# Patient Record
Sex: MALE | Race: WHITE | NOT HISPANIC OR LATINO | Employment: OTHER
[De-identification: names, ages, dates, MRNs, and addresses within clinical notes are randomized per-mention and may not be internally consistent; named-entity substitution may affect disease eponyms.]

---

## 2017-12-02 ENCOUNTER — HEALTH MAINTENANCE LETTER (OUTPATIENT)
Age: 62
End: 2017-12-02

## 2018-03-14 ENCOUNTER — HOSPITAL ENCOUNTER (EMERGENCY)
Facility: CLINIC | Age: 63
Discharge: HOME | End: 2018-03-14
Attending: EMERGENCY MEDICINE | Admitting: EMERGENCY MEDICINE
Payer: COMMERCIAL

## 2018-03-14 ENCOUNTER — APPOINTMENT (OUTPATIENT)
Dept: MRI IMAGING | Facility: CLINIC | Age: 63
End: 2018-03-14
Attending: EMERGENCY MEDICINE
Payer: COMMERCIAL

## 2018-03-14 VITALS
RESPIRATION RATE: 16 BRPM | OXYGEN SATURATION: 94 % | SYSTOLIC BLOOD PRESSURE: 175 MMHG | HEIGHT: 61 IN | WEIGHT: 260 LBS | TEMPERATURE: 98 F | HEART RATE: 89 BPM | BODY MASS INDEX: 49.09 KG/M2 | DIASTOLIC BLOOD PRESSURE: 88 MMHG

## 2018-03-14 DIAGNOSIS — I10 ESSENTIAL HYPERTENSION: Primary | ICD-10-CM

## 2018-03-14 DIAGNOSIS — R51.9 CHRONIC NONINTRACTABLE HEADACHE, UNSPECIFIED HEADACHE TYPE: ICD-10-CM

## 2018-03-14 DIAGNOSIS — G89.29 CHRONIC NONINTRACTABLE HEADACHE, UNSPECIFIED HEADACHE TYPE: ICD-10-CM

## 2018-03-14 LAB
ANION GAP SERPL CALCULATED.3IONS-SCNC: 5 MMOL/L (ref 3–14)
BASOPHILS # BLD AUTO: 0 10E9/L (ref 0–0.2)
BASOPHILS NFR BLD AUTO: 0.6 %
BUN SERPL-MCNC: 20 MG/DL (ref 7–30)
CALCIUM SERPL-MCNC: 8.5 MG/DL (ref 8.5–10.1)
CHLORIDE SERPL-SCNC: 110 MMOL/L (ref 94–109)
CO2 SERPL-SCNC: 28 MMOL/L (ref 20–32)
CREAT SERPL-MCNC: 1.03 MG/DL (ref 0.66–1.25)
DIFFERENTIAL METHOD BLD: NORMAL
EOSINOPHIL # BLD AUTO: 0.1 10E9/L (ref 0–0.7)
EOSINOPHIL NFR BLD AUTO: 1.5 %
ERYTHROCYTE [DISTWIDTH] IN BLOOD BY AUTOMATED COUNT: 13.5 % (ref 10–15)
GFR SERPL CREATININE-BSD FRML MDRD: 73 ML/MIN/1.7M2
GLUCOSE SERPL-MCNC: 146 MG/DL (ref 70–99)
HCT VFR BLD AUTO: 45.4 % (ref 40–53)
HGB BLD-MCNC: 15.7 G/DL (ref 13.3–17.7)
IMM GRANULOCYTES # BLD: 0 10E9/L (ref 0–0.4)
IMM GRANULOCYTES NFR BLD: 0.2 %
INTERPRETATION ECG - MUSE: NORMAL
LYMPHOCYTES # BLD AUTO: 1.8 10E9/L (ref 0.8–5.3)
LYMPHOCYTES NFR BLD AUTO: 28.9 %
MCH RBC QN AUTO: 29.9 PG (ref 26.5–33)
MCHC RBC AUTO-ENTMCNC: 34.6 G/DL (ref 31.5–36.5)
MCV RBC AUTO: 87 FL (ref 78–100)
MONOCYTES # BLD AUTO: 0.5 10E9/L (ref 0–1.3)
MONOCYTES NFR BLD AUTO: 7.3 %
NEUTROPHILS # BLD AUTO: 3.8 10E9/L (ref 1.6–8.3)
NEUTROPHILS NFR BLD AUTO: 61.5 %
NRBC # BLD AUTO: 0 10*3/UL
NRBC BLD AUTO-RTO: 0 /100
PLATELET # BLD AUTO: 172 10E9/L (ref 150–450)
POTASSIUM SERPL-SCNC: 4.2 MMOL/L (ref 3.4–5.3)
RBC # BLD AUTO: 5.25 10E12/L (ref 4.4–5.9)
SODIUM SERPL-SCNC: 143 MMOL/L (ref 133–144)
TROPONIN I SERPL-MCNC: <0.015 UG/L (ref 0–0.04)
TSH SERPL DL<=0.005 MIU/L-ACNC: 2.34 MU/L (ref 0.4–4)
WBC # BLD AUTO: 6.2 10E9/L (ref 4–11)

## 2018-03-14 PROCEDURE — 27210995 ZZH RX 272: Performed by: EMERGENCY MEDICINE

## 2018-03-14 PROCEDURE — 25000128 H RX IP 250 OP 636: Performed by: EMERGENCY MEDICINE

## 2018-03-14 PROCEDURE — A9585 GADOBUTROL INJECTION: HCPCS | Performed by: EMERGENCY MEDICINE

## 2018-03-14 PROCEDURE — 70553 MRI BRAIN STEM W/O & W/DYE: CPT

## 2018-03-14 PROCEDURE — 25000132 ZZH RX MED GY IP 250 OP 250 PS 637: Performed by: EMERGENCY MEDICINE

## 2018-03-14 PROCEDURE — 80048 BASIC METABOLIC PNL TOTAL CA: CPT | Performed by: EMERGENCY MEDICINE

## 2018-03-14 PROCEDURE — 93005 ELECTROCARDIOGRAM TRACING: CPT

## 2018-03-14 PROCEDURE — 84443 ASSAY THYROID STIM HORMONE: CPT | Performed by: EMERGENCY MEDICINE

## 2018-03-14 PROCEDURE — 99285 EMERGENCY DEPT VISIT HI MDM: CPT | Mod: 25

## 2018-03-14 PROCEDURE — 96361 HYDRATE IV INFUSION ADD-ON: CPT

## 2018-03-14 PROCEDURE — 96360 HYDRATION IV INFUSION INIT: CPT | Mod: 59

## 2018-03-14 PROCEDURE — 85025 COMPLETE CBC W/AUTO DIFF WBC: CPT | Performed by: EMERGENCY MEDICINE

## 2018-03-14 PROCEDURE — 84484 ASSAY OF TROPONIN QUANT: CPT | Performed by: EMERGENCY MEDICINE

## 2018-03-14 RX ORDER — PROCHLORPERAZINE MALEATE 10 MG
10 TABLET ORAL EVERY 8 HOURS PRN
Qty: 5 TABLET | Refills: 0 | Status: SHIPPED | OUTPATIENT
Start: 2018-03-14 | End: 2018-07-12

## 2018-03-14 RX ORDER — ACYCLOVIR 200 MG/1
60 CAPSULE ORAL ONCE
Status: COMPLETED | OUTPATIENT
Start: 2018-03-14 | End: 2018-03-14

## 2018-03-14 RX ORDER — GADOBUTROL 604.72 MG/ML
11 INJECTION INTRAVENOUS ONCE
Status: COMPLETED | OUTPATIENT
Start: 2018-03-14 | End: 2018-03-14

## 2018-03-14 RX ORDER — AMLODIPINE BESYLATE 5 MG/1
5 TABLET ORAL DAILY
Qty: 30 TABLET | Refills: 0 | Status: SHIPPED | OUTPATIENT
Start: 2018-03-14 | End: 2018-03-29

## 2018-03-14 RX ORDER — CLONIDINE HYDROCHLORIDE 0.1 MG/1
0.2 TABLET ORAL ONCE
Status: COMPLETED | OUTPATIENT
Start: 2018-03-14 | End: 2018-03-14

## 2018-03-14 RX ADMIN — SODIUM CHLORIDE 60 ML: 9 INJECTION, SOLUTION INTRAMUSCULAR; INTRAVENOUS; SUBCUTANEOUS at 09:43

## 2018-03-14 RX ADMIN — CLONIDINE HYDROCHLORIDE 0.2 MG: 0.1 TABLET ORAL at 08:02

## 2018-03-14 RX ADMIN — GADOBUTROL 11 ML: 604.72 INJECTION INTRAVENOUS at 09:43

## 2018-03-14 RX ADMIN — SODIUM CHLORIDE 1000 ML: 9 INJECTION, SOLUTION INTRAVENOUS at 07:57

## 2018-03-14 ASSESSMENT — ENCOUNTER SYMPTOMS
FEVER: 0
SHORTNESS OF BREATH: 0
HEADACHES: 1
NUMBNESS: 1

## 2018-03-14 NOTE — ED PROVIDER NOTES
"  History     Chief Complaint: headache      HPI   Brian Flynn is a 62 year old male who presents with a dull, intermittent back-sided headache present for about a week and a half. This has been in the context of increasing blood pressure. He notes associated neck ache and intermittent issues with balance which are new for him and present more commonly in the morning. In addition, he notes numbness in bilateral toes. He is not diabetic. He notes he does have a history of diagnosed hypertension and he does not take medications for this, although his \"blood pressure has been high for awhile\". He reports he has not seen a doctor recently. He also notes he had the flu a few months ago. Patient denies vision changes, chest pain, shortness of breath and fever. He has not fallen recently.     Cardiac Risk Factors   Sex: Male   Tobacco: Negative   Hypertension: Positive  Diabetes: Negative  Hyperlipidemia: Negative  Family History: Negative    Allergies:  Fluticasone      Medications:    The patient is not currently taking any prescribed medications.     Past Medical History:    Benign HTN  Blind right eye   Obesity   Dermatitis legs    Past Surgical History:    Circumcision     Family History:    HTN  Cerebrovascular disease  Cancer  Asthma    Social History:  Marital Status:   Presents to the ED alone.   Tobacco Use: Never  Alcohol Use: Yes  PCP: Kev Bustillo     Review of Systems   Constitutional: Negative for fever.   Eyes: Negative for visual disturbance.   Respiratory: Negative for shortness of breath.    Cardiovascular: Negative for chest pain.   Neurological: Positive for numbness and headaches.        Positive for ataxia.    All other systems reviewed and are negative.    Physical Exam   First Vitals:  BP: (!) 229/127  Pulse: 89  Temp: 98  F (36.7  C)  Resp: 16  Height: 155.4 cm (5' 1.2\")  Weight: 117.9 kg (260 lb)  SpO2: 99 %    Physical Exam  General: Alert, appears well-developed and " well-nourished. Cooperative.     In mild distress, diaphoretic.   HEENT:  Head:  Atraumatic  Ears:  External ears are normal  Mouth/Throat:  Oropharynx is without erythema or exudate and mucous membranes are moist.    Eyes:   Conjunctivae normal and EOM are normal. No scleral icterus.    Pupils are equal, round, and reactive to light.   Neck:   Normal range of motion. Neck supple.  CV:  Normal rate, regular rhythm, normal heart sounds and radial pulses are 2+ and symmetric.  No murmur.  Resp:  Breath sounds are clear bilaterally    Non-labored, no retractions or accessory muscle use  GI:  Obese. Abdomen is soft, no distension, no tenderness. No rebound or guarding.  No CVA tenderness bilaterally  MS:  Normal range of motion. No edema.    Normal strength in all 4 extremities.     Back atraumatic.    No midline cervical, thoracic, or lumbar tenderness  Skin:  Warm and dry.  No rash or lesions noted.  Neuro:  Alert. Normal strength.  Sensation intact in all 4 extremities. GCS: 15    Cranial nerves 2-12 intact.    Rapid alternating movement intact bilaterally.     Finger to nose coordination intact bilaterally.     Gait normal.       Negative Romberg   Psych:  Normal mood and affect.    Emergency Department Course   ECG:  @ 0802  Indication: Ataxia  Vent. Rate 83 bpm. WI interval 122 ms. QRS duration 96 ms. QT/QTc 380/446 ms. P-R-T axis 51 17 137.   Normal sinus rhythm. T wave abnormality, consider lateral ischemia. Abnormal ECG.    Read @ 0810 by Dr. Busby.    Imaging:  MR brain, with and without contrast, per radiology:   IMPRESSION:      1. Nothing acute.  2. Mild nonspecific chronic white matter disease.     Radiographic findings were communicated with the patient who voiced understanding of the findings.    Laboratory:  CBC:  WBC 6.2, HGB 15.7, , otherwise WNL  BMP: Chloride 110 (H), glucose 146 (H), otherwise WNL (Creatinine 1.03)  Troponin: <0.015  TSH with free T4 reflex: 2.34    Interventions:  0757:  Normal Saline, 1 liter, IV bolus   0802: Catapres, 0.2 mg, oral  0943: Gadavist, 11 mL, IV injection    Emergency Department Course:  Nursing notes and vitals reviewed.  0749: I performed an exam of the patient as documented above.  The above workup was undertaken.  1010: I rechecked the patient and discussed results.  Findings and plan explained to the Patient. Patient discharged home, status improved, with instructions regarding supportive care, medications, and reasons to return as well as the importance of close follow-up was reviewed. Patient was prescribed Norvasc and Compazine.     Impression & Plan      Medical Decision Making:  Patient is a 62-year-old male who presents with one week of persistent headache as well as concern for ataxia. Patient has elevated blood pressure concerning with a systolic reading greater than 220 on initial arrival to the emergency department. He has no associated symptoms such as chest pain, shortness of breath or vision changes. 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 Patient presented with concern for hypertensive emergency with his history of gait instability and ataxia.  Patient's gait does appear normal here and his neuro exam seems grossly normal.  He does have a persistent headache that he describes started in the back of his head near the connection between the base of the occiput and his cervical spine. Patient did have a MRI which shows no evidence of intracranial hemorrhage or posterior circulation stroke. His EKG does have some nonspecific T-wave inversions in the lateral leads. It is unclear if these are old or new but patient has a negative troponin here and no active chest pain symptoms.  Negative troponin today, lower concern for ACS.  There is no indication for inpatient admission at this time. Patient was given clonidine here for his elevated blood pressure. This helped to resolve his blood pressure in the  emergent setting. His repeat blood pressure check prior to discharge was in the 170s systolic.     Patient did not have full resolution of his headache here. He had declined headache medications in the emergency department and preferred rather to go home with oral medications. We did give him a couple tabs of oral Compazine for use at home. We also will start him on a low-dose of amlodipine at 5 mg. Plan is for him to follow up with primary care in the next one week for recheck and the potential of increasing his amlodipine to 10 mg in one week if his primary care provider feels this is necessary.  Patient has not seen a physician in over eight years and should have a recheck of fasting cholesterol as well as fasting glucose and other annual checkup labs.  No indication for inpatient admission at this time.  Patient has no other concerning signs of stroke at this time.  Patient certainly should return and encouraged to return to emergency department if he develops worsening symptoms, new-onset slurred speech or vision changes or other concerning findings for stroke, develops chest pain or shortness of breath, or has additional questions regarding his elevated blood pressure.  Sent home with education regarding hypertension treatment and headache treatment.  Close return precautions and discharge instructions understood prior to discharge.  Discharged home.    Diagnosis:    ICD-10-CM    1. Essential hypertension I10    2. Chronic nonintractable headache, unspecified headache type R51        Disposition:  Discharged to home.     Discharge Medications:  Discharge Medication List as of 3/14/2018 10:17 AM      START taking these medications    Details   amLODIPine (NORVASC) 5 MG tablet Take 1 tablet (5 mg) by mouth daily, Disp-30 tablet, R-0, Local Print      prochlorperazine (COMPAZINE) 10 MG tablet Take 1 tablet (10 mg) by mouth every 8 hours as needed for nausea or other (headache not responsive to tylenol alone),  Disp-5 tablet, R-0, Local Print               I, Jennifer Walker, am serving as a scribe on 3/14/2018 at 7:49 AM to personally document services performed by Dr. Busby based on my observations and the provider's statements to me.    EMERGENCY DEPARTMENT       Gerald Busby MD  03/14/18 [id number]

## 2018-03-14 NOTE — ED AVS SNAPSHOT
Emergency Department    [address] [place] [id number]    Phone:  [phone]    Fax:  [phone]                                       Brian Flynn   MRN: [id number]    Department:   Emergency Department   Date of Visit:  3/14/2018           After Visit Summary Signature Page     I have received my discharge instructions, and my questions have been answered. I have discussed any challenges I see with this plan with the nurse or doctor.    ..........................................................................................................................................  Patient/Patient Representative Signature      ..........................................................................................................................................  Patient Representative Print Name and Relationship to Patient    ..................................................               ................................................  Date                                            Time    ..........................................................................................................................................  Reviewed by Signature/Title    ...................................................              ..............................................  Date                                                            Time

## 2018-03-14 NOTE — DISCHARGE INSTRUCTIONS
Discharge Instructions  Hypertension - High Blood Pressure    During you visit to the Emergency Department, your blood pressure was higher than the recommended blood pressure.  This may be related to stress, pain, medication or other temporary conditions. In these cases, your blood pressure may return to normal on its own. If you have a history of high blood pressure, you may need to have your provider adjust your medications. Sometimes, your high measurement here may indicate that you have developed high blood pressure that will stay high unless it is treated. As a general rule, high blood pressure causes problems over years rather than days, weeks, or months. So, while it is important to treat blood pressure, it is rarely important to treat blood pressure immediately. Occasionally we will begin a medication in the Emergency Department; more often we will recommend close follow-up for medications with a primary doctor/clinic.    Generally, every Emergency Department visit should have a follow-up clinic visit with either a primary or a specialty clinic/provider. Please follow-up as instructed by your emergency provider today.    Return to the Emergency Department if you start to have:    A severe headache.    Chest pain.    Shortness of breath.    Weakness or numbness that affects one part of the body.    Confusion.    Vision changes.    Significant swelling of legs and/or eyes.    A reaction to any medication started in the Emergency Department.    What can I do to help myself?    Avoid alcohol.    Take any blood pressure medicine that you are prescribed.    Get a good night s sleep.    Lower your salt intake.    Exercise.    Lose weight.    Manage stress.    See your doctor regularly    If blood pressure medication was started in the Emergency Department:    The medicine may not have an immediate effect. The body and brain determine what blood pressure you have. The medicine s job is to retrain the body s   thermostat  to a lower blood pressure.    You will need to follow up with your provider to see how this medicine is working for you.  If you were given a prescription for medicine here today, be sure to read all of the information (including the package insert) that comes with your prescription.  This will include important information about the medicine, its side effects, and any warnings that you need to know about.  The pharmacist who fills the prescription can provide more information and answer questions you may have about the medicine.  If you have questions or concerns that the pharmacist cannot address, please call or return to the Emergency Department.   Remember that you can always come back to the Emergency Department if you are not able to see your regular provider in the amount of time listed above, if you get any new symptoms, or if there is anything that worries you.    Discharge Instructions  Headache    You were seen today for a headache. Headaches may be caused by many different things such as muscle tension, sinus inflammation, anxiety and stress, having too little sleep, too much alcohol, some medical conditions or injury. You may have a migraine, which is caused by changes in the blood vessels in your head.  At this time your provider does not find that your headache is a sign of anything dangerous or life-threatening.  However, sometimes the signs of serious illness do not show up right away.      Generally, every Emergency Department visit should have a follow-up clinic visit with either a primary or a specialty clinic/provider. Please follow-up as instructed by your emergency provider today.    Return to the Emergency Department if:    You get a new fever of 100.4 F or higher.    Your headache gets much worse.    You get a stiff neck with your headache.    You get a new headache that is significantly different or worse than headaches you have had before.    You are vomiting (throwing up) and  cannot keep food or water down.    You have blurry or double vision or other problems with your eyes.    You have a new weakness on one side of your body.    You have difficulty with balance which is new.    You or your family thinks you are confused.    You have a seizure.    What can I do to help myself?    Pain medications - You may take a pain medication such as Tylenol  (acetaminophen), Advil , Motrin  (ibuprofen) or Aleve  (naproxen).    Take a pain reliever as soon as you notice symptoms.  Starting medications as soon as you start to have symptoms may lessen the amount of pain you have.    Relaxing in a quiet, dark room may help.    Get enough sleep and eat meals regularly.    You may need to watch for certain foods or other things which may trigger your headaches.  Keeping a journal of your headaches and possible triggers may help you and your primary provider to identify things which you should avoid which may be causing your headaches.  If you were given a prescription for medicine here today, be sure to read all of the information (including the package insert) that comes with your prescription.  This will include important information about the medicine, its side effects, and any warnings that you need to know about.  The pharmacist who fills the prescription can provide more information and answer questions you may have about the medicine.  If you have questions or concerns that the pharmacist cannot address, please call or return to the Emergency Department.   Remember that you can always come back to the Emergency Department if you are not able to see your regular provider in the amount of time listed above, if you get any new symptoms, or if there is anything that worries you.

## 2018-03-14 NOTE — ED AVS SNAPSHOT
Emergency Department    [address] [facility] [address]    Phone:  [phone]    Fax:  [phone]                                       Brian Flynn   MRN: [id number]    Department:   Emergency Department   Date of Visit:  3/14/2018           Patient Information     Date Of Birth          1955        Your diagnoses for this visit were:     Essential hypertension     Chronic nonintractable headache, unspecified headache type        You were seen by Gerald Busby MD.      Follow-up Information     Schedule an appointment as soon as possible for a visit with Kev Bustillo MD.    Specialty:  Family Practice    Contact information:    [address]  [facility] [address] [phone]          Follow up with  Emergency Department.    Specialty:  EMERGENCY MEDICINE    Why:  If symptoms worsen    Contact information:    [address] [facility] [id number] [phone]        Discharge Instructions       Discharge Instructions  Hypertension - High Blood Pressure    During you visit to the Emergency Department, your blood pressure was higher than the recommended blood pressure.  This may be related to stress, pain, medication or other temporary conditions. In these cases, your blood pressure may return to normal on its own. If you have a history of high blood pressure, you may need to have your provider adjust your medications. Sometimes, your high measurement here may indicate that you have developed high blood pressure that will stay high unless it is treated. As a general rule, high blood pressure causes problems over years rather than days, weeks, or months. So, while it is important to treat blood pressure, it is rarely important to treat blood pressure immediately. Occasionally we will begin a medication in the Emergency Department; more often we will recommend close follow-up for medications with a primary  doctor/clinic.    Generally, every Emergency Department visit should have a follow-up clinic visit with either a primary or a specialty clinic/provider. Please follow-up as instructed by your emergency provider today.    Return to the Emergency Department if you start to have:    A severe headache.    Chest pain.    Shortness of breath.    Weakness or numbness that affects one part of the body.    Confusion.    Vision changes.    Significant swelling of legs and/or eyes.    A reaction to any medication started in the Emergency Department.    What can I do to help myself?    Avoid alcohol.    Take any blood pressure medicine that you are prescribed.    Get a good night s sleep.    Lower your salt intake.    Exercise.    Lose weight.    Manage stress.    See your doctor regularly    If blood pressure medication was started in the Emergency Department:    The medicine may not have an immediate effect. The body and brain determine what blood pressure you have. The medicine s job is to retrain the body s  thermostat  to a lower blood pressure.    You will need to follow up with your provider to see how this medicine is working for you.  If you were given a prescription for medicine here today, be sure to read all of the information (including the package insert) that comes with your prescription.  This will include important information about the medicine, its side effects, and any warnings that you need to know about.  The pharmacist who fills the prescription can provide more information and answer questions you may have about the medicine.  If you have questions or concerns that the pharmacist cannot address, please call or return to the Emergency Department.   Remember that you can always come back to the Emergency Department if you are not able to see your regular provider in the amount of time listed above, if you get any new symptoms, or if there is anything that worries you.    Discharge  Instructions  Headache    You were seen today for a headache. Headaches may be caused by many different things such as muscle tension, sinus inflammation, anxiety and stress, having too little sleep, too much alcohol, some medical conditions or injury. You may have a migraine, which is caused by changes in the blood vessels in your head.  At this time your provider does not find that your headache is a sign of anything dangerous or life-threatening.  However, sometimes the signs of serious illness do not show up right away.      Generally, every Emergency Department visit should have a follow-up clinic visit with either a primary or a specialty clinic/provider. Please follow-up as instructed by your emergency provider today.    Return to the Emergency Department if:    You get a new fever of 100.4 F or higher.    Your headache gets much worse.    You get a stiff neck with your headache.    You get a new headache that is significantly different or worse than headaches you have had before.    You are vomiting (throwing up) and cannot keep food or water down.    You have blurry or double vision or other problems with your eyes.    You have a new weakness on one side of your body.    You have difficulty with balance which is new.    You or your family thinks you are confused.    You have a seizure.    What can I do to help myself?    Pain medications - You may take a pain medication such as Tylenol  (acetaminophen), Advil , Motrin  (ibuprofen) or Aleve  (naproxen).    Take a pain reliever as soon as you notice symptoms.  Starting medications as soon as you start to have symptoms may lessen the amount of pain you have.    Relaxing in a quiet, dark room may help.    Get enough sleep and eat meals regularly.    You may need to watch for certain foods or other things which may trigger your headaches.  Keeping a journal of your headaches and possible triggers may help you and your primary provider to identify things which  you should avoid which may be causing your headaches.  If you were given a prescription for medicine here today, be sure to read all of the information (including the package insert) that comes with your prescription.  This will include important information about the medicine, its side effects, and any warnings that you need to know about.  The pharmacist who fills the prescription can provide more information and answer questions you may have about the medicine.  If you have questions or concerns that the pharmacist cannot address, please call or return to the Emergency Department.   Remember that you can always come back to the Emergency Department if you are not able to see your regular provider in the amount of time listed above, if you get any new symptoms, or if there is anything that worries you.      24 Hour Appointment Hotline       To make an appointment at any [facility], call [phone] ([phone]). If you don't have a family doctor or clinic, we will help you find one. [facility] clinics are conveniently located to serve the needs of you and your family.             Review of your medicines      START taking        Dose / Directions Last dose taken    amLODIPine 5 MG tablet   Commonly known as:  NORVASC   Dose:  5 mg   Quantity:  30 tablet        Take 1 tablet (5 mg) by mouth daily   Refills:  0        prochlorperazine 10 MG tablet   Commonly known as:  COMPAZINE   Dose:  10 mg   Quantity:  5 tablet        Take 1 tablet (10 mg) by mouth every 8 hours as needed for nausea or other (headache not responsive to tylenol alone)   Refills:  0                Prescriptions were sent or printed at these locations (2 Prescriptions)                   Other Prescriptions                Printed at Department/Unit printer (2 of 2)         amLODIPine (NORVASC) 5 MG tablet               prochlorperazine (COMPAZINE) 10 MG tablet                Procedures and tests performed during your visit     Basic  metabolic panel    CBC with platelets differential    EKG 12-lead, tracing only    MR Brain w/o & w Contrast    TSH with free T4 reflex    Troponin I      Orders Needing Specimen Collection     None      Pending Results     Date and Time Order Name Status Description    3/14/2018 0754 EKG 12-lead, tracing only Preliminary             Pending Culture Results     No orders found from 3/12/2018 to 3/15/2018.            Pending Results Instructions     If you had any lab results that were not finalized at the time of your Discharge, you can call the ED Lab Result RN at [phone]. You will be contacted by this team for any positive Lab results or changes in treatment. The nurses are available 7 days a week from 10A to 6:30P.  You can leave a message 24 hours per day and they will return your call.        Test Results From Your Hospital Stay        3/14/2018  8:02 AM      Component Results     Component Value Ref Range & Units Status    WBC 6.2 4.0 - 11.0 10e9/L Final    RBC Count 5.25 4.4 - 5.9 10e12/L Final    Hemoglobin 15.7 13.3 - 17.7 g/dL Final    Hematocrit 45.4 40.0 - 53.0 % Final    MCV 87 78 - 100 fl Final    MCH 29.9 26.5 - 33.0 pg Final    MCHC 34.6 31.5 - 36.5 g/dL Final    RDW 13.5 10.0 - 15.0 % Final    Platelet Count 172 150 - 450 10e9/L Final    Diff Method Automated Method  Final    % Neutrophils 61.5 % Final    % Lymphocytes 28.9 % Final    % Monocytes 7.3 % Final    % Eosinophils 1.5 % Final    % Basophils 0.6 % Final    % Immature Granulocytes 0.2 % Final    Nucleated RBCs 0 0 /100 Final    Absolute Neutrophil 3.8 1.6 - 8.3 10e9/L Final    Absolute Lymphocytes 1.8 0.8 - 5.3 10e9/L Final    Absolute Monocytes 0.5 0.0 - 1.3 10e9/L Final    Absolute Eosinophils 0.1 0.0 - 0.7 10e9/L Final    Absolute Basophils 0.0 0.0 - 0.2 10e9/L Final    Abs Immature Granulocytes 0.0 0 - 0.4 10e9/L Final    Absolute Nucleated RBC 0.0  Final         3/14/2018  8:24 AM      Component Results     Component Value Ref  Range & Units Status    Troponin I ES <0.015 0.000 - 0.045 ug/L Final    The 99th percentile for upper reference range is 0.045 ug/L.  Troponin values   in the range of 0.045 - 0.120 ug/L may be associated with risks of adverse   clinical events.           3/14/2018  8:19 AM      Component Results     Component Value Ref Range & Units Status    Sodium 143 133 - 144 mmol/L Final    Potassium 4.2 3.4 - 5.3 mmol/L Final    Chloride 110 (H) 94 - 109 mmol/L Final    Carbon Dioxide 28 20 - 32 mmol/L Final    Anion Gap 5 3 - 14 mmol/L Final    Glucose 146 (H) 70 - 99 mg/dL Final    Urea Nitrogen 20 7 - 30 mg/dL Final    Creatinine 1.03 0.66 - 1.25 mg/dL Final    GFR Estimate 73 >60 mL/min/1.7m2 Final    Non  GFR Calc    GFR Estimate If Black 88 >60 mL/min/1.7m2 Final    African American GFR Calc    Calcium 8.5 8.5 - 10.1 mg/dL Final         3/14/2018  8:29 AM      Component Results     Component Value Ref Range & Units Status    TSH 2.34 0.40 - 4.00 mU/L Final         3/14/2018  9:51 AM      Narrative     MRI BRAIN WITHOUT AND WITH CONTRAST  3/14/2018 9:43 AM    HISTORY:  Headache, HTN urgency and ataxia.      TECHNIQUE:  Multiplanar, multisequence MRI of the brain without and  with 11 mL Gadavist.    COMPARISON: None.    FINDINGS:  There are a few scattered foci of T2 hyperintensity in the  white matter of each cerebral hemisphere consistent with chronic small  vessel ischemic disease.  There is no evidence of hemorrhage, mass,  acute infarct, or anomaly.  There are no gadolinium enhancing lesions.       The facial structures appear normal. The arteries at the base of the  brain and the dural venous sinuses appear patent.         Impression     IMPRESSION:      1. Nothing acute.  2. Mild nonspecific chronic white matter disease.     GREGOR HUTCHINS MD                Clinical Quality Measure: Blood Pressure Screening     Your blood pressure was checked while you were in the emergency department today. The  "last reading we obtained was  BP: 175/88 . Please read the guidelines below about what these numbers mean and what you should do about them.  If your systolic blood pressure (the top number) is less than 120 and your diastolic blood pressure (the bottom number) is less than 80, then your blood pressure is normal. There is nothing more that you need to do about it.  If your systolic blood pressure (the top number) is 120-139 or your diastolic blood pressure (the bottom number) is 80-89, your blood pressure may be higher than it should be. You should have your blood pressure rechecked within a year by a primary care provider.  If your systolic blood pressure (the top number) is 140 or greater or your diastolic blood pressure (the bottom number) is 90 or greater, you may have high blood pressure. High blood pressure is treatable, but if left untreated over time it can put you at risk for heart attack, stroke, or kidney failure. You should have your blood pressure rechecked by a primary care provider within the next 4 weeks.  If your provider in the emergency department today gave you specific instructions to follow-up with your doctor or provider even sooner than that, you should follow that instruction and not wait for up to 4 weeks for your follow-up visit.        Thank you for choosing [facility]       Thank you for choosing [facility] for your care. Our goal is always to provide you with excellent care. Hearing back from our patients is one way we can continue to improve our services. Please take a few minutes to complete the written survey that you may receive in the mail after you visit with us. Thank you!        [employer] Information     [employer] lets you send messages to your doctor, view your test results, renew your prescriptions, schedule appointments and more. To sign up, go to [email or web address] . Click on \"Log in\" on the left side of the screen, which will take you to the Welcome page. Then click on \"Sign " "up Now\" on the right side of the page.     You will be asked to enter the access code listed below, as well as some personal information. Please follow the directions to create your username and password.     Your access code is: [email or web address]  Expires: 2018 10:17 AM     Your access code will  in 90 days. If you need help or a new code, please call your [place] clinic or [phone].        Care EveryWhere ID     This is your Care EveryWhere ID. This could be used by other organizations to access your [place] medical records  [id number]        Equal Access to Services     [facility] : Hadpolly Garrett, chester monroe, david navas, amie stubbs . So [facility] [phone].    ATENCIÓN: Si habla español, tiene a jauregui disposición servicios gratuitos de asistencia lingüística. Llame al [phone].    We comply with applicable federal civil rights laws and [place] laws. We do not discriminate on the basis of race, color, national origin, age, disability, sex, sexual orientation, or gender identity.            After Visit Summary       This is your record. Keep this with you and show to your community pharmacist(s) and doctor(s) at your next visit.                  "

## 2018-03-21 ENCOUNTER — OFFICE VISIT (OUTPATIENT)
Dept: FAMILY MEDICINE | Facility: CLINIC | Age: 63
End: 2018-03-21
Payer: COMMERCIAL

## 2018-03-21 VITALS
DIASTOLIC BLOOD PRESSURE: 96 MMHG | SYSTOLIC BLOOD PRESSURE: 184 MMHG | WEIGHT: 273 LBS | HEART RATE: 86 BPM | TEMPERATURE: 98.2 F | OXYGEN SATURATION: 96 % | BODY MASS INDEX: 51.25 KG/M2

## 2018-03-21 DIAGNOSIS — I16.0 ASYMPTOMATIC HYPERTENSIVE URGENCY: ICD-10-CM

## 2018-03-21 DIAGNOSIS — I10 ESSENTIAL HYPERTENSION: Primary | ICD-10-CM

## 2018-03-21 DIAGNOSIS — Z13.6 CARDIOVASCULAR SCREENING; LDL GOAL LESS THAN 130: ICD-10-CM

## 2018-03-21 DIAGNOSIS — E66.01 MORBID OBESITY (H): ICD-10-CM

## 2018-03-21 LAB
CREAT UR-MCNC: 93 MG/DL
MICROALBUMIN UR-MCNC: 11 MG/L
MICROALBUMIN/CREAT UR: 11.79 MG/G CR (ref 0–17)

## 2018-03-21 PROCEDURE — 99214 OFFICE O/P EST MOD 30 MIN: CPT | Performed by: FAMILY MEDICINE

## 2018-03-21 PROCEDURE — 82043 UR ALBUMIN QUANTITATIVE: CPT | Performed by: FAMILY MEDICINE

## 2018-03-21 RX ORDER — HYDROCHLOROTHIAZIDE 25 MG/1
25 TABLET ORAL DAILY
Qty: 30 TABLET | Refills: 1 | Status: SHIPPED | OUTPATIENT
Start: 2018-03-21 | End: 2018-04-12

## 2018-03-21 NOTE — PROGRESS NOTES
SUBJECTIVE:   Brian Flynn is a 62 year old male who presents to clinic today for the following health issues:      ED/UC Followup:    Facility:  [facility]  Date of visit: 3/14/18  Reason for visit: headache - elevated BP   Current Status: bps still slightly elevated - still c/o headache and sore neck     Patient came today for follow-up on his blood pressure.  He was in the ER almost 1 week ago and his blood pressure was very elevated.  He was evaluated thoroughly including MRI of the neck EKG and blood tests which were essentially noncontributory.  Currently he denies any chest pain shortness of breath no weakness in arms or legs.  He does complain of some neck discomfort which is on and off.  He has checked his blood pressure at home on the wrist monitor which shows 150s-160s systolic over 90s diastolic readings.  Patient does have some degree of anxiety coming to a doctor and he is contributing that to his blood pressure.  He denies any other symptoms at this time no urinary symptoms, no back pain.  He was started on  Amlodipine 5 mg.  He denies any side effects of amlodipine.  Overall he is feeling improved other than his blood pressure is still elevated and complains of some neck pain on and off.        Problem list and histories reviewed & adjusted, as indicated.  Additional history: as documented    Patient Active Problem List   Diagnosis     Obesity     Essential hypertension, benign     Redundant prepuce and phimosis     DERMATITIS LEGS     Borderline high cholesterol     CARDIOVASCULAR SCREENING; LDL GOAL LESS THAN 130     Past Surgical History:   Procedure Laterality Date     CIRCUMCISION,CLAMP  11/07    for Severe phimosis with balanoposthitis       Social History   Substance Use Topics     Smoking status: Never Smoker     Smokeless tobacco: Never Used     Alcohol use No     Family History   Problem Relation Age of Onset     DIABETES No family hx of      Hypertension Father      CEREBROVASCULAR  DISEASE Father      age 69     Breast Cancer No family hx of      Cancer - colorectal No family hx of      Prostate Cancer No family hx of      CANCER Mother      ovarian     Asthma Father          Current Outpatient Prescriptions   Medication Sig Dispense Refill     hydrochlorothiazide (HYDRODIURIL) 25 MG tablet Take 1 tablet (25 mg) by mouth daily 30 tablet 1     amLODIPine (NORVASC) 5 MG tablet Take 1 tablet (5 mg) by mouth daily 30 tablet 0     prochlorperazine (COMPAZINE) 10 MG tablet Take 1 tablet (10 mg) by mouth every 8 hours as needed for nausea or other (headache not responsive to tylenol alone) 5 tablet 0       Reviewed and updated as needed this visit by clinical staff  Tobacco  Allergies  Meds  Soc Hx      Reviewed and updated as needed this visit by Provider         ROS:  CONSTITUTIONAL: NEGATIVE for fever, chills, change in weight  ENT/MOUTH: NEGATIVE for ear, mouth and throat problems  RESP: NEGATIVE for significant cough or SOB  CV: NEGATIVE for chest pain, palpitations or peripheral edema    OBJECTIVE:                                                    BP (!) 184/96  Pulse 86  Temp 98.2  F (36.8  C) (Tympanic)  Wt 273 lb (123.8 kg)  SpO2 96%  BMI 51.25 kg/m2  Body mass index is 51.25 kg/(m^2).   GENERAL: healthy, alert, well nourished, well hydrated, no distress  HENT: ear canals- normal; TMs- normal; Nose- normal; Mouth- no ulcers, no lesions  NECK, no adenopathy, no asymmetry, no masses, no stiffness; thyroid- normal to palpation, neck muscle tenderness.  RESP: lungs clear to auscultation - no rales, no rhonchi, no wheezes  CV: regular rates and rhythm, normal S1 S2, no S3 or S4 and no murmur, no click or rub -  ABDOMEN: soft, no tenderness, no  hepatosplenomegaly, no masses, normal bowel sounds         ASSESSMENT/PLAN:                                                        ICD-10-CM    1. Essential hypertension I10 hydrochlorothiazide (HYDRODIURIL) 25 MG tablet     Albumin Random Urine  Quantitative with Creat Ratio   2. Morbid obesity (H) E66.01    3. CARDIOVASCULAR SCREENING; LDL GOAL LESS THAN 130 Z13.6    4. Asymptomatic hypertensive urgency I16.0 hydrochlorothiazide (HYDRODIURIL) 25 MG tablet     Albumin Random Urine Quantitative with Creat Ratio     Patient came today for follow-up on his elevated blood pressure.  He was seen in the ER 1 week ago with extensive workup done including MRI EKG and blood test.  He was started on amlodipine.  He is taking that medication his blood pressure at home has been in the 160s systolic range.  Overall he feels better stable but he continued to have some neck discomfort on and off.  He denies any chest pain, shortness of breath.  Patient has hypertensive urgency but without any symptoms at this time.  I suggested we should add hydrochlorothiazide to already taking amlodipine.  He is advised to check his blood pressure once or twice a day for the next 1 week.  If the numbers are stable he will follow-up with me in 1 week otherwise if any change in symptoms or any high blood pressure he needs to be seen urgently in the ER setting.  We did discuss about lifestyle changes including low-salt diet and doing some exercise.    I also discussed with the patient regarding sleep apnea given his body habitus.  Patient does snore at night.  Once we optimize the dosing of his blood pressure, if  continue to be elevated we will try to get him a sleep study to see if there is any sleep apnea involved.    Warning signs were discussed with the patient in detail.  He understands that and follow-up immediately if there is any change in symptoms.  She will start taking hydrochlorothiazide along with amlodipine and follow-up in 1 week or sooner if there is any change in symptoms.      Kirill Beal MD  [facility]

## 2018-03-21 NOTE — NURSING NOTE
"Chief Complaint   Patient presents with     ER F/U       Initial BP (!) 210/100  Pulse 86  Temp 98.2  F (36.8  C) (Tympanic)  Wt 273 lb (123.8 kg)  SpO2 96%  BMI 51.25 kg/m2 Estimated body mass index is 51.25 kg/(m^2) as calculated from the following:    Height as of 3/14/18: 5' 1.2\" (1.554 m).    Weight as of this encounter: 273 lb (123.8 kg).  Medication Reconciliation: complete    Current Outpatient Prescriptions   Medication Sig Dispense Refill     amLODIPine (NORVASC) 5 MG tablet Take 1 tablet (5 mg) by mouth daily 30 tablet 0     prochlorperazine (COMPAZINE) 10 MG tablet Take 1 tablet (10 mg) by mouth every 8 hours as needed for nausea or other (headache not responsive to tylenol alone) 5 tablet 0       Marquis M, JOSE C  "

## 2018-03-21 NOTE — MR AVS SNAPSHOT
After Visit Summary   3/21/2018    Brian Flynn    MRN: [id number]           Patient Information     Date Of Birth          1955        Visit Information        Provider Department      3/21/2018 8:20 AM Kirill Beal MD [facility]        Today's Diagnoses     Essential hypertension    -  1    Morbid obesity (H)        CARDIOVASCULAR SCREENING; LDL GOAL LESS THAN 130        Asymptomatic hypertensive urgency           Follow-ups after your visit        Follow-up notes from your care team     Return in about 1 week (around 3/28/2018) for HTN check.      Your next 10 appointments already scheduled     Mar 29, 2018  7:40 AM CDT   Office Visit with Kirill Beal MD   [facility] [place] ([facility])    [address] [place] [id number] [phone]           Bring a current list of meds and any records pertaining to this visit. For Physicals, please bring immunization records and any forms needing to be filled out. Please arrive 10 minutes early to complete paperwork.              Who to contact     If you have questions or need follow up information about today's clinic visit or your schedule please contact [facility] directly at [phone].  Normal or non-critical lab and imaging results will be communicated to you by MyChart, letter or phone within 4 business days after the clinic has received the results. If you do not hear from us within 7 days, please contact the clinic through [facility] or phone. If you have a critical or abnormal lab result, we will notify you by phone as soon as possible.  Submit refill requests through [employer] or call your pharmacy and they will forward the refill request to us. Please allow 3 business days for your refill to be completed.          Additional Information About Your Visit        [facility] Information     [employer] lets you send messages to your doctor, view your test results,  "renew your prescriptions, schedule appointments and more. To sign up, go to [email or web address] . Click on \"Log in\" on the left side of the screen, which will take you to the Welcome page. Then click on \"Sign up Now\" on the right side of the page.     You will be asked to enter the access code listed below, as well as some personal information. Please follow the directions to create your username and password.     Your access code is: [email or web address]  Expires: 2018 10:17 AM     Your access code will  in 90 days. If you need help or a new code, please call your [place] clinic or [phone].        Care EveryWhere ID     This is your Care EveryWhere ID. This could be used by other organizations to access your [place] medical records  [id number]        Your Vitals Were     Pulse Temperature Pulse Oximetry BMI (Body Mass Index)          86 98.2  F (36.8  C) (Tympanic) 96% 51.25 kg/m2         Blood Pressure from Last 3 Encounters:   18 (!) 184/96   18 175/88   11 164/84    Weight from Last 3 Encounters:   18 273 lb (123.8 kg)   18 260 lb (117.9 kg)   11 257 lb 9.6 oz (116.8 kg)              We Performed the Following     Albumin Random Urine Quantitative with Creat Ratio          Today's Medication Changes          These changes are accurate as of 3/21/18  9:03 AM.  If you have any questions, ask your nurse or doctor.               Start taking these medicines.        Dose/Directions    hydrochlorothiazide 25 MG tablet   Commonly known as:  HYDRODIURIL   Used for:  Essential hypertension, Asymptomatic hypertensive urgency   Started by:  Kirill Beal MD        Dose:  25 mg   Take 1 tablet (25 mg) by mouth daily   Quantity:  30 tablet   Refills:  1            Where to get your medicines      [facility] - [place], [place] - [address] AT [facility] [address] [place]  [address], [facility] " [address]     Phone:  [phone]     hydrochlorothiazide 25 MG tablet                Primary Care Provider Office Phone # Fax #    Kev Bustillo [phone]       [address]  [facility] [address]        Equal Access to Services     [facility] : Hadii aad ku hadasho Soomaali, waaxda luqadaha, qaybta kaalmada adeegyada, waxay angelin hayjosepn adedelfin magda labella momin. So [facility] [phone].    ATENCIÓN: Si habla español, tiene a jauregui disposición servicios gratuitos de asistencia lingüística. [facility] [phone].    We comply with applicable federal civil rights laws and [place] laws. We do not discriminate on the basis of race, color, national origin, age, disability, sex, sexual orientation, or gender identity.            Thank you!     Thank you for choosing [facility]  for your care. Our goal is always to provide you with excellent care. Hearing back from our patients is one way we can continue to improve our services. Please take a few minutes to complete the written survey that you may receive in the mail after your visit with us. Thank you!             Your Updated Medication List - Protect others around you: Learn how to safely use, store and throw away your medicines at www.disposemymeds.org.          This list is accurate as of 3/21/18  9:03 AM.  Always use your most recent med list.                   Brand Name Dispense Instructions for use Diagnosis    amLODIPine 5 MG tablet    NORVASC    30 tablet    Take 1 tablet (5 mg) by mouth daily        hydrochlorothiazide 25 MG tablet    HYDRODIURIL    30 tablet    Take 1 tablet (25 mg) by mouth daily    Essential hypertension, Asymptomatic hypertensive urgency       prochlorperazine 10 MG tablet    COMPAZINE    5 tablet    Take 1 tablet (10 mg) by mouth every 8 hours as needed for nausea or other (headache not responsive to tylenol alone)

## 2018-03-21 NOTE — LETTER
March 22, 2018      Brian Flynn  [address]  [facility] [address]        Dear ,      I have reviewed your recent labs. Here are the results:    -Microalbumin (urine protein) test is normal.  ADVISE: recheck annually    Resulted Orders   Albumin Random Urine Quantitative with Creat Ratio   Result Value Ref Range    Creatinine Urine 93 mg/dL    Albumin Urine mg/L 11 mg/L    Albumin Urine mg/g Cr 11.79 0 - 17 mg/g Cr       If you have any questions or concerns, please call the clinic at the number listed above.       Sincerely,  Kirill Beal MD

## 2018-03-29 ENCOUNTER — OFFICE VISIT (OUTPATIENT)
Dept: FAMILY MEDICINE | Facility: CLINIC | Age: 63
End: 2018-03-29
Payer: COMMERCIAL

## 2018-03-29 VITALS
HEART RATE: 80 BPM | TEMPERATURE: 98 F | DIASTOLIC BLOOD PRESSURE: 92 MMHG | WEIGHT: 271 LBS | OXYGEN SATURATION: 96 % | BODY MASS INDEX: 50.87 KG/M2 | SYSTOLIC BLOOD PRESSURE: 176 MMHG

## 2018-03-29 DIAGNOSIS — I10 ESSENTIAL HYPERTENSION, BENIGN: Primary | ICD-10-CM

## 2018-03-29 PROCEDURE — 99214 OFFICE O/P EST MOD 30 MIN: CPT | Performed by: FAMILY MEDICINE

## 2018-03-29 RX ORDER — AMLODIPINE BESYLATE 5 MG/1
5 TABLET ORAL DAILY
Qty: 30 TABLET | Refills: 3 | Status: SHIPPED | OUTPATIENT
Start: 2018-03-29 | End: 2018-04-12

## 2018-03-29 RX ORDER — LISINOPRIL 20 MG/1
20 TABLET ORAL DAILY
Qty: 30 TABLET | Refills: 1 | Status: SHIPPED | OUTPATIENT
Start: 2018-03-29 | End: 2018-04-12

## 2018-03-29 NOTE — NURSING NOTE
"Chief Complaint   Patient presents with     Hypertension       Initial /90 (BP Location: Left arm, Patient Position: Chair, Cuff Size: Adult Large)  Pulse 80  Temp 98  F (36.7  C) (Tympanic)  Wt 271 lb (122.9 kg)  SpO2 96%  BMI 50.87 kg/m2 Estimated body mass index is 50.87 kg/(m^2) as calculated from the following:    Height as of 3/14/18: 5' 1.2\" (1.554 m).    Weight as of this encounter: 271 lb (122.9 kg).  Medication Reconciliation: complete  "

## 2018-03-29 NOTE — MR AVS SNAPSHOT
"              After Visit Summary   3/29/2018    Brian Flynn    MRN: [id number]           Patient Information     Date Of Birth          1955        Visit Information        Provider Department      3/29/2018 7:40 AM Kirill Beal MD [facility]        Today's Diagnoses     Essential hypertension, benign    -  1       Follow-ups after your visit        Future tests that were ordered for you today     Open Future Orders        Priority Expected Expires Ordered    Echocardiogram Complete Routine  3/29/2019 3/29/2018            Who to contact     If you have questions or need follow up information about today's clinic visit or your schedule please contact [facility] directly at [phone].  Normal or non-critical lab and imaging results will be communicated to you by MyChart, letter or phone within 4 business days after the clinic has received the results. If you do not hear from us within 7 days, please contact the clinic through [facility] or phone. If you have a critical or abnormal lab result, we will notify you by phone as soon as possible.  Submit refill requests through [employer] or call your pharmacy and they will forward the refill request to us. Please allow 3 business days for your refill to be completed.          Additional Information About Your Visit        MyChart Information     [facility] [facility] lets you send messages to your doctor, view your test results, renew your prescriptions, schedule appointments and more. To sign up, go to [email or web address] . Click on \"Log in\" on the left side of the screen, which will take you to the Welcome page. Then click on \"Sign up Now\" on the right side of the page.     You will be asked to enter the access code listed below, as well as some personal information. Please follow the directions to create your username and password.     Your access code is: [email or web address]  Expires: 2018 10:17 AM     Your access code will  in " 90 days. If you need help or a new code, please call your [place] clinic or [phone].        Care EveryWhere ID     This is your Care EveryWhere ID. This could be used by other organizations to access your [place] medical records  [id number]        Your Vitals Were     Pulse Temperature Pulse Oximetry BMI (Body Mass Index)          80 98  F (36.7  C) (Tympanic) 96% 50.87 kg/m2         Blood Pressure from Last 3 Encounters:   03/29/18 182/90   03/21/18 (!) 184/96   03/14/18 175/88    Weight from Last 3 Encounters:   03/29/18 271 lb (122.9 kg)   03/21/18 273 lb (123.8 kg)   03/14/18 260 lb (117.9 kg)                 Today's Medication Changes          These changes are accurate as of 3/29/18  7:58 AM.  If you have any questions, ask your nurse or doctor.               Start taking these medicines.        Dose/Directions    lisinopril 20 MG tablet   Commonly known as:  PRINIVIL/ZESTRIL   Used for:  Essential hypertension, benign   Started by:  Kirill Beal MD        Dose:  20 mg   Take 1 tablet (20 mg) by mouth daily   Quantity:  30 tablet   Refills:  1            Where to get your medicines      These medications were sent to [facility] [address] [place] [address] [address] AT [facility] [address]  [address] , [facility] [address]     Phone:  [phone]     amLODIPine 5 MG tablet    lisinopril 20 MG tablet                Primary Care Provider Office Phone # Fax #    Kirill Beal [phone]       [address]  [place] [address]        Equal Access to Services     JAMISON SLOAN AH: Xavier Garrett, chester monroe, david kaalmaralf navas, amie momin. So [facility] [place] [phone].    ATENCIÓN: Si habla español, tiene a jauregui disposición servicios gratuitos de asistencia lingüística. Llame al [phone].    We comply with applicable federal civil rights laws and [place] laws. We do not discriminate  on the basis of race, color, national origin, age, disability, sex, sexual orientation, or gender identity.            Thank you!     Thank you for choosing [facility] [place]  for your care. Our goal is always to provide you with excellent care. Hearing back from our patients is one way we can continue to improve our services. Please take a few minutes to complete the written survey that you may receive in the mail after your visit with us. Thank you!             Your Updated Medication List - Protect others around you: Learn how to safely use, store and throw away your medicines at www.disposemymeds.org.          This list is accurate as of 3/29/18  7:58 AM.  Always use your most recent med list.                   Brand Name Dispense Instructions for use Diagnosis    amLODIPine 5 MG tablet    NORVASC    30 tablet    Take 1 tablet (5 mg) by mouth daily    Essential hypertension, benign       hydrochlorothiazide 25 MG tablet    HYDRODIURIL    30 tablet    Take 1 tablet (25 mg) by mouth daily    Essential hypertension, Asymptomatic hypertensive urgency       lisinopril 20 MG tablet    PRINIVIL/ZESTRIL    30 tablet    Take 1 tablet (20 mg) by mouth daily    Essential hypertension, benign       prochlorperazine 10 MG tablet    COMPAZINE    5 tablet    Take 1 tablet (10 mg) by mouth every 8 hours as needed for nausea or other (headache not responsive to tylenol alone)

## 2018-03-29 NOTE — PROGRESS NOTES
SUBJECTIVE:   Brian Flynn is a 62 year old male who presents to clinic today for the following health issues:      Hypertension Follow-up  Patient was initially diagnosed with high blood pressure almost 2 weeks ago in the ER.  He visited ER secondary to headaches.  He was started on amlodipine 5 mg and he follow-up after that.  His blood pressure was still elevated so we added hydrochlorothiazide.  He noticed some improvement at home with the wrist blood pressure monitor.  He did not change much of his lifestyle but he thinks he is trying to be more active.  Blood pressure in the office today is still elevated in the 170s-180s range.  He is asymptomatic.  He denies any chest pain shortness of breath.    Outpatient blood pressures are being checked at home.  Results are 120s-140s /80s-90s.    Low Salt Diet: low salt      Amount of exercise or physical activity: 6-7 days/week for an average of 45-60 minutes    Problems taking medications regularly: No    Medication side effects: none    Diet: low salt            Problem list and histories reviewed & adjusted, as indicated.  Additional history: as documented    Patient Active Problem List   Diagnosis     Obesity     Essential hypertension, benign     Redundant prepuce and phimosis     DERMATITIS LEGS     Borderline high cholesterol     CARDIOVASCULAR SCREENING; LDL GOAL LESS THAN 130     Past Surgical History:   Procedure Laterality Date     CIRCUMCISION,CLAMP  11/07    for Severe phimosis with balanoposthitis       Social History   Substance Use Topics     Smoking status: Never Smoker     Smokeless tobacco: Never Used     Alcohol use No     Family History   Problem Relation Age of Onset     DIABETES No family hx of      Hypertension Father      CEREBROVASCULAR DISEASE Father      age 69     Breast Cancer No family hx of      Cancer - colorectal No family hx of      Prostate Cancer No family hx of      CANCER Mother      ovarian     Asthma Father           Current Outpatient Prescriptions   Medication Sig Dispense Refill     amLODIPine (NORVASC) 5 MG tablet Take 1 tablet (5 mg) by mouth daily 30 tablet 3     lisinopril (PRINIVIL/ZESTRIL) 20 MG tablet Take 1 tablet (20 mg) by mouth daily 30 tablet 1     hydrochlorothiazide (HYDRODIURIL) 25 MG tablet Take 1 tablet (25 mg) by mouth daily 30 tablet 1     prochlorperazine (COMPAZINE) 10 MG tablet Take 1 tablet (10 mg) by mouth every 8 hours as needed for nausea or other (headache not responsive to tylenol alone) (Patient not taking: Reported on 3/29/2018) 5 tablet 0     [DISCONTINUED] amLODIPine (NORVASC) 5 MG tablet Take 1 tablet (5 mg) by mouth daily 30 tablet 0       Reviewed and updated as needed this visit by clinical staff       Reviewed and updated as needed this visit by Provider         ROS:  CONSTITUTIONAL: NEGATIVE for fever, chills, change in weight  ENT/MOUTH: NEGATIVE for ear, mouth and throat problems  RESP: NEGATIVE for significant cough or SOB  CV: NEGATIVE for chest pain, palpitations or peripheral edema    OBJECTIVE:                                                    BP (!) 176/92  Pulse 80  Temp 98  F (36.7  C) (Tympanic)  Wt 271 lb (122.9 kg)  SpO2 96%  BMI 50.87 kg/m2  Body mass index is 50.87 kg/(m^2).   GENERAL: healthy, alert, well nourished, well hydrated, no distress  HENT: ear canals- normal; TMs- normal; Nose- normal; Mouth- no ulcers, no lesions  NECK: no tenderness, no adenopathy, no asymmetry, no masses, no stiffness; thyroid- normal to palpation  RESP: lungs clear to auscultation - no rales, no rhonchi, no wheezes  CV: regular rates and rhythm, normal S1 S2, no S3 or S4 and no murmur, no click or rub -  ABDOMEN: soft, no tenderness, no  hepatosplenomegaly, no masses, normal bowel sounds         ASSESSMENT/PLAN:                                                        ICD-10-CM    1. Essential hypertension, benign I10 amLODIPine (NORVASC) 5 MG tablet     lisinopril (PRINIVIL/ZESTRIL)  20 MG tablet     Echocardiogram Complete       Patient is asymptomatic currently.  His blood pressure is still on the upper side.  We discussed about lifestyle changes in detail.  He needs to lose significant amount of weight and eat healthy.  Currently he is on amlodipine and hydrochlorothiazide.  His blood pressure is still on the upper side.  We discussed about adding lisinopril to his medication.  He is advised to take lisinopril or hydrochlorothiazide in the morning and amlodipine at night.  Check your blood pressure once or twice every day.  I will also order echocardiogram to make sure his heart structurally is okay.  If all the results are stable and his blood pressure continue to be high we will see if sleep evaluation might be appropriate given his body habitus.  Warning signs were discussed with the patient in detail.  He will follow-up in 2 weeks for recheck.    Kirill Beal MD  [facility]

## 2018-04-04 ENCOUNTER — HOSPITAL ENCOUNTER (OUTPATIENT)
Dept: CARDIOLOGY | Facility: CLINIC | Age: 63
Discharge: HOME | End: 2018-04-04
Attending: FAMILY MEDICINE | Admitting: FAMILY MEDICINE
Payer: COMMERCIAL

## 2018-04-04 DIAGNOSIS — I10 ESSENTIAL HYPERTENSION, BENIGN: ICD-10-CM

## 2018-04-04 PROCEDURE — 93306 TTE W/DOPPLER COMPLETE: CPT | Mod: 26 | Performed by: INTERNAL MEDICINE

## 2018-04-04 PROCEDURE — 25500064 ZZH RX 255 OP 636: Performed by: FAMILY MEDICINE

## 2018-04-04 RX ADMIN — HUMAN ALBUMIN MICROSPHERES AND PERFLUTREN 3 ML: 10; .22 INJECTION, SOLUTION INTRAVENOUS at 08:35

## 2018-04-12 ENCOUNTER — OFFICE VISIT (OUTPATIENT)
Dept: FAMILY MEDICINE | Facility: CLINIC | Age: 63
End: 2018-04-12
Payer: COMMERCIAL

## 2018-04-12 VITALS
OXYGEN SATURATION: 95 % | TEMPERATURE: 97.9 F | WEIGHT: 270 LBS | HEART RATE: 82 BPM | DIASTOLIC BLOOD PRESSURE: 82 MMHG | SYSTOLIC BLOOD PRESSURE: 160 MMHG | BODY MASS INDEX: 50.68 KG/M2

## 2018-04-12 DIAGNOSIS — I10 ESSENTIAL HYPERTENSION, BENIGN: Primary | ICD-10-CM

## 2018-04-12 DIAGNOSIS — Z12.11 SCREEN FOR COLON CANCER: ICD-10-CM

## 2018-04-12 DIAGNOSIS — Z11.59 NEED FOR HEPATITIS C SCREENING TEST: ICD-10-CM

## 2018-04-12 LAB
ALBUMIN SERPL-MCNC: 3.9 G/DL (ref 3.4–5)
ALP SERPL-CCNC: 89 U/L (ref 40–150)
ALT SERPL W P-5'-P-CCNC: 35 U/L (ref 0–70)
ANION GAP SERPL CALCULATED.3IONS-SCNC: 6 MMOL/L (ref 3–14)
AST SERPL W P-5'-P-CCNC: 20 U/L (ref 0–45)
BILIRUB SERPL-MCNC: 1.1 MG/DL (ref 0.2–1.3)
BUN SERPL-MCNC: 24 MG/DL (ref 7–30)
CALCIUM SERPL-MCNC: 9.2 MG/DL (ref 8.5–10.1)
CHLORIDE SERPL-SCNC: 107 MMOL/L (ref 94–109)
CO2 SERPL-SCNC: 29 MMOL/L (ref 20–32)
CREAT SERPL-MCNC: 1.13 MG/DL (ref 0.66–1.25)
GFR SERPL CREATININE-BSD FRML MDRD: 65 ML/MIN/1.7M2
GLUCOSE SERPL-MCNC: 103 MG/DL (ref 70–99)
POTASSIUM SERPL-SCNC: 5 MMOL/L (ref 3.4–5.3)
PROT SERPL-MCNC: 8 G/DL (ref 6.8–8.8)
SODIUM SERPL-SCNC: 142 MMOL/L (ref 133–144)

## 2018-04-12 PROCEDURE — 99214 OFFICE O/P EST MOD 30 MIN: CPT | Performed by: FAMILY MEDICINE

## 2018-04-12 PROCEDURE — 86803 HEPATITIS C AB TEST: CPT | Performed by: FAMILY MEDICINE

## 2018-04-12 PROCEDURE — 80053 COMPREHEN METABOLIC PANEL: CPT | Performed by: FAMILY MEDICINE

## 2018-04-12 PROCEDURE — 36415 COLL VENOUS BLD VENIPUNCTURE: CPT | Performed by: FAMILY MEDICINE

## 2018-04-12 RX ORDER — AMLODIPINE BESYLATE 5 MG/1
5 TABLET ORAL DAILY
Qty: 90 TABLET | Refills: 1 | Status: SHIPPED | OUTPATIENT
Start: 2018-04-12 | End: 2018-07-12

## 2018-04-12 RX ORDER — LISINOPRIL 20 MG/1
20 TABLET ORAL DAILY
Qty: 90 TABLET | Refills: 0 | Status: CANCELLED | OUTPATIENT
Start: 2018-04-12

## 2018-04-12 RX ORDER — LISINOPRIL AND HYDROCHLOROTHIAZIDE 20; 25 MG/1; MG/1
1 TABLET ORAL DAILY
Qty: 90 TABLET | Refills: 1 | Status: SHIPPED | OUTPATIENT
Start: 2018-04-12 | End: 2018-07-12

## 2018-04-12 RX ORDER — HYDROCHLOROTHIAZIDE 25 MG/1
25 TABLET ORAL DAILY
Qty: 90 TABLET | Refills: 0 | Status: CANCELLED | OUTPATIENT
Start: 2018-04-12

## 2018-04-12 NOTE — MR AVS SNAPSHOT
After Visit Summary   4/12/2018    Brian Flynn    MRN: [id number]           Patient Information     Date Of Birth          1955        Visit Information        Provider Department      4/12/2018 8:00 AM Kirill Beal MD [facility] [place]        Today's Diagnoses     Essential hypertension, benign    -  1    Screen for colon cancer        Need for hepatitis C screening test           Follow-ups after your visit        Additional Services     GASTROENTEROLOGY ADULT REF PROCEDURE ONLY [facility] [phone]       Last Lab Result: Creatinine (mg/dL)       Date                     Value                 03/14/2018               1.03             ----------  Body mass index is 50.68 kg/(m^2).     Needed:  No  Language:  English    Patient will be contacted to schedule procedure.     Please be aware that coverage of these services is subject to the terms and limitations of your health insurance plan.  Call member services at your health plan with any benefit or coverage questions.  Any procedures must be performed at a [facility] facility OR coordinated by your clinic's referral office.    Please bring the following with you to your appointment:    (1) Any X-Rays, CTs or MRIs which have been performed.  Contact the facility where they were done to arrange for  prior to your scheduled appointment.    (2) List of current medications   (3) This referral request   (4) Any documents/labs given to you for this referral                  Follow-up notes from your care team     Return in about 3 months (around 7/12/2018) for HTN check.      Who to contact     If you have questions or need follow up information about today's clinic visit or your schedule please contact [facility] [place] directly at [phone].  Normal or non-critical lab and imaging results will be communicated to you by MyChart, letter or phone within 4 business days after the clinic has  "received the results. If you do not hear from us within 7 days, please contact the clinic through [employer] or phone. If you have a critical or abnormal lab result, we will notify you by phone as soon as possible.  Submit refill requests through [employer] or call your pharmacy and they will forward the refill request to us. Please allow 3 business days for your refill to be completed.          Additional Information About Your Visit        [employer] Information     [employer] lets you send messages to your doctor, view your test results, renew your prescriptions, schedule appointments and more. To sign up, go to [email or web address] . Click on \"Log in\" on the left side of the screen, which will take you to the Welcome page. Then click on \"Sign up Now\" on the right side of the page.     You will be asked to enter the access code listed below, as well as some personal information. Please follow the directions to create your username and password.     Your access code is: [email or web address]  Expires: 2018 10:17 AM     Your access code will  in 90 days. If you need help or a new code, please call your [place] clinic or [phone].        Care EveryWhere ID     This is your Care EveryWhere ID. This could be used by other organizations to access your [place] medical records  [id number]        Your Vitals Were     Pulse Temperature Pulse Oximetry BMI (Body Mass Index)          82 97.9  F (36.6  C) (Tympanic) 95% 50.68 kg/m2         Blood Pressure from Last 3 Encounters:   18 160/82   18 (!) 176/92   18 (!) 184/96    Weight from Last 3 Encounters:   18 270 lb (122.5 kg)   18 271 lb (122.9 kg)   18 273 lb (123.8 kg)              We Performed the Following     Comprehensive metabolic panel     GASTROENTEROLOGY ADULT REF PROCEDURE ONLY [facility] [phone]     Hepatitis C Screen Reflex to HCV RNA Quant and Genotype          Today's Medication Changes          These changes " are accurate as of 4/12/18  8:23 AM.  If you have any questions, ask your nurse or doctor.               Start taking these medicines.        Dose/Directions    lisinopril-hydrochlorothiazide 20-25 MG per tablet   Commonly known as:  PRINZIDE/ZESTORETIC   Used for:  Essential hypertension, benign   Started by:  Kirill Beal MD        Dose:  1 tablet   Take 1 tablet by mouth daily   Quantity:  90 tablet   Refills:  1         Stop taking these medicines if you haven't already. Please contact your care team if you have questions.     hydrochlorothiazide 25 MG tablet   Commonly known as:  HYDRODIURIL   Stopped by:  Kirill Beal MD           lisinopril 20 MG tablet   Commonly known as:  PRINIVIL/ZESTRIL   Stopped by:  Kirill Beal MD                Where to get your medicines      These medications were sent to [facility] [address] [place] [address] [address] AT [facility]  [address], [facility] [address]     Phone:  [phone]     amLODIPine 5 MG tablet    lisinopril-hydrochlorothiazide 20-25 MG per tablet                Primary Care Provider Office Phone # Fax #    Kirill Beal [phone]       [address]  [place] [address]        Equal Access to Services     [facility] AH: Hadii aad ku hadasho Soomaali, waaxda luqadaha, qaybta kaalmada adeegyada, waxay angelin haylizzeth momin. So [facility] [phone].    ATENCIÓN: Si habla español, tiene a jauregui disposición servicios gratuitos de asistencia lingüística. Llame al [phone].    We comply with applicable federal civil rights laws and [place] laws. We do not discriminate on the basis of race, color, national origin, age, disability, sex, sexual orientation, or gender identity.            Thank you!     Thank you for choosing [facility] [place]  for your care. Our goal is always to provide you with excellent care. Hearing back from our patients is one way we can  continue to improve our services. Please take a few minutes to complete the written survey that you may receive in the mail after your visit with us. Thank you!             Your Updated Medication List - Protect others around you: Learn how to safely use, store and throw away your medicines at www.disposemymeds.org.          This list is accurate as of 4/12/18  8:23 AM.  Always use your most recent med list.                   Brand Name Dispense Instructions for use Diagnosis    amLODIPine 5 MG tablet    NORVASC    90 tablet    Take 1 tablet (5 mg) by mouth daily    Essential hypertension, benign       lisinopril-hydrochlorothiazide 20-25 MG per tablet    PRINZIDE/ZESTORETIC    90 tablet    Take 1 tablet by mouth daily    Essential hypertension, benign       prochlorperazine 10 MG tablet    COMPAZINE    5 tablet    Take 1 tablet (10 mg) by mouth every 8 hours as needed for nausea or other (headache not responsive to tylenol alone)

## 2018-04-12 NOTE — PROGRESS NOTES
SUBJECTIVE:   Brian Flynn is a 62 year old male who presents to clinic today for the following health issues:      Hypertension Follow-up      Outpatient blood pressures are being checked at home.  Results are normal, 110-140/63-90.    Low Salt Diet: no added salt      Amount of exercise or physical activity: 5-6 days per week - [employer]     Problems taking medications regularly: No    Medication side effects: none    Diet: low salt    Patient came today for follow-up on his high blood pressure.  He was initially evaluated in the ER later evaluated in the clinic.  He was started on amlodipine related with hydrochlorothiazide and lisinopril.  Slightly stable at home.  He does get nervous when he comes to the doctor's office.  Since taking that medication his blood pressure is stable.  Patient is an echocardiogram done which was essentially normal except some mild hypertrophy due to elevated blood pressure for a long period of time.        Problem list and histories reviewed & adjusted, as indicated.  Additional history: as documented    Patient Active Problem List   Diagnosis     Obesity     Essential hypertension, benign     Redundant prepuce and phimosis     DERMATITIS LEGS     Borderline high cholesterol     CARDIOVASCULAR SCREENING; LDL GOAL LESS THAN 130     Past Surgical History:   Procedure Laterality Date     CIRCUMCISION,CLAMP  11/07    for Severe phimosis with balanoposthitis       Social History   Substance Use Topics     Smoking status: Never Smoker     Smokeless tobacco: Never Used     Alcohol use No     Family History   Problem Relation Age of Onset     DIABETES No family hx of      Hypertension Father      CEREBROVASCULAR DISEASE Father      age 69     Breast Cancer No family hx of      Cancer - colorectal No family hx of      Prostate Cancer No family hx of      CANCER Mother      ovarian     Asthma Father          Current Outpatient Prescriptions   Medication Sig Dispense Refill      amLODIPine (NORVASC) 5 MG tablet Take 1 tablet (5 mg) by mouth daily 90 tablet 1     lisinopril-hydrochlorothiazide (PRINZIDE/ZESTORETIC) 20-25 MG per tablet Take 1 tablet by mouth daily 90 tablet 1     [DISCONTINUED] amLODIPine (NORVASC) 5 MG tablet Take 1 tablet (5 mg) by mouth daily 30 tablet 3     prochlorperazine (COMPAZINE) 10 MG tablet Take 1 tablet (10 mg) by mouth every 8 hours as needed for nausea or other (headache not responsive to tylenol alone) (Patient not taking: Reported on 3/29/2018) 5 tablet 0       Reviewed and updated as needed this visit by clinical staff  Tobacco  Allergies  Meds  Soc Hx      Reviewed and updated as needed this visit by Provider         ROS:  CONSTITUTIONAL: NEGATIVE for fever, chills, change in weight  ENT/MOUTH: NEGATIVE for ear, mouth and throat problems  RESP: NEGATIVE for significant cough or SOB  CV: NEGATIVE for chest pain, palpitations or peripheral edema    OBJECTIVE:                                                    /82  Pulse 82  Temp 97.9  F (36.6  C) (Tympanic)  Wt 270 lb (122.5 kg)  SpO2 95%  BMI 50.68 kg/m2  Body mass index is 50.68 kg/(m^2).   GENERAL: healthy, alert, well nourished, well hydrated, no distress  HENT: ear canals- normal; TMs- normal; Nose- normal; Mouth- no ulcers, no lesions  NECK: no tenderness, no adenopathy, no asymmetry, no masses, no stiffness; thyroid- normal to palpation  RESP: lungs clear to auscultation - no rales, no rhonchi, no wheezes  CV: regular rates and rhythm, normal S1 S2, no S3 or S4 and no murmur, no click or rub -         ASSESSMENT/PLAN:                                                        ICD-10-CM    1. Essential hypertension, benign I10 amLODIPine (NORVASC) 5 MG tablet     lisinopril-hydrochlorothiazide (PRINZIDE/ZESTORETIC) 20-25 MG per tablet     Comprehensive metabolic panel   2. Screen for colon cancer Z12.11 GASTROENTEROLOGY ADULT REF PROCEDURE ONLY [facility] [phone]   3. Need for  hepatitis C screening test Z11.59 Hepatitis C Screen Reflex to HCV RNA Quant and Genotype       Patient blood pressure readings at home are slight stable.  In the office they are slightly elevated recheck was slight better.  I would continue on the same dose of medication.  He is advised low-salt diet, weight loss.  Will recheck him in 3 months.  If blood pressure continue to be high at 3 months we may need to consider sleep evaluation to rule out any sleep apnea..  Chest pain shortness of breath and similar warning signs were discussed with the patient in detail.  We will send patient home and amlodipine along with combination medication of hydrochlorothiazide and lisinopril.    Kirill Beal MD  [facility]

## 2018-04-12 NOTE — LETTER
April 13, 2018      Brian Flynn  [address]  [facility] [address]        Dear ,      I have reviewed your recent labs. Here are the results:     -Liver and gallbladder tests are normal. (ALT,AST, Alk phos, bilirubin), kidney function is normal (Cr, GFR), Sodium is normal, Potassium is normal, Calcium is normal, Glucose is normal (diabetes screening test).   -Hepatitis C antibody screen test shows no signs of a previous hepatitis C infection.   -Please follow-up in the clinic as discussed for high blood pressure.     Resulted Orders   Hepatitis C Screen Reflex to HCV RNA Quant and Genotype   Result Value Ref Range    Hepatitis C Antibody Nonreactive NR^Nonreactive      Comment:      Assay performance characteristics have not been established for newborns,   infants, and children     Comprehensive metabolic panel   Result Value Ref Range    Sodium 142 133 - 144 mmol/L    Potassium 5.0 3.4 - 5.3 mmol/L    Chloride 107 94 - 109 mmol/L    Carbon Dioxide 29 20 - 32 mmol/L    Anion Gap 6 3 - 14 mmol/L    Glucose 103 (H) 70 - 99 mg/dL      Comment:      Non Fasting    Urea Nitrogen 24 7 - 30 mg/dL    Creatinine 1.13 0.66 - 1.25 mg/dL    GFR Estimate 65 >60 mL/min/1.7m2      Comment:      Non  GFR Calc    GFR Estimate If Black 79 >60 mL/min/1.7m2      Comment:       GFR Calc    Calcium 9.2 8.5 - 10.1 mg/dL    Bilirubin Total 1.1 0.2 - 1.3 mg/dL    Albumin 3.9 3.4 - 5.0 g/dL    Protein Total 8.0 6.8 - 8.8 g/dL    Alkaline Phosphatase 89 40 - 150 U/L    ALT 35 0 - 70 U/L    AST 20 0 - 45 U/L       If you have any questions or concerns, please call the clinic at the number listed above.       Sincerely,  Kirill Beal MD

## 2018-04-13 LAB — HCV AB SERPL QL IA: NONREACTIVE

## 2018-05-22 ENCOUNTER — TRANSFERRED RECORDS (OUTPATIENT)
Dept: HEALTH INFORMATION MANAGEMENT | Facility: CLINIC | Age: 63
End: 2018-05-22

## 2018-07-12 ENCOUNTER — OFFICE VISIT (OUTPATIENT)
Dept: FAMILY MEDICINE | Facility: CLINIC | Age: 63
End: 2018-07-12
Payer: COMMERCIAL

## 2018-07-12 VITALS
OXYGEN SATURATION: 97 % | HEIGHT: 69 IN | SYSTOLIC BLOOD PRESSURE: 162 MMHG | HEART RATE: 79 BPM | WEIGHT: 263.4 LBS | BODY MASS INDEX: 39.01 KG/M2 | DIASTOLIC BLOOD PRESSURE: 79 MMHG | TEMPERATURE: 96.4 F

## 2018-07-12 DIAGNOSIS — E66.01 MORBID OBESITY (H): Primary | ICD-10-CM

## 2018-07-12 DIAGNOSIS — Z13.6 CARDIOVASCULAR SCREENING; LDL GOAL LESS THAN 130: ICD-10-CM

## 2018-07-12 DIAGNOSIS — Z23 NEED FOR PROPHYLACTIC VACCINATION WITH TETANUS-DIPHTHERIA (TD): ICD-10-CM

## 2018-07-12 DIAGNOSIS — I10 ESSENTIAL HYPERTENSION, BENIGN: ICD-10-CM

## 2018-07-12 DIAGNOSIS — R06.83 SNORING: ICD-10-CM

## 2018-07-12 LAB
CHOLEST SERPL-MCNC: 192 MG/DL
HDLC SERPL-MCNC: 33 MG/DL
LDLC SERPL CALC-MCNC: 125 MG/DL
NONHDLC SERPL-MCNC: 159 MG/DL
TRIGL SERPL-MCNC: 169 MG/DL

## 2018-07-12 PROCEDURE — 36415 COLL VENOUS BLD VENIPUNCTURE: CPT | Performed by: FAMILY MEDICINE

## 2018-07-12 PROCEDURE — 99214 OFFICE O/P EST MOD 30 MIN: CPT | Performed by: FAMILY MEDICINE

## 2018-07-12 PROCEDURE — 80061 LIPID PANEL: CPT | Performed by: FAMILY MEDICINE

## 2018-07-12 RX ORDER — LISINOPRIL AND HYDROCHLOROTHIAZIDE 20; 25 MG/1; MG/1
1 TABLET ORAL DAILY
Qty: 90 TABLET | Refills: 1 | Status: SHIPPED | OUTPATIENT
Start: 2018-07-12 | End: 2019-01-10

## 2018-07-12 RX ORDER — AMLODIPINE BESYLATE 10 MG/1
10 TABLET ORAL DAILY
Qty: 90 TABLET | Refills: 1 | Status: SHIPPED | OUTPATIENT
Start: 2018-07-12 | End: 2019-01-10

## 2018-07-12 NOTE — PROGRESS NOTES
SUBJECTIVE:   Brian Flynn is a 63 year old male who presents to clinic today for the following health issues:      Hypertension Follow-up      Outpatient blood pressures are being checked at home.  Results are average 120/70.    Low Salt Diet: monitoring salt      Amount of exercise or physical activity: 6-7 days/week for an average of 45-60 minutes    Problems taking medications regularly: No    Medication side effects: none    Diet: low salt      Patient is currently on amlodipine 5 mg and lisinopril hydrochlorothiazide combination.  He does have whitecoat hypertension.  His home readings are pretty stable but whenever he comes to the clinic his blood pressure is high.  He denies any chest pain shortness of breath.      Problem list and histories reviewed & adjusted, as indicated.  Additional history: as documented    Patient Active Problem List   Diagnosis     Essential hypertension, benign     Redundant prepuce and phimosis     Borderline high cholesterol     CARDIOVASCULAR SCREENING; LDL GOAL LESS THAN 130     Morbid obesity (H)     Past Surgical History:   Procedure Laterality Date     CIRCUMCISION,CLAMP  11/07    for Severe phimosis with balanoposthitis       Social History   Substance Use Topics     Smoking status: Never Smoker     Smokeless tobacco: Never Used     Alcohol use No     Family History   Problem Relation Age of Onset     Cancer Mother      ovarian     Hypertension Father      Cerebrovascular Disease Father      age 69     Asthma Father      Diabetes No family hx of      Breast Cancer No family hx of      Cancer - colorectal No family hx of      Prostate Cancer No family hx of          Current Outpatient Prescriptions   Medication Sig Dispense Refill     amLODIPine (NORVASC) 10 MG tablet Take 1 tablet (10 mg) by mouth daily 90 tablet 1     lisinopril-hydrochlorothiazide (PRINZIDE/ZESTORETIC) 20-25 MG per tablet Take 1 tablet by mouth daily 90 tablet 1     [DISCONTINUED] amLODIPine  "(NORVASC) 5 MG tablet Take 1 tablet (5 mg) by mouth daily 90 tablet 1     [DISCONTINUED] lisinopril-hydrochlorothiazide (PRINZIDE/ZESTORETIC) 20-25 MG per tablet Take 1 tablet by mouth daily 90 tablet 1       Reviewed and updated as needed this visit by clinical staff       Reviewed and updated as needed this visit by Provider         ROS:  CONSTITUTIONAL: NEGATIVE for fever, chills, change in weight  ENT/MOUTH: NEGATIVE for ear, mouth and throat problems  RESP: NEGATIVE for significant cough or SOB  CV: NEGATIVE for chest pain, palpitations or peripheral edema    OBJECTIVE:                                                    /79  Pulse 79  Temp 96.4  F (35.8  C) (Tympanic)  Ht 5' 8.5\" (1.74 m)  Wt 263 lb 6.4 oz (119.5 kg)  SpO2 97%  BMI 39.46 kg/m2  Body mass index is 39.46 kg/(m^2).   GENERAL: healthy, alert, well nourished, well hydrated, no distress  HENT: ear canals- normal; TMs- normal; Nose- normal; Mouth- no ulcers, no lesions  NECK: no tenderness, no adenopathy, no asymmetry, no masses, no stiffness; thyroid- normal to palpation  RESP: lungs clear to auscultation - no rales, no rhonchi, no wheezes  CV: regular rates and rhythm, normal S1 S2, no S3 or S4 and no murmur, no click or rub -       ASSESSMENT/PLAN:                                                        ICD-10-CM    1. Morbid obesity (H) E66.01 Lipid panel reflex to direct LDL Fasting     SLEEP EVALUATION & MANAGEMENT REFERRAL - [facility] [phone]  (Age 18 and up)   2. Essential hypertension, benign I10 amLODIPine (NORVASC) 10 MG tablet     lisinopril-hydrochlorothiazide (PRINZIDE/ZESTORETIC) 20-25 MG per tablet     SLEEP EVALUATION & MANAGEMENT REFERRAL - [facility] [phone]  (Age 18 and up)   3. Need for prophylactic vaccination with tetanus-diphtheria (TD) Z23    4. CARDIOVASCULAR SCREENING; LDL GOAL LESS THAN 130 Z13.6 Lipid panel reflex to direct LDL Fasting   5. " Snoring R06.83 SLEEP EVALUATION & MANAGEMENT REFERRAL - ADULT -[facility] [phone]  (Age 18 and up)       I will order cholesterol labs and will follow up on that.  I will also increase the dose of amlodipine to 10 mg from 5 mg to see if that helps with his numbers.  He does have morbid obesity along with other risk factors including sedentary lifestyle.  I advised him to do low-salt diet, regular exercise.  Patient has morbid obesity and he has some issues with snoring at night.  Underlying sleep apnea could be because of his not very well controlled blood pressure.  I will suggest getting a sleep study in future to make sure he does not have any obstructive sleep apnea.    Kirill Beal MD  [facility] [place]

## 2018-07-12 NOTE — LETTER
July 12, 2018      Brian Flynn  [address]  [facility] [address]        Dear Brian,     I have reviewed your recent labs. Here are the results:    -LDL(bad) cholesterol level is elevated, HDL(good) cholesterol level is low and your triglycerides are elevated which can increase your heart disease risk.  A diet high in fat and simple carbohydrates, genetics and being overweight can contribute to this. ADVISE: Exercise, a low fat, low carbohydrate diet, weight control, and omega-3 fatty acids (fish oil) [phone] mg daily are helpful to improve this.  Rechecking your fasting cholesterol panel in 6 months is recommended (Lipid w/ LDL reflex, DX: hyperlipidemia)      Kirill Beal MD  7/12/2018

## 2018-07-12 NOTE — MR AVS SNAPSHOT
After Visit Summary   7/12/2018    Brian Flynn    MRN: [id number]           Patient Information     Date Of Birth          1955        Visit Information        Provider Department      7/12/2018 9:00 AM Kirill Beal MD [facility]        Today's Diagnoses     Morbid obesity (H)    -  1    Essential hypertension, benign        Need for prophylactic vaccination with tetanus-diphtheria (TD)        CARDIOVASCULAR SCREENING; LDL GOAL LESS THAN 130        Snoring           Follow-ups after your visit        Additional Services     SLEEP EVALUATION & MANAGEMENT REFERRAL - [facility] [phone]  (Age 18 and up)       Please be aware that coverage of these services is subject to the terms and limitations of your health insurance plan.  Call member services at your health plan with any benefit or coverage questions.      Please bring the following to your appointment:    >>   List of current medications   >>   This referral request   >>   Any documents/labs given to you for this referral                      Follow-up notes from your care team     Return in about 3 months (around 10/12/2018) for HTN check.      Your next 10 appointments already scheduled     Oct 12, 2018  8:40 AM CDT   Office Visit with Kirill Beal MD   [facility] [place] ([facility]    [address] [place] [id number] [phone]           Bring a current list of meds and any records pertaining to this visit. For Physicals, please bring immunization records and any forms needing to be filled out. Please arrive 10 minutes early to complete paperwork.              Future tests that were ordered for you today     Open Future Orders        Priority Expected Expires Ordered    SLEEP EVALUATION & MANAGEMENT REFERRAL - [facility] [phone]  (Age 18 and up) Routine  7/12/2019 7/12/2018            Who to  "contact     If you have questions or need follow up information about today's clinic visit or your schedule please contact [facility] [place] directly at [phone].  Normal or non-critical lab and imaging results will be communicated to you by MyChart, letter or phone within 4 business days after the clinic has received the results. If you do not hear from us within 7 days, please contact the clinic through MyChart or phone. If you have a critical or abnormal lab result, we will notify you by phone as soon as possible.  Submit refill requests through [employer] or call your pharmacy and they will forward the refill request to us. Please allow 3 business days for your refill to be completed.          Additional Information About Your Visit        Care EveryWhere ID     This is your Care EveryWhere ID. This could be used by other organizations to access your [place] medical records  [id number]        Your Vitals Were     Pulse Temperature Height Pulse Oximetry BMI (Body Mass Index)       79 96.4  F (35.8  C) (Tympanic) 5' 8.5\" (1.74 m) 97% 39.46 kg/m2        Blood Pressure from Last 3 Encounters:   07/12/18 162/79   04/12/18 160/82   03/29/18 (!) 176/92    Weight from Last 3 Encounters:   07/12/18 263 lb 6.4 oz (119.5 kg)   04/12/18 270 lb (122.5 kg)   03/29/18 271 lb (122.9 kg)              We Performed the Following     Lipid panel reflex to direct LDL Fasting          Today's Medication Changes          These changes are accurate as of 7/12/18  9:21 AM.  If you have any questions, ask your nurse or doctor.               These medicines have changed or have updated prescriptions.        Dose/Directions    amLODIPine 10 MG tablet   Commonly known as:  NORVASC   This may have changed:    - medication strength  - how much to take   Used for:  Essential hypertension, benign   Changed by:  Kirill Beal MD        Dose:  10 mg   Take 1 tablet (10 mg) by mouth daily   Quantity:  90 tablet   Refills:  1       "   Stop taking these medicines if you haven't already. Please contact your care team if you have questions.     prochlorperazine 10 MG tablet   Commonly known as:  COMPAZINE   Stopped by:  Kirill Beal MD                Where to get your medicines      These medications were sent to [id number] - [place], [place] - [address] AT [facility] [address]  [address], [facility] [address]     Phone:  [phone]     amLODIPine 10 MG tablet    lisinopril-hydrochlorothiazide 20-25 MG per tablet                Primary Care Provider Office Phone # Fax #    Kirill Beal [phone]       [address]  [place] [address]        Equal Access to Services     : Hadii aad ku hadasho Soomaali, waaxda luqadaha, qaybta kaalmada adeegyada, waxay angelin hayjosepn megan stubbs . So [facility] [phone].    ATENCIÓN: Si habla español, tiene a jauregui disposición servicios gratuitos de asistencia lingüística. [facility] [phone].    We comply with applicable federal civil rights laws and [place] laws. We do not discriminate on the basis of race, color, national origin, age, disability, sex, sexual orientation, or gender identity.            Thank you!     Thank you for choosing [facility] [place]  for your care. Our goal is always to provide you with excellent care. Hearing back from our patients is one way we can continue to improve our services. Please take a few minutes to complete the written survey that you may receive in the mail after your visit with us. Thank you!             Your Updated Medication List - Protect others around you: Learn how to safely use, store and throw away your medicines at www.disposemymeds.org.          This list is accurate as of 7/12/18  9:21 AM.  Always use your most recent med list.                   Brand Name Dispense Instructions for use Diagnosis    amLODIPine 10 MG tablet    NORVASC    90 tablet     Take 1 tablet (10 mg) by mouth daily    Essential hypertension, benign       lisinopril-hydrochlorothiazide 20-25 MG per tablet    PRINZIDE/ZESTORETIC    90 tablet    Take 1 tablet by mouth daily    Essential hypertension, benign

## 2018-10-12 ENCOUNTER — OFFICE VISIT (OUTPATIENT)
Dept: FAMILY MEDICINE | Facility: CLINIC | Age: 63
End: 2018-10-12
Payer: COMMERCIAL

## 2018-10-12 VITALS
HEART RATE: 76 BPM | SYSTOLIC BLOOD PRESSURE: 170 MMHG | TEMPERATURE: 98.2 F | BODY MASS INDEX: 41.05 KG/M2 | WEIGHT: 274 LBS | DIASTOLIC BLOOD PRESSURE: 80 MMHG

## 2018-10-12 DIAGNOSIS — I10 UNCONTROLLED HYPERTENSION: ICD-10-CM

## 2018-10-12 DIAGNOSIS — E66.01 MORBID OBESITY (H): ICD-10-CM

## 2018-10-12 DIAGNOSIS — Z23 NEED FOR PROPHYLACTIC VACCINATION AND INOCULATION AGAINST INFLUENZA: Primary | ICD-10-CM

## 2018-10-12 DIAGNOSIS — I10 ESSENTIAL HYPERTENSION, BENIGN: ICD-10-CM

## 2018-10-12 PROCEDURE — 90682 RIV4 VACC RECOMBINANT DNA IM: CPT | Performed by: FAMILY MEDICINE

## 2018-10-12 PROCEDURE — 99214 OFFICE O/P EST MOD 30 MIN: CPT | Mod: 25 | Performed by: FAMILY MEDICINE

## 2018-10-12 PROCEDURE — 90471 IMMUNIZATION ADMIN: CPT | Performed by: FAMILY MEDICINE

## 2018-10-12 RX ORDER — METOPROLOL SUCCINATE 50 MG/1
50 TABLET, EXTENDED RELEASE ORAL DAILY
Qty: 90 TABLET | Refills: 1 | Status: SHIPPED | OUTPATIENT
Start: 2018-10-12 | End: 2019-01-10

## 2018-10-12 NOTE — MR AVS SNAPSHOT
After Visit Summary   10/12/2018    Brian Flynn    MRN: [id number]           Patient Information     Date Of Birth          1955        Visit Information        Provider Department      10/12/2018 8:40 AM Kirill Bael MD [facility]        Today's Diagnoses     Need for prophylactic vaccination and inoculation against influenza    -  1    Essential hypertension, benign        Morbid obesity (H)        Uncontrolled hypertension           Follow-ups after your visit        Additional Services     SLEEP EVALUATION & MANAGEMENT REFERRAL - [facility] [phone]  (Age 18 and up)       Please be aware that coverage of these services is subject to the terms and limitations of your health insurance plan.  Call member services at your health plan with any benefit or coverage questions.      Please bring the following to your appointment:    >>   List of current medications   >>   This referral request   >>   Any documents/labs given to you for this referral                      Follow-up notes from your care team     Return in about 3 months (around 1/12/2019) for HTN check.      Your next 10 appointments already scheduled     Yoshi 10, 2019  9:20 AM CST   Office Visit with Kirill Beal MD   [facility] [place] ([facility])    [address] [place] [id number] [phone]           Bring a current list of meds and any records pertaining to this visit. For Physicals, please bring immunization records and any forms needing to be filled out. Please arrive 10 minutes early to complete paperwork.              Future tests that were ordered for you today     Open Future Orders        Priority Expected Expires Ordered    SLEEP EVALUATION & MANAGEMENT REFERRAL - [facility] [phone]  (Age 18 and up) Routine  10/12/2019 10/12/2018            Who to contact     If you have  questions or need follow up information about today's clinic visit or your schedule please contact [facility] [place] directly at [phone].  Normal or non-critical lab and imaging results will be communicated to you by MyChart, letter or phone within 4 business days after the clinic has received the results. If you do not hear from us within 7 days, please contact the clinic through MyChart or phone. If you have a critical or abnormal lab result, we will notify you by phone as soon as possible.  Submit refill requests through [employer] or call your pharmacy and they will forward the refill request to us. Please allow 3 business days for your refill to be completed.          Additional Information About Your Visit        Care EveryWhere ID     This is your Care EveryWhere ID. This could be used by other organizations to access your [place] medical records  [id number]        Your Vitals Were     Pulse Temperature BMI (Body Mass Index)             76 98.2  F (36.8  C) (Tympanic) 41.05 kg/m2          Blood Pressure from Last 3 Encounters:   10/12/18 170/80   07/12/18 162/79   04/12/18 160/82    Weight from Last 3 Encounters:   10/12/18 274 lb (124.3 kg)   07/12/18 263 lb 6.4 oz (119.5 kg)   04/12/18 270 lb (122.5 kg)              We Performed the Following     FLU VACCINE, (RIV4) RECOMBINANT HA  , IM (FluBlok, egg free) [79052]- >18 YRS ([facility] recommended  50-64 YRS)     Vaccine Administration, Initial [10031]          Today's Medication Changes          These changes are accurate as of 10/12/18  9:24 AM.  If you have any questions, ask your nurse or doctor.               Start taking these medicines.        Dose/Directions    metoprolol succinate 50 MG 24 hr tablet   Commonly known as:  TOPROL-XL   Used for:  Essential hypertension, benign, Uncontrolled hypertension   Started by:  Kirill Beal MD        Dose:  50 mg   Take 1 tablet (50 mg) by mouth daily   Quantity:  90 tablet   Refills:  1             Where to get your medicines      These medications were sent to [id number] - [place], [place] - [address] AT [facility] [address]  [address], [place] [address]     Phone:  [phone]     metoprolol succinate 50 MG 24 hr tablet                Primary Care Provider Office Phone # Fax #    Kirill Beal [phone]       [address]  [place] [address]        Equal Access to Services     [facility]: Hadii aad ku hadasho Soomaali, waaxda luqadaha, qaybta kaalmada adeegyada, waxay angelin hayaan megan stubbs . So [facility] [phone].    ATENCIÓN: Si habla español, tiene a jauregui disposición servicios gratuitos de asistencia lingüística. [facility] [phone].    We comply with applicable federal civil rights laws and [place] laws. We do not discriminate on the basis of race, color, national origin, age, disability, sex, sexual orientation, or gender identity.            Thank you!     Thank you for choosing [facility] [place]  for your care. Our goal is always to provide you with excellent care. Hearing back from our patients is one way we can continue to improve our services. Please take a few minutes to complete the written survey that you may receive in the mail after your visit with us. Thank you!             Your Updated Medication List - Protect others around you: Learn how to safely use, store and throw away your medicines at www.disposemymeds.org.          This list is accurate as of 10/12/18  9:24 AM.  Always use your most recent med list.                   Brand Name Dispense Instructions for use Diagnosis    amLODIPine 10 MG tablet    NORVASC    90 tablet    Take 1 tablet (10 mg) by mouth daily    Essential hypertension, benign       lisinopril-hydrochlorothiazide 20-25 MG per tablet    PRINZIDE/ZESTORETIC    90 tablet    Take 1 tablet by mouth daily    Essential hypertension, benign       metoprolol  succinate 50 MG 24 hr tablet    TOPROL-XL    90 tablet    Take 1 tablet (50 mg) by mouth daily    Essential hypertension, benign, Uncontrolled hypertension

## 2018-10-12 NOTE — PROGRESS NOTES
SUBJECTIVE:   Brian Flynn is a 63 year old male who presents to clinic today for the following health issues:      Hypertension Follow-up  Patient came today for follow-up on his blood pressure.  He has been checking his blood pressure with a wrist cuff and it has been on the normal side however blood pressure in the clinic is elevated.  He denies any chest pain no shortness of breath.  He does have morbid obesity and his lifestyle is not very active.  His food choices are not the great.  His portion size has increased.  He denies any other symptoms.  Echocardiogram done in the past show mild hypertrophy due to hypertension.    Outpatient blood pressures are being checked at home with wrist cuff.  Results are normal.    Low Salt Diet: low salt      Amount of exercise or physical activity: 5-6 days/week for an average of greater than 60 minutes    Problems taking medications regularly: No    Medication side effects: none    Diet: regular (no restrictions)            Problem list and histories reviewed & adjusted, as indicated.  Additional history: as documented    Patient Active Problem List   Diagnosis     Essential hypertension, benign     Redundant prepuce and phimosis     Borderline high cholesterol     CARDIOVASCULAR SCREENING; LDL GOAL LESS THAN 130     Morbid obesity (H)     Past Surgical History:   Procedure Laterality Date     CIRCUMCISION,CLAMP  11/07    for Severe phimosis with balanoposthitis       Social History   Substance Use Topics     Smoking status: Never Smoker     Smokeless tobacco: Never Used     Alcohol use No     Family History   Problem Relation Age of Onset     Cancer Mother      ovarian     Hypertension Father      Cerebrovascular Disease Father      age 69     Asthma Father      Diabetes No family hx of      Breast Cancer No family hx of      Cancer - colorectal No family hx of      Prostate Cancer No family hx of          Current Outpatient Prescriptions   Medication Sig Dispense  Refill     amLODIPine (NORVASC) 10 MG tablet Take 1 tablet (10 mg) by mouth daily 90 tablet 1     lisinopril-hydrochlorothiazide (PRINZIDE/ZESTORETIC) 20-25 MG per tablet Take 1 tablet by mouth daily 90 tablet 1     metoprolol succinate (TOPROL-XL) 50 MG 24 hr tablet Take 1 tablet (50 mg) by mouth daily 90 tablet 1       Reviewed and updated as needed this visit by clinical staff  Tobacco  Allergies  Meds       Reviewed and updated as needed this visit by Provider         ROS:  CONSTITUTIONAL: NEGATIVE for fever, chills, change in weight  ENT/MOUTH: NEGATIVE for ear, mouth and throat problems  RESP: NEGATIVE for significant cough or SOB  CV: NEGATIVE for chest pain, palpitations or peripheral edema    OBJECTIVE:                                                    /80  Pulse 76  Temp 98.2  F (36.8  C) (Tympanic)  Wt 274 lb (124.3 kg)  BMI 41.05 kg/m2  Body mass index is 41.05 kg/(m^2).   GENERAL: healthy, alert, well nourished, well hydrated, no distress  HENT: ear canals- normal; TMs- normal; Nose- normal; Mouth- no ulcers, no lesions  NECK: no tenderness, no adenopathy, no asymmetry, no masses, no stiffness; thyroid- normal to palpation  RESP: lungs clear to auscultation - no rales, no rhonchi, no wheezes  CV: regular rates and rhythm, normal S1 S2, no S3 or S4 and no murmur, no click or rub -           ASSESSMENT/PLAN:                                                        ICD-10-CM    1. Need for prophylactic vaccination and inoculation against influenza Z23 FLU VACCINE, (RIV4) RECOMBINANT HA  , IM (FluBlok, egg free) [62460]- >18 YRS ([facility] recommended  50-64 YRS)     Vaccine Administration, Initial [28072]   2. Essential hypertension, benign I10 SLEEP EVALUATION & MANAGEMENT REFERRAL - [facility] [phone]  (Age 18 and up)     metoprolol succinate (TOPROL-XL) 50 MG 24 hr tablet   3. Morbid obesity (H) E66.01 SLEEP EVALUATION & MANAGEMENT REFERRAL - [facility]  [facility] [phone]  (Age 18 and up)   4. Uncontrolled hypertension I10 SLEEP EVALUATION & MANAGEMENT REFERRAL - ADULT -[facility] [phone]  (Age 18 and up)     metoprolol succinate (TOPROL-XL) 50 MG 24 hr tablet     Patient continued to have elevated blood pressure despite slight better readings at home with wrist cuff.  We discussed in detail regarding high blood pressure and his implication.  He is currently on amlodipine 10 mg along with lisinopril/hydrochlorothiazide combination.  He has gained some weight over the last 3 months.  He contributed that to eating not very healthy.  I suggested we need to do lifestyle changes including eating healthy, regular exercise.  We also discussed previously and also discussed today the cause of his blood pressure could be underlying sleep apnea due to his obesity.  I would suggest getting a sleep evaluation done to make sure it is not any obstructive sleep apnea which could be causing the symptoms.  We will add metoprolol 50 mg to his regimen.  He is advised to take half a tablet for the first week and if he tolerated well he can continue with the full tablet of metoprolol.  Follow-up in 3 months for recheck    Kirill Beal MD  [facility]    Injectable Influenza Immunization Documentation    1.  Is the person to be vaccinated sick today?   No    2. Does the person to be vaccinated have an allergy to a component   of the vaccine?   No  Egg Allergy Algorithm Link    3. Has the person to be vaccinated ever had a serious reaction   to influenza vaccine in the past?   No    4. Has the person to be vaccinated ever had Guillain-Barré syndrome?   No    Form completed by Marquis SHRESTHA CMA

## 2018-11-09 ENCOUNTER — OFFICE VISIT (OUTPATIENT)
Dept: SLEEP MEDICINE | Facility: CLINIC | Age: 63
End: 2018-11-09
Attending: FAMILY MEDICINE
Payer: COMMERCIAL

## 2018-11-09 VITALS
BODY MASS INDEX: 39.31 KG/M2 | TEMPERATURE: 97.8 F | HEART RATE: 76 BPM | HEIGHT: 70 IN | RESPIRATION RATE: 16 BRPM | OXYGEN SATURATION: 94 % | WEIGHT: 274.6 LBS | DIASTOLIC BLOOD PRESSURE: 78 MMHG | SYSTOLIC BLOOD PRESSURE: 178 MMHG

## 2018-11-09 DIAGNOSIS — I10 ESSENTIAL HYPERTENSION, BENIGN: ICD-10-CM

## 2018-11-09 DIAGNOSIS — R29.818 SUSPECTED SLEEP APNEA: Primary | ICD-10-CM

## 2018-11-09 DIAGNOSIS — E66.01 MORBID OBESITY (H): ICD-10-CM

## 2018-11-09 DIAGNOSIS — I10 UNCONTROLLED HYPERTENSION: ICD-10-CM

## 2018-11-09 DIAGNOSIS — R06.83 SNORING: ICD-10-CM

## 2018-11-09 PROCEDURE — 99204 OFFICE O/P NEW MOD 45 MIN: CPT | Performed by: INTERNAL MEDICINE

## 2018-11-09 NOTE — MR AVS SNAPSHOT
After Visit Summary   11/9/2018    Brian Flynn    MRN: [id number]           Patient Information     Date Of Birth          1955        Visit Information        Provider Department      11/9/2018 8:30 AM Ravi Espinal MD [facility]        Today's Diagnoses     Suspected sleep apnea    -  1    Uncontrolled hypertension        Essential hypertension, benign        Morbid obesity (H)        Snoring          Care Instructions      Your BMI is Body mass index is 39.4 kg/(m^2).  Weight management is a personal decision.  If you are interested in exploring weight loss strategies, the following discussion covers the approaches that may be successful. Body mass index (BMI) is one way to tell whether you are at a healthy weight, overweight, or obese. It measures your weight in relation to your height.  A BMI of 18.5 to 24.9 is in the healthy range. A person with a BMI of 25 to 29.9 is considered overweight, and someone with a BMI of 30 or greater is considered obese. More than two-thirds of American adults are considered overweight or obese.  Being overweight or obese increases the risk for further weight gain. Excess weight may lead to heart disease and diabetes.  Creating and following plans for healthy eating and physical activity may help you improve your health.  Weight control is part of healthy lifestyle and includes exercise, emotional health, and healthy eating habits. Careful eating habits lifelong are the mainstay of weight control. Though there are significant health benefits from weight loss, long-term weight loss with diet alone may be very difficult to achieve- studies show long-term success with dietary management in less than 10% of people. Attaining a healthy weight may be especially difficult to achieve in those with severe obesity. In some cases, medications, devices and surgical management might be considered.  What can you do?  If you are overweight or obese and  are interested in methods for weight loss, you should discuss this with your provider.     Consider reducing daily calorie intake by 500 calories.     Keep a food journal.     Avoiding skipping meals, consider cutting portions instead.    Diet combined with exercise helps maintain muscle while optimizing fat loss. Strength training is particularly important for building and maintaining muscle mass. Exercise helps reduce stress, increase energy, and improves fitness. Increasing exercise without diet control, however, may not burn enough calories to loose weight.       Start walking three days a week 10-20 minutes at a time    Work towards walking thirty minutes five days a week     Eventually, increase the speed of your walking for 1-2 minutes at time    In addition, we recommend that you review healthy lifestyles and methods for weight loss available through the National Institutes of Health patient information sites:  http://win.niddk.nih.gov/publications/index.htm    And look into health and wellness programs that may be available through your health insurance provider, employer, local community center, or georges club.    Weight management plan: Patient was referred to their PCP to discuss a diet and exercise plan.            Follow-ups after your visit        Your next 10 appointments already scheduled     Nov 26, 2018  1:00 PM CST   HST  with BED 7 SH SLEEP   [facility] ([facility])    [address] [address]   [phone]            Nov 27, 2018  9:45 AM CST   HST Drop Off with BED 7 SH SLEEP   [facility] ([facility])    [address] [address]   [phone]            Dec 04, 2018 10:30 AM CST   Return Sleep Patient with Ravi Espinal MD   [facility] ([facility])    [address] [address]  "  [phone]            Yoshi 10, 2019  9:20 AM CST   Office Visit with Kirill Beal MD   [facility] ([facility])    [address] [place] [address] [phone]           Bring a current list of meds and any records pertaining to this visit. For Physicals, please bring immunization records and any forms needing to be filled out. Please arrive 10 minutes early to complete paperwork.              Future tests that were ordered for you today     Open Future Orders        Priority Expected Expires Ordered    HST-Home Sleep Apnea Test Routine  5/11/2019 11/9/2018            Who to contact     If you have questions or need follow up information about today's clinic visit or your schedule please contact [facility] directly at [phone].  Normal or non-critical lab and imaging results will be communicated to you by MyChart, letter or phone within 4 business days after the clinic has received the results. If you do not hear from us within 7 days, please contact the clinic through MyChart or phone. If you have a critical or abnormal lab result, we will notify you by phone as soon as possible.  Submit refill requests through Crunchbutton or call your pharmacy and they will forward the refill request to us. Please allow 3 business days for your refill to be completed.          Additional Information About Your Visit        Care EveryWhere ID     This is your Care EveryWhere ID. This could be used by other organizations to access your [facility] medical records  [id number]        Your Vitals Were     Pulse Temperature Respirations Height Pulse Oximetry BMI (Body Mass Index)    76 97.8  F (36.6  C) (Oral) 16 1.778 m (5' 10\") 94% 39.4 kg/m2       Blood Pressure from Last 3 Encounters:   11/09/18 178/78   10/12/18 170/80   07/12/18 162/79    Weight from Last 3 Encounters:   11/09/18 124.6 kg (274 lb 9.6 oz)   10/12/18 124.3 kg (274 lb)   07/12/18 119.5 kg " (263 lb 6.4 oz)              We Performed the Following     SLEEP EVALUATION & MANAGEMENT REFERRAL - ADULT -[facility] [phone]  (Age 18 and up)        Primary Care Provider Office Phone # Fax #    Kirill Beal [phone]       [address]  [place] [address]        Equal Access to Services     [facility] LUTHER : Hadii aad ku hadasho Soomaali, waaxda luqadaha, qaybta kaalmada adeegyada, waxay idiin hayaan adeeg kharash la'aan ah. So [facility] [phone].    ATENCIÓN: Si habla español, tiene a jauregui disposición servicios gratuitos de asistencia lingüística. Llame al [phone].    We comply with applicable federal civil rights laws and [place] laws. We do not discriminate on the basis of race, color, national origin, age, disability, sex, sexual orientation, or gender identity.            Thank you!     Thank you for choosing [facility]  for your care. Our goal is always to provide you with excellent care. Hearing back from our patients is one way we can continue to improve our services. Please take a few minutes to complete the written survey that you may receive in the mail after your visit with us. Thank you!             Your Updated Medication List - Protect others around you: Learn how to safely use, store and throw away your medicines at www.disposemymeds.org.          This list is accurate as of 11/9/18  9:18 AM.  Always use your most recent med list.                   Brand Name Dispense Instructions for use Diagnosis    amLODIPine 10 MG tablet    NORVASC    90 tablet    Take 1 tablet (10 mg) by mouth daily    Essential hypertension, benign       lisinopril-hydrochlorothiazide 20-25 MG per tablet    PRINZIDE/ZESTORETIC    90 tablet    Take 1 tablet by mouth daily    Essential hypertension, benign       metoprolol succinate 50 MG 24 hr tablet    TOPROL-XL    90 tablet    Take 1 tablet (50 mg) by mouth daily    Essential hypertension, benign,  Uncontrolled hypertension

## 2018-11-09 NOTE — PATIENT INSTRUCTIONS

## 2018-11-09 NOTE — NURSING NOTE
"Chief Complaint   Patient presents with     Sleep Problem     Doctor referral       Initial /78  Pulse 76  Temp 97.8  F (36.6  C) (Oral)  Resp 16  Ht 1.778 m (5' 10\")  Wt 124.6 kg (274 lb 9.6 oz)  SpO2 94%  BMI 39.4 kg/m2 Estimated body mass index is 39.4 kg/(m^2) as calculated from the following:    Height as of this encounter: 1.778 m (5' 10\").    Weight as of this encounter: 124.6 kg (274 lb 9.6 oz).    Medication Reconciliation: complete     ESS 2  Neck 47cm  Eve Kauffman        "

## 2018-11-09 NOTE — PROGRESS NOTES
Sleep Consultation:    Date on this visit: 11/9/2018    Brian Flynn is sent by Kirill Beal for a sleep consultation regarding possible sleep apnea and uncontrolled hypertension.    Primary Physician: Kirill Beal     Chief complaint: uncontrolled hypertension     Presenting History:     Brian Flynn has been sent by his primary care physician for an evaluation possible sleep apnea contributing to uncontrolled hypertension.     Brian does snore infrequently. Patient does not have a regular bed partner. Patient's wife sleeps in a different room with door open. She has occasionally heard snoring.There is report of snoring.  He does not have witnessed apneas. They always sleep separately.  Patient sleeps on his back and side. He denies no snort arousals, morning dry mouth, morning headaches and restless legs. Brian denies any bruxism, sleep walking, sleep talking, dream enactment, sleep paralysis, cataplexy and hypnogogic/hypnopompic hallucinations.    Brian goes to sleep at 9:00 PM during the week. He wakes up at 5:00 AM without an alarm. He falls asleep in 5 minutes.  Brian denies difficulty falling asleep.  He wakes up 1 times a night for 5 minutes before falling back to sleep.  Brian wakes up to go to the bathroom.  On weekends, Brian goes to sleep at 9:00 PM.  He wakes up at 5:00 AM without an alarm. He falls asleep in 5 minutes.  Patient gets an average of 8 hours of sleep per night.     Patient does not use electronics in bed.     Brian is retired.      He denies sleep walking as a child.  Brian denies difficulty breathing through his nose.       Patient's [place] Sleepiness score 2/24 consistent with no daytime sleepiness.      Brian naps 1-2 times per week for 30 minutes, feels refreshed after naps. He takes no inadvertant naps.  He denies closing eyes, dozing and falling asleep while driving.  Patient was counseled on the importance of driving while alert, to pull over if drowsy,  or nap before getting into the vehicle if sleepy.      He uses no caffeine.     Allergies:    Allergies   Allergen Reactions     Fluticasone Propionate      Sped system up for one week prior to one use       Medications:    Current Outpatient Prescriptions   Medication Sig Dispense Refill     amLODIPine (NORVASC) 10 MG tablet Take 1 tablet (10 mg) by mouth daily 90 tablet 1     lisinopril-hydrochlorothiazide (PRINZIDE/ZESTORETIC) 20-25 MG per tablet Take 1 tablet by mouth daily 90 tablet 1     metoprolol succinate (TOPROL-XL) 50 MG 24 hr tablet Take 1 tablet (50 mg) by mouth daily 90 tablet 1       Problem List:  Patient Active Problem List    Diagnosis Date Noted     Morbid obesity (H) 07/12/2018     Priority: Medium     CARDIOVASCULAR SCREENING; LDL GOAL LESS THAN 130 10/31/2010     Priority: Medium     Borderline high cholesterol 01/06/2009     Priority: Medium     Essential hypertension, benign 07/13/2007     Priority: Medium     Redundant prepuce and phimosis 07/13/2007     Priority: Medium        Past Medical/Surgical History:  Past Medical History:   Diagnosis Date     BENIGN HYPERTENSION 7/13/2007     BLIND Right Eye     since birth     Obesity, unspecified      Past Surgical History:   Procedure Laterality Date     CIRCUMCISION,CLAMP  11/07    for Severe phimosis with balanoposthitis       Social History:  Social History     Social History     Marital status:      Spouse name: Yvonne     Number of children: 3     Years of education: N/A     Occupational History     [employer]      [place]     Social History Main Topics     Smoking status: Never Smoker     Smokeless tobacco: Never Used     Alcohol use No     Drug use: No     Sexual activity: Yes     Partners: Female     Other Topics Concern      Service No     Blood Transfusions Yes     Caffeine Concern No     Occupational Exposure Yes     Hobby Hazards No     Sleep Concern No     Stress Concern No     Weight Concern Yes      Special Diet No     Back Care No     Exercise Yes     Bike Helmet Yes     Seat Belt Yes     Self-Exams No     Social History Narrative       Family History:    No family history of sleep disorders.     Family History   Problem Relation Age of Onset     Cancer Mother      ovarian     Hypertension Father      Cerebrovascular Disease Father      age 69     Asthma Father      Diabetes No family hx of      Breast Cancer No family hx of      Cancer - colorectal No family hx of      Prostate Cancer No family hx of        Review of Systems:  A complete review of systems reviewed by me is negative with the exeption of what has been mentioned in the history of present illness.  CONSTITUTIONAL: NEGATIVE for weight gain/loss, fever, chills, sweats or night sweats, drug allergies.  EYES: NEGATIVE for changes in vision, blind spots, double vision.  ENT: NEGATIVE for ear pain, sore throat, sinus pain, post-nasal drip, runny nose, bloody nose  CARDIAC: NEGATIVE for fast heartbeats or fluttering in chest, chest pain or pressure, breathlessness when lying flat, swollen legs or swollen feet.  NEUROLOGIC: NEGATIVE headaches, weakness or numbness in the arms or legs.  DERMATOLOGIC: NEGATIVE for rashes, new moles or change in mole(s)  PULMONARY: NEGATIVE SOB at rest, SOB with activity, dry cough, productive cough, coughing up blood, wheezing or whistling when breathing.    GASTROINTESTINAL: NEGATIVE for nausea or vomitting, loose or watery stools, fat or grease in stools, constipation, abdominal pain, bowel movements black in color or blood noted.  GENITOURINARY: NEGATIVE for pain during urination, blood in urine, urinating more frequently than usual, irregular menstrual periods.  MUSCULOSKELETAL: NEGATIVE for muscle pain, bone or joint pain, swollen joints.  ENDOCRINE: NEGATIVE for increased thirst or urination, diabetes.  LYMPHATIC: NEGATIVE for swollen lymph nodes, lumps or bumps in the breasts or nipple discharge.    Physical  "Examination:  Vitals: /78  Pulse 76  Temp 97.8  F (36.6  C) (Oral)  Resp 16  Ht 1.778 m (5' 10\")  Wt 124.6 kg (274 lb 9.6 oz)  SpO2 94%  BMI 39.4 kg/m2  BMI= Body mass index is 39.4 kg/(m^2).    Neck Cir (cm): 47 cm    Jewell Total Score 11/9/2018   Total score - [place] 2       MIC Total Score: 1 (11/09/18 0803)    GENERAL APPEARANCE: healthy, alert and no distress  EYES: Eyes grossly normal to inspection, PERRL and conjunctivae and sclerae normal  HENT: nose and mouth without ulcers or lesions, oropharynx crowded, uvula elongated and soft palate dependent  NECK: no adenopathy, no asymmetry, masses, or scars and thyroid normal to palpation  RESP: lungs clear to auscultation - no rales, rhonchi or wheezes  CV: regular rates and rhythm, normal S1 S2, no S3 or S4 and no murmur, click or rub  ABDOMEN: soft, nontender, without hepatosplenomegaly or masses  MS: extremities normal- no gross deformities noted  NEURO: Normal strength and tone, mentation intact and speech normal  PSYCH: mentation appears normal and affect normal/bright  Mallampati Class: IV.  Tonsillar Stage: 1  hidden by pillars.    Impression/Plan:    1. To rule out obstructive sleep apnea  2. Hypertension     - Patient, 62 yo male, with BMI 39, neck circumference 47 cm, is sent by primary care for assessment of sleep apnea in the context of uncontrolled hypertension. There is a history of intermittent snoring but we do not have good collateral information regarding other symptoms of sleep disordered breathing as he sleep separately from his wife. Oropharynx is crowded on examination, There is an intermittent to high risk for sleep apnea. An overnight sleep test is recommended. Patient wants home sleep testing.     Plan:     1. Home sleep apnea testing     He will follow up with me in approximately two weeks after his sleep study has been competed to review the results and discuss plan of care.       Polysomnography & HST " reviewed.  Limitations of HST reviewed.   Obstructive sleep apnea reviewed.  Complications of untreated sleep apnea were reviewed.    I spent a total of 45 minutes with patient with more than 50% in counseling     Dr. Ravi Espinal      CC: Kirill Beal

## 2018-11-26 ENCOUNTER — OFFICE VISIT (OUTPATIENT)
Dept: SLEEP MEDICINE | Facility: CLINIC | Age: 63
End: 2018-11-26
Payer: COMMERCIAL

## 2018-11-26 DIAGNOSIS — I10 UNCONTROLLED HYPERTENSION: ICD-10-CM

## 2018-11-26 DIAGNOSIS — R29.818 SUSPECTED SLEEP APNEA: ICD-10-CM

## 2018-11-26 DIAGNOSIS — R06.83 SNORING: ICD-10-CM

## 2018-11-26 DIAGNOSIS — E66.01 MORBID OBESITY (H): ICD-10-CM

## 2018-11-26 PROCEDURE — G0399 HOME SLEEP TEST/TYPE 3 PORTA: HCPCS

## 2018-11-26 NOTE — MR AVS SNAPSHOT
After Visit Summary   11/26/2018    Brian Flynn    MRN: [id number]           Patient Information     Date Of Birth          1955        Visit Information        Provider Department      11/26/2018 1:00 PM BED 7 SH SLEEP [facility]        Today's Diagnoses     Uncontrolled hypertension        Morbid obesity (H)        Suspected sleep apnea        Snoring           Follow-ups after your visit        Your next 10 appointments already scheduled     Nov 27, 2018  9:45 AM CST   HST Drop Off with BED 7 SH SLEEP   [facility] ([facility])    [address]  [facility] [address]   [phone]            Dec 04, 2018 10:30 AM CST   Return Sleep Patient with Ravi Espinal MD   [facility] ([facility])    [address] [address] [phone]            Yoshi 10, 2019  9:20 AM CST   Office Visit with Kirill Beal MD   [facility] ([address] [address] [phone]           Bring a current list of meds and any records pertaining to this visit. For Physicals, please bring immunization records and any forms needing to be filled out. Please arrive 10 minutes early to complete paperwork.              Who to contact     If you have questions or need follow up information about today's clinic visit or your schedule please contact [facility] directly at [phone].  Normal or non-critical lab and imaging results will be communicated to you by MyChart, letter or phone within 4 business days after the clinic has received the results. If you do not hear from us within 7 days, please contact the clinic through [employer] 19hart or phone. If you have a critical or abnormal lab result, we will notify you by phone as soon as possible.  Submit refill requests through PrimÃ¢â‚[employer]â„[employer]Visiont or call  your pharmacy and they will forward the refill request to us. Please allow 3 business days for your refill to be completed.          Additional Information About Your Visit        Care EveryWhere ID     This is your Care EveryWhere ID. This could be used by other organizations to access your [facility] medical records  [id number]         Blood Pressure from Last 3 Encounters:   11/09/18 178/78   10/12/18 170/80   07/12/18 162/79    Weight from Last 3 Encounters:   11/09/18 124.6 kg (274 lb 9.6 oz)   10/12/18 124.3 kg (274 lb)   07/12/18 119.5 kg (263 lb 6.4 oz)              We Performed the Following     HST-Home Sleep Apnea Test        Primary Care Provider Office Phone # Fax #    Kirill Beal [phone]       [address]  [place] [address]        Equal Access to Services     [facility]: Hadii aad ku hadasho Soomaali, waaxda luqadaha, qaybta kaalmada adeegyada, waxay idiin hayaan megan melendezn . So [facility] [phone].    ATENCIÓN: Si habla español, tiene a jauregui disposición servicios gratuitos de asistencia lingüística. [place] al [phone].    We comply with applicable federal civil rights laws and [place] laws. We do not discriminate on the basis of race, color, national origin, age, disability, sex, sexual orientation, or gender identity.            Thank you!     Thank you for choosing [facility]  for your care. Our goal is always to provide you with excellent care. Hearing back from our patients is one way we can continue to improve our services. Please take a few minutes to complete the written survey that you may receive in the mail after your visit with us. Thank you!             Your Updated Medication List - Protect others around you: Learn how to safely use, store and throw away your medicines at www.disposemymeds.org.          This list is accurate as of 11/26/18  3:42 PM.  Always use your most recent med list.                   Brand Name  Dispense Instructions for use Diagnosis    amLODIPine 10 MG tablet    NORVASC    90 tablet    Take 1 tablet (10 mg) by mouth daily    Essential hypertension, benign       lisinopril-hydrochlorothiazide 20-25 MG per tablet    PRINZIDE/ZESTORETIC    90 tablet    Take 1 tablet by mouth daily    Essential hypertension, benign       metoprolol succinate 50 MG 24 hr tablet    TOPROL-XL    90 tablet    Take 1 tablet (50 mg) by mouth daily    Essential hypertension, benign, Uncontrolled hypertension

## 2018-11-27 ENCOUNTER — DOCUMENTATION ONLY (OUTPATIENT)
Dept: SLEEP MEDICINE | Facility: CLINIC | Age: 63
End: 2018-11-27
Payer: COMMERCIAL

## 2018-11-27 NOTE — PROCEDURES
"HOME SLEEP STUDY INTERPRETATION    Patient: Brian Flynn  MRN: [id number]  YOB: 1955  Study Date: 2018  Referring Provider: Kirill Beal;  Ordering Provider: Ravi Espinal MD     Indications for Home Study: Brian Flynn is a 63 year old male with a history of hypertension who presents with symptoms suggestive of obstructive sleep apnea.    Estimated body mass index is 39.4 kg/(m^2) as calculated from the following:    Height as of 18: 1.778 m (5' 10\").    Weight as of 18: 124.6 kg (274 lb 9.6 oz).  Total score - [place]: 2 (2018  8:04 AM)  STOP-BAN/8    Data: A full night home sleep study was performed recording the standard physiologic parameters including body position, movement, sound, nasal pressure, thermal oral airflow, chest and abdominal movements with respiratory inductance plethysmography, and oxygen saturation by pulse oximetry. Pulse rate was estimated by oximetry recording. This study was considered adequate based on > 4 hours of quality oximetry and respiratory recording. As specified by the AASM Manual for the Scoring of Sleep and Associated events, version 2.3, Rule VIII.D 1B, 4% oxygen desaturation scoring for hypopneas is used as a standard of care on all home sleep apnea testing.    Analysis Time:  456.4 minutes    Respiration:   Sleep Associated Hypoxemia: sustained hypoxemia was present. Baseline oxygen saturation was 92%.  Time with saturation less than or equal to 88% was 9.6 minutes. The lowest oxygen saturation was 85%.   Snoring: Snoring was present.  Respiratory events: The home study revealed a presence of 16 obstructive apneas and 0 mixed and 2 central apneas. There were 14 hypopneas resulting in a combined apnea/hypopnea index [AHI] of 4.2 events per hour.  AHI was 10 per hour supine, - per hour prone, 2.3 per hour on left side, and - per hour on right side.   Pattern: Excluding events noted above, respiratory rate and pattern was " Normal.    Position: Percent of time spent: supine - 25%, prone - 0%, on left - 74%, on right - 0%.    Heart Rate: By pulse oximetry normal rate was noted.     Assessment:   Negative for sleep apnea.    Recommendations:  If clinical suspicion for sleep apnea remains, consider in lab PSG for accurate assessment of sleep disordered breathing..  Suggest optimizing sleep hygiene and avoiding sleep deprivation.  Weight management.    Diagnosis Code(s): Snoring R06.83    Ravi Espinal MD, MD, November 27, 2018   Diplomate, American Board of Psychiatry and Neurology, Sleep Medicine

## 2018-11-27 NOTE — PROGRESS NOTES
This HSAT was performed using a Noxturnal T3 device which recorded snore, sound, movement activity, body position, nasal pressure, oronasal thermal airflow, pulse, oximetry and both chest and abdominal respiratory effort. HSAT data was restricted to the time patient states they were in bed.     HSAT was scored using 1B 4% hypopnea rule.     HST AHI (Non-PAT): (P) 4.2  Snoring was reported as mild and moderate.  Time with SpO2 below 89% was 9.6 minutes.   Overall signal quality was good.     Pt will follow up with sleep provider to determine appropriate therapy.

## 2018-12-04 ENCOUNTER — OFFICE VISIT (OUTPATIENT)
Dept: SLEEP MEDICINE | Facility: CLINIC | Age: 63
End: 2018-12-04
Payer: COMMERCIAL

## 2018-12-04 VITALS
BODY MASS INDEX: 39.51 KG/M2 | SYSTOLIC BLOOD PRESSURE: 151 MMHG | OXYGEN SATURATION: 96 % | WEIGHT: 276 LBS | DIASTOLIC BLOOD PRESSURE: 73 MMHG | TEMPERATURE: 98 F | HEIGHT: 70 IN | RESPIRATION RATE: 16 BRPM | HEART RATE: 72 BPM

## 2018-12-04 DIAGNOSIS — R06.83 SNORING: Primary | ICD-10-CM

## 2018-12-04 PROCEDURE — 99213 OFFICE O/P EST LOW 20 MIN: CPT | Performed by: INTERNAL MEDICINE

## 2018-12-04 NOTE — PROGRESS NOTES
Sleep Study Follow-Up Visit:    Date on this visit: 12/4/2018    Brian Flynn comes in today for follow-up of his home sleep study done on 11/26/2018 at the [facility] for possible sleep apnea.    Respiratory events: The home study revealed a presence of 16 obstructive apneas and 0 mixed and 2 central apneas. There were 14 hypopneas resulting in a combined apnea/hypopnea index [AHI] of 4.2 events per hour.  AHI was 10 per hour supine, - per hour prone, 2.3 per hour on left side, and - per hour on right side.   Pattern: Excluding events noted above, respiratory rate and pattern was Normal.     Sleep Associated Hypoxemia: sustained hypoxemia was present. Baseline oxygen saturation was 92%.  Time with saturation less than or equal to 88% was 9.6 minutes. The lowest oxygen saturation was 85%.   Snoring: Snoring was present.     Position: Percent of time spent: supine - 25%, prone - 0%, on left - 74%, on right - 0%.     Heart Rate: By pulse oximetry normal rate was noted.      Assessment:   Negative for sleep apnea.    These findings were reviewed with patient.     Past medical/surgical history, family history, social history, medications and allergies were reviewed.      Problem List:  Patient Active Problem List    Diagnosis Date Noted     Morbid obesity (H) 07/12/2018     Priority: Medium     CARDIOVASCULAR SCREENING; LDL GOAL LESS THAN 130 10/31/2010     Priority: Medium     Borderline high cholesterol 01/06/2009     Priority: Medium     Essential hypertension, benign 07/13/2007     Priority: Medium     Redundant prepuce and phimosis 07/13/2007     Priority: Medium        Impression/Plan:    1. Home sleep test is negative for sleep apnea      - Patient was counseled regarding home sleep test result. We had estimated 7.5 hours of sleep time. Patient estimates that sleep times was 6.5 hours. He had 25% of night in supine position and he reports that normally he doesnot sleep on his back.  This was a good recording.     - We discussed risk for false negative result. Considering good recording and supine sleep recorded in the test, missing moderate or severe sleep apnea seems unlikely. We discussed an in lab PSG to assess for sleep apnea. Patient declines this as he doesnot experience any subjective sleep apnea symptoms.        Fifteen minutes spent with patient, all of which were spent face-to-face counseling, consulting, coordinating plan of care.      Dr. Ravi Espinal     CC: Kirill Beal

## 2018-12-04 NOTE — MR AVS SNAPSHOT
After Visit Summary   12/4/2018    Brian Flynn    MRN: [id number]           Patient Information     Date Of Birth          1955        Visit Information        Provider Department      12/4/2018 10:30 AM Ravi Espinal MD [facility]        Today's Diagnoses     Snoring    -  1      Care Instructions      Your BMI is Body mass index is 39.6 kg/(m^2).  Weight management is a personal decision.  If you are interested in exploring weight loss strategies, the following discussion covers the approaches that may be successful. Body mass index (BMI) is one way to tell whether you are at a healthy weight, overweight, or obese. It measures your weight in relation to your height.  A BMI of 18.5 to 24.9 is in the healthy range. A person with a BMI of 25 to 29.9 is considered overweight, and someone with a BMI of 30 or greater is considered obese. More than two-thirds of American adults are considered overweight or obese.  Being overweight or obese increases the risk for further weight gain. Excess weight may lead to heart disease and diabetes.  Creating and following plans for healthy eating and physical activity may help you improve your health.  Weight control is part of healthy lifestyle and includes exercise, emotional health, and healthy eating habits. Careful eating habits lifelong are the mainstay of weight control. Though there are significant health benefits from weight loss, long-term weight loss with diet alone may be very difficult to achieve- studies show long-term success with dietary management in less than 10% of people. Attaining a healthy weight may be especially difficult to achieve in those with severe obesity. In some cases, medications, devices and surgical management might be considered.  What can you do?  If you are overweight or obese and are interested in methods for weight loss, you should discuss this with your provider.     Consider reducing daily calorie  intake by 500 calories.     Keep a food journal.     Avoiding skipping meals, consider cutting portions instead.    Diet combined with exercise helps maintain muscle while optimizing fat loss. Strength training is particularly important for building and maintaining muscle mass. Exercise helps reduce stress, increase energy, and improves fitness. Increasing exercise without diet control, however, may not burn enough calories to loose weight.       Start walking three days a week 10-20 minutes at a time    Work towards walking thirty minutes five days a week     Eventually, increase the speed of your walking for 1-2 minutes at time    In addition, we recommend that you review healthy lifestyles and methods for weight loss available through the National Institutes of Health patient information sites:  http://win.niddk.nih.gov/publications/index.htm    And look into health and wellness programs that may be available through your health insurance provider, employer, local community center, or georg[employer] club.    Weight management plan: Patient was referred to their PCP to discuss a diet and exercise plan.              Follow-ups after your visit        Your next 10 appointments already scheduled     Yoshi 10, 2019  9:20 AM CST   Office Visit with Kirill Beal MD   [facility] ([address] [address] [phone]           Bring a current list of meds and any records pertaining to this visit. For Physicals, please bring immunization records and any forms needing to be filled out. Please arrive 10 minutes early to complete paperwork.              Who to contact     If you have questions or need follow up information about today's clinic visit or your schedule please contact [facility] directly at [phone].  Normal or non-critical lab and imaging results will be communicated to you by MyChart, letter or phone within 4 business  "days after the clinic has received the results. If you do not hear from us within 7 days, please contact the clinic through [employer] or phone. If you have a critical or abnormal lab result, we will notify you by phone as soon as possible.  Submit refill requests through [employer] or call your pharmacy and they will forward the refill request to us. Please allow 3 business days for your refill to be completed.          Additional Information About Your Visit        Care EveryWhere ID     This is your Care EveryWhere ID. This could be used by other organizations to access your [place] medical records  [id number]        Your Vitals Were     Pulse Temperature Respirations Height Pulse Oximetry BMI (Body Mass Index)    72 98  F (36.7  C) (Oral) 16 1.778 m (5' 10\") 96% 39.6 kg/m2       Blood Pressure from Last 3 Encounters:   12/04/18 151/73   11/09/18 178/78   10/12/18 170/80    Weight from Last 3 Encounters:   12/04/18 125.2 kg (276 lb)   11/09/18 124.6 kg (274 lb 9.6 oz)   10/12/18 124.3 kg (274 lb)              Today, you had the following     No orders found for display       Primary Care Provider Office Phone # Fax #    Kirill Beal [phone]       [address]  [place] [address]        Equal Access to Services     [facility]: Hadii aad ku hadasho Soomaali, waaxda luqadaha, qaybta kaalmada eloise, amie stubbs . So [facility] [phone].    ATENCIÓN: Si habla español, tiene a jauregui disposición servicios gratuitos de asistencia lingüística. Palmira al [phone].    We comply with applicable federal civil rights laws and [place] laws. We do not discriminate on the basis of race, color, national origin, age, disability, sex, sexual orientation, or gender identity.            Thank you!     Thank you for choosing [facility]  for your care. Our goal is always to provide you with excellent care. Hearing back from our patients is one way we can " continue to improve our services. Please take a few minutes to complete the written survey that you may receive in the mail after your visit with us. Thank you!             Your Updated Medication List - Protect others around you: Learn how to safely use, store and throw away your medicines at www.disposemymeds.org.          This list is accurate as of 12/4/18 11:23 AM.  Always use your most recent med list.                   Brand Name Dispense Instructions for use Diagnosis    amLODIPine 10 MG tablet    NORVASC    90 tablet    Take 1 tablet (10 mg) by mouth daily    Essential hypertension, benign       lisinopril-hydrochlorothiazide 20-25 MG tablet    PRINZIDE/ZESTORETIC    90 tablet    Take 1 tablet by mouth daily    Essential hypertension, benign       metoprolol succinate ER 50 MG 24 hr tablet    TOPROL-XL    90 tablet    Take 1 tablet (50 mg) by mouth daily    Essential hypertension, benign, Uncontrolled hypertension

## 2018-12-04 NOTE — PATIENT INSTRUCTIONS

## 2018-12-11 ENCOUNTER — ALLIED HEALTH/NURSE VISIT (OUTPATIENT)
Dept: NURSING | Facility: CLINIC | Age: 63
End: 2018-12-11
Payer: COMMERCIAL

## 2018-12-11 DIAGNOSIS — Z23 NEED FOR VACCINATION: Primary | ICD-10-CM

## 2018-12-11 PROCEDURE — 90471 IMMUNIZATION ADMIN: CPT

## 2018-12-11 PROCEDURE — 90750 HZV VACC RECOMBINANT IM: CPT

## 2018-12-11 PROCEDURE — 99207 ZZC NO CHARGE NURSE ONLY: CPT

## 2019-01-10 ENCOUNTER — OFFICE VISIT (OUTPATIENT)
Dept: FAMILY MEDICINE | Facility: CLINIC | Age: 64
End: 2019-01-10
Payer: COMMERCIAL

## 2019-01-10 VITALS
HEART RATE: 76 BPM | BODY MASS INDEX: 40.46 KG/M2 | WEIGHT: 282 LBS | SYSTOLIC BLOOD PRESSURE: 138 MMHG | DIASTOLIC BLOOD PRESSURE: 84 MMHG | TEMPERATURE: 98.7 F

## 2019-01-10 DIAGNOSIS — E55.9 VITAMIN D DEFICIENCY: ICD-10-CM

## 2019-01-10 DIAGNOSIS — I10 ESSENTIAL HYPERTENSION, BENIGN: Primary | ICD-10-CM

## 2019-01-10 DIAGNOSIS — Z12.5 SCREENING FOR PROSTATE CANCER: ICD-10-CM

## 2019-01-10 DIAGNOSIS — I10 UNCONTROLLED HYPERTENSION: ICD-10-CM

## 2019-01-10 DIAGNOSIS — Z13.6 CARDIOVASCULAR SCREENING; LDL GOAL LESS THAN 130: ICD-10-CM

## 2019-01-10 DIAGNOSIS — E66.01 MORBID OBESITY (H): ICD-10-CM

## 2019-01-10 LAB
ALBUMIN SERPL-MCNC: 4 G/DL (ref 3.4–5)
ALP SERPL-CCNC: 78 U/L (ref 40–150)
ALT SERPL W P-5'-P-CCNC: 37 U/L (ref 0–70)
ANION GAP SERPL CALCULATED.3IONS-SCNC: 6 MMOL/L (ref 3–14)
AST SERPL W P-5'-P-CCNC: 13 U/L (ref 0–45)
BILIRUB SERPL-MCNC: 1 MG/DL (ref 0.2–1.3)
BUN SERPL-MCNC: 26 MG/DL (ref 7–30)
CALCIUM SERPL-MCNC: 9.5 MG/DL (ref 8.5–10.1)
CHLORIDE SERPL-SCNC: 104 MMOL/L (ref 94–109)
CHOLEST SERPL-MCNC: 220 MG/DL
CO2 SERPL-SCNC: 30 MMOL/L (ref 20–32)
CREAT SERPL-MCNC: 1.21 MG/DL (ref 0.66–1.25)
DEPRECATED CALCIDIOL+CALCIFEROL SERPL-MC: 18 UG/L (ref 20–75)
GFR SERPL CREATININE-BSD FRML MDRD: 63 ML/MIN/{1.73_M2}
GLUCOSE SERPL-MCNC: 120 MG/DL (ref 70–99)
HDLC SERPL-MCNC: 35 MG/DL
LDLC SERPL CALC-MCNC: 151 MG/DL
NONHDLC SERPL-MCNC: 185 MG/DL
POTASSIUM SERPL-SCNC: 4.3 MMOL/L (ref 3.4–5.3)
PROT SERPL-MCNC: 8 G/DL (ref 6.8–8.8)
PSA SERPL-MCNC: 3.53 UG/L (ref 0–4)
SODIUM SERPL-SCNC: 140 MMOL/L (ref 133–144)
TRIGL SERPL-MCNC: 171 MG/DL

## 2019-01-10 PROCEDURE — 80061 LIPID PANEL: CPT | Performed by: FAMILY MEDICINE

## 2019-01-10 PROCEDURE — 82306 VITAMIN D 25 HYDROXY: CPT | Performed by: FAMILY MEDICINE

## 2019-01-10 PROCEDURE — 84153 ASSAY OF PSA TOTAL: CPT | Performed by: FAMILY MEDICINE

## 2019-01-10 PROCEDURE — 36415 COLL VENOUS BLD VENIPUNCTURE: CPT | Performed by: FAMILY MEDICINE

## 2019-01-10 PROCEDURE — 99214 OFFICE O/P EST MOD 30 MIN: CPT | Performed by: FAMILY MEDICINE

## 2019-01-10 PROCEDURE — 80053 COMPREHEN METABOLIC PANEL: CPT | Performed by: FAMILY MEDICINE

## 2019-01-10 RX ORDER — LISINOPRIL AND HYDROCHLOROTHIAZIDE 20; 25 MG/1; MG/1
1 TABLET ORAL DAILY
Qty: 90 TABLET | Refills: 1 | Status: SHIPPED | OUTPATIENT
Start: 2019-01-10 | End: 2019-06-12

## 2019-01-10 RX ORDER — AMLODIPINE BESYLATE 10 MG/1
10 TABLET ORAL DAILY
Qty: 90 TABLET | Refills: 1 | Status: SHIPPED | OUTPATIENT
Start: 2019-01-10 | End: 2019-06-12

## 2019-01-10 RX ORDER — METOPROLOL SUCCINATE 50 MG/1
50 TABLET, EXTENDED RELEASE ORAL DAILY
Qty: 90 TABLET | Refills: 1 | Status: SHIPPED | OUTPATIENT
Start: 2019-01-10 | End: 2019-06-12

## 2019-01-10 NOTE — LETTER
January 11, 2019      Brian Flynn  [address]  [place] [facility] [address]        Dear ,      I have reviewed your recent labs. Here are the results:     -PSA (prostate specific antigen) test is normal.  This indicates a low likelihood of prostate cancer.  ADVISE: rechecking this in 1 year.   -Cholesterol level when compared to the last time are slightly worse.  At this time I would suggest work on your weight and do regular exercise.  Losing weight will help.  Will recheck again in 3-6 months.  If this continues to be elevated we may need to start a cholesterol medication.   -Liver and kidney functions are normal.   -Blood glucose is elevated but not in diabetic range.  I would suggest when you come back for the next visit we will check hemoglobin A1c which is a 3-month blood sugar which is more definitive to tell us if there is an underlying diabetes and not.   -Eat healthy low sugar and low salt diet.  Follow-up in 3-6 months for recheck.     Resulted Orders   Comprehensive metabolic panel   Result Value Ref Range    Sodium 140 133 - 144 mmol/L    Potassium 4.3 3.4 - 5.3 mmol/L    Chloride 104 94 - 109 mmol/L    Carbon Dioxide 30 20 - 32 mmol/L    Anion Gap 6 3 - 14 mmol/L    Glucose 120 (H) 70 - 99 mg/dL    Urea Nitrogen 26 7 - 30 mg/dL    Creatinine 1.21 0.66 - 1.25 mg/dL    GFR Estimate 63 >60 mL/min/[1.73_m2]      Comment:      Non  GFR Calc  Starting 12/18/2018, serum creatinine based estimated GFR (eGFR) will be   calculated using the Chronic Kidney Disease Epidemiology Collaboration   (CKD-EPI) equation.      GFR Estimate If Black 73 >60 mL/min/[1.73_m2]      Comment:       GFR Calc  Starting 12/18/2018, serum creatinine based estimated GFR (eGFR) will be   calculated using the Chronic Kidney Disease Epidemiology Collaboration   (CKD-EPI) equation.      Calcium 9.5 8.5 - 10.1 mg/dL    Bilirubin Total 1.0 0.2 - 1.3 mg/dL    Albumin 4.0 3.4 - 5.0 g/dL     Protein Total 8.0 6.8 - 8.8 g/dL    Alkaline Phosphatase 78 40 - 150 U/L    ALT 37 0 - 70 U/L    AST 13 0 - 45 U/L   Vitamin D Deficiency   Result Value Ref Range    Vitamin D Deficiency screening 18 (L) 20 - 75 ug/L      Comment:      Season, race, dietary intake, and treatment affect the concentration of   25-hydroxy-Vitamin D. Values may decrease during winter months and increase   during summer months. Values 20-29 ug/L may indicate Vitamin D insufficiency   and values <20 ug/L may indicate Vitamin D deficiency.  Vitamin D determination is routinely performed by an immunoassay specific for   25 hydroxyvitamin D3.  If an individual is on vitamin D2 (ergocalciferol)   supplementation, please specify 25 OH vitamin D2 and D3 level determination by   LCMSMS test [id number].     Lipid panel reflex to direct LDL Fasting   Result Value Ref Range    Cholesterol 220 (H) <200 mg/dL      Comment:      Desirable:       <200 mg/dl    Triglycerides 171 (H) <150 mg/dL      Comment:      Borderline high:  150-199 mg/dl  High:             200-499 mg/dl  Very high:       >499 mg/dl      HDL Cholesterol 35 (L) >39 mg/dL    LDL Cholesterol Calculated 151 (H) <100 mg/dL      Comment:      Above desirable:  100-129 mg/dl  Borderline High:  130-159 mg/dL  High:             160-189 mg/dL  Very high:       >189 mg/dl      Non HDL Cholesterol 185 (H) <130 mg/dL      Comment:      Above Desirable:  130-159 mg/dl  Borderline high:  160-189 mg/dl  High:             190-219 mg/dl  Very high:       >219 mg/dl     PSA, tumor marker   Result Value Ref Range    PSA 3.53 0 - 4 ug/L      Comment:      Assay Method:  Chemiluminescence using Siemens Vista analyzer       If you have any questions or concerns, please call the clinic at the number listed above. Plan to follow up in 3-6 months for recheck.      Sincerely,    Kirill Beal MD

## 2019-01-10 NOTE — PROGRESS NOTES
SUBJECTIVE:   Brian Flynn is a 63 year old male who presents to clinic today for the following health issues:      Hypertension Follow-up  Patient home blood pressure has been stable.  He is currently on 3 blood pressure medication including amlodipine 10 mg, 50 mg of metoprolol and combination of hydrochlorothiazide and lisinopril.  He denies any headaches blurry vision no chest pains no shortness of breath.  He has sedentary lifestyle however he is trying to improve it.    Outpatient blood pressures are not being checked.    Low Salt Diet: low salt      Amount of exercise or physical activity: 6 days per week    Problems taking medications regularly: No    Medication side effects: none    Diet: low salt            Problem list and histories reviewed & adjusted, as indicated.  Additional history: as documented    Patient Active Problem List   Diagnosis     Essential hypertension, benign     Redundant prepuce and phimosis     Borderline high cholesterol     CARDIOVASCULAR SCREENING; LDL GOAL LESS THAN 130     Morbid obesity (H)     Past Surgical History:   Procedure Laterality Date     CIRCUMCISION,CLAMP  11/07    for Severe phimosis with balanoposthitis       Social History     Tobacco Use     Smoking status: Never Smoker     Smokeless tobacco: Never Used   Substance Use Topics     Alcohol use: No     Family History   Problem Relation Age of Onset     Cancer Mother         ovarian     Hypertension Father      Cerebrovascular Disease Father         age 69     Asthma Father      Diabetes No family hx of      Breast Cancer No family hx of      Cancer - colorectal No family hx of      Prostate Cancer No family hx of          Current Outpatient Medications   Medication Sig Dispense Refill     amLODIPine (NORVASC) 10 MG tablet Take 1 tablet (10 mg) by mouth daily 90 tablet 1     lisinopril-hydrochlorothiazide (PRINZIDE/ZESTORETIC) 20-25 MG tablet Take 1 tablet by mouth daily 90 tablet 1     metoprolol succinate  ER (TOPROL-XL) 50 MG 24 hr tablet Take 1 tablet (50 mg) by mouth daily 90 tablet 1       Reviewed and updated as needed this visit by clinical staff  Tobacco  Allergies  Meds       Reviewed and updated as needed this visit by Provider         ROS:  CONSTITUTIONAL: NEGATIVE for fever, chills, change in weight  ENT/MOUTH: NEGATIVE for ear, mouth and throat problems  RESP: NEGATIVE for significant cough or SOB  CV: NEGATIVE for chest pain, palpitations or peripheral edema    OBJECTIVE:                                                    /84   Pulse 76   Temp 98.7  F (37.1  C) (Tympanic)   Wt 127.9 kg (282 lb)   BMI 40.46 kg/m    Body mass index is 40.46 kg/m .   GENERAL: healthy, alert, well nourished, well hydrated, no distress  NECK: no tenderness, no adenopathy, no asymmetry, no masses, no stiffness; thyroid- normal to palpation  RESP: lungs clear to auscultation - no rales, no rhonchi, no wheezes  CV: regular rates and rhythm, normal S1 S2, no S3 or S4 and no murmur, no click or rub -         ASSESSMENT/PLAN:                                                        ICD-10-CM    1. Essential hypertension, benign I10 amLODIPine (NORVASC) 10 MG tablet     lisinopril-hydrochlorothiazide (PRINZIDE/ZESTORETIC) 20-25 MG tablet     metoprolol succinate ER (TOPROL-XL) 50 MG 24 hr tablet   2. Morbid obesity (H) E66.01 Comprehensive metabolic panel   3. Uncontrolled hypertension I10 amLODIPine (NORVASC) 10 MG tablet     lisinopril-hydrochlorothiazide (PRINZIDE/ZESTORETIC) 20-25 MG tablet     metoprolol succinate ER (TOPROL-XL) 50 MG 24 hr tablet   4. CARDIOVASCULAR SCREENING; LDL GOAL LESS THAN 130 Z13.6 Comprehensive metabolic panel     Lipid panel reflex to direct LDL Fasting   5. Vitamin D deficiency E55.9 Vitamin D Deficiency     Lipid panel reflex to direct LDL Fasting   6. Screening for prostate cancer Z12.5 PSA, tumor marker       Patient initial blood pressure was elevated.  Recheck was better.  He is  advised to eat healthy low-salt diet regular exercise.  His medications are refilled.  He is advised to follow-up in 6 months.  Does have some anxiety which make his blood pressure slight go off when he comes to doctor's office however at home his blood pressure has been stable.  Side effects of the medication were discussed with the patient.    Kirill Beal MD  [facility]

## 2019-04-09 ENCOUNTER — ALLIED HEALTH/NURSE VISIT (OUTPATIENT)
Dept: NURSING | Facility: CLINIC | Age: 64
End: 2019-04-09
Payer: COMMERCIAL

## 2019-04-09 DIAGNOSIS — Z23 NEED FOR VACCINATION: Primary | ICD-10-CM

## 2019-04-09 DIAGNOSIS — Z23 ENCOUNTER FOR IMMUNIZATION: ICD-10-CM

## 2019-04-09 PROCEDURE — 99207 ZZC NO CHARGE NURSE ONLY: CPT

## 2019-04-09 PROCEDURE — 90750 HZV VACC RECOMBINANT IM: CPT

## 2019-04-09 PROCEDURE — 90471 IMMUNIZATION ADMIN: CPT

## 2019-04-09 NOTE — PROGRESS NOTES
Prior to injection, verified patient identity using patient's name and date of birth.  Due to injection administration, patient instructed to remain in clinic for 15 minutes  afterwards, and to report any adverse reaction to me immediately.    shingrix    Drug Amount Wasted:  None.  Vial/Syringe: Multi dose vial  Expiration Date:  10/10/21

## 2019-06-12 ENCOUNTER — OFFICE VISIT (OUTPATIENT)
Dept: FAMILY MEDICINE | Facility: CLINIC | Age: 64
End: 2019-06-12
Payer: COMMERCIAL

## 2019-06-12 VITALS
TEMPERATURE: 97.7 F | OXYGEN SATURATION: 97 % | SYSTOLIC BLOOD PRESSURE: 144 MMHG | DIASTOLIC BLOOD PRESSURE: 74 MMHG | WEIGHT: 283 LBS | BODY MASS INDEX: 40.61 KG/M2 | RESPIRATION RATE: 16 BRPM | HEART RATE: 72 BPM

## 2019-06-12 DIAGNOSIS — E66.01 MORBID OBESITY (H): Primary | ICD-10-CM

## 2019-06-12 DIAGNOSIS — I10 ESSENTIAL HYPERTENSION, BENIGN: ICD-10-CM

## 2019-06-12 PROCEDURE — 99214 OFFICE O/P EST MOD 30 MIN: CPT | Performed by: FAMILY MEDICINE

## 2019-06-12 RX ORDER — AMLODIPINE BESYLATE 10 MG/1
10 TABLET ORAL DAILY
Qty: 90 TABLET | Refills: 1 | Status: SHIPPED | OUTPATIENT
Start: 2019-06-12 | End: 2019-12-11

## 2019-06-12 RX ORDER — LISINOPRIL AND HYDROCHLOROTHIAZIDE 20; 25 MG/1; MG/1
1 TABLET ORAL DAILY
Qty: 90 TABLET | Refills: 1 | Status: SHIPPED | OUTPATIENT
Start: 2019-06-12 | End: 2019-12-11

## 2019-06-12 RX ORDER — METOPROLOL SUCCINATE 50 MG/1
50 TABLET, EXTENDED RELEASE ORAL DAILY
Qty: 90 TABLET | Refills: 1 | Status: SHIPPED | OUTPATIENT
Start: 2019-06-12 | End: 2019-12-11

## 2019-08-03 ENCOUNTER — APPOINTMENT (OUTPATIENT)
Dept: GENERAL RADIOLOGY | Facility: CLINIC | Age: 64
End: 2019-08-03
Attending: EMERGENCY MEDICINE
Payer: COMMERCIAL

## 2019-08-03 ENCOUNTER — HOSPITAL ENCOUNTER (EMERGENCY)
Facility: CLINIC | Age: 64
Discharge: HOME | End: 2019-08-03
Attending: EMERGENCY MEDICINE | Admitting: EMERGENCY MEDICINE
Payer: COMMERCIAL

## 2019-08-03 VITALS
RESPIRATION RATE: 16 BRPM | OXYGEN SATURATION: 93 % | HEART RATE: 92 BPM | HEIGHT: 69 IN | WEIGHT: 285 LBS | DIASTOLIC BLOOD PRESSURE: 74 MMHG | SYSTOLIC BLOOD PRESSURE: 159 MMHG | TEMPERATURE: 99.8 F | BODY MASS INDEX: 42.21 KG/M2

## 2019-08-03 DIAGNOSIS — I10 ESSENTIAL HYPERTENSION, BENIGN: ICD-10-CM

## 2019-08-03 DIAGNOSIS — Z13.6 CARDIOVASCULAR SCREENING; LDL GOAL LESS THAN 130: ICD-10-CM

## 2019-08-03 DIAGNOSIS — R73.9 HYPERGLYCEMIA: ICD-10-CM

## 2019-08-03 DIAGNOSIS — L02.419 CELLULITIS AND ABSCESS OF LEG: ICD-10-CM

## 2019-08-03 DIAGNOSIS — L03.119 CELLULITIS AND ABSCESS OF LEG: ICD-10-CM

## 2019-08-03 DIAGNOSIS — E66.01 MORBID OBESITY (H): ICD-10-CM

## 2019-08-03 LAB
ANION GAP SERPL CALCULATED.3IONS-SCNC: 5 MMOL/L (ref 3–14)
BASOPHILS # BLD AUTO: 0 10E9/L (ref 0–0.2)
BASOPHILS NFR BLD AUTO: 0.1 %
BUN SERPL-MCNC: 25 MG/DL (ref 7–30)
CALCIUM SERPL-MCNC: 8.8 MG/DL (ref 8.5–10.1)
CHLORIDE SERPL-SCNC: 108 MMOL/L (ref 94–109)
CO2 SERPL-SCNC: 28 MMOL/L (ref 20–32)
CREAT SERPL-MCNC: 1.23 MG/DL (ref 0.66–1.25)
DIFFERENTIAL METHOD BLD: ABNORMAL
EOSINOPHIL # BLD AUTO: 0.1 10E9/L (ref 0–0.7)
EOSINOPHIL NFR BLD AUTO: 0.3 %
ERYTHROCYTE [DISTWIDTH] IN BLOOD BY AUTOMATED COUNT: 13 % (ref 10–15)
GFR SERPL CREATININE-BSD FRML MDRD: 61 ML/MIN/{1.73_M2}
GLUCOSE SERPL-MCNC: 156 MG/DL (ref 70–99)
HBA1C MFR BLD: 6.3 % (ref 0–5.6)
HCT VFR BLD AUTO: 40.3 % (ref 40–53)
HGB BLD-MCNC: 14 G/DL (ref 13.3–17.7)
IMM GRANULOCYTES # BLD: 0 10E9/L (ref 0–0.4)
IMM GRANULOCYTES NFR BLD: 0.2 %
INTERPRETATION ECG - MUSE: NORMAL
LYMPHOCYTES # BLD AUTO: 1.2 10E9/L (ref 0.8–5.3)
LYMPHOCYTES NFR BLD AUTO: 8.1 %
MCH RBC QN AUTO: 30.8 PG (ref 26.5–33)
MCHC RBC AUTO-ENTMCNC: 34.7 G/DL (ref 31.5–36.5)
MCV RBC AUTO: 89 FL (ref 78–100)
MONOCYTES # BLD AUTO: 1 10E9/L (ref 0–1.3)
MONOCYTES NFR BLD AUTO: 6.7 %
NEUTROPHILS # BLD AUTO: 12.3 10E9/L (ref 1.6–8.3)
NEUTROPHILS NFR BLD AUTO: 84.6 %
NRBC # BLD AUTO: 0 10*3/UL
NRBC BLD AUTO-RTO: 0 /100
NT-PROBNP SERPL-MCNC: 213 PG/ML (ref 0–900)
PLATELET # BLD AUTO: 177 10E9/L (ref 150–450)
POTASSIUM SERPL-SCNC: 3.8 MMOL/L (ref 3.4–5.3)
RBC # BLD AUTO: 4.55 10E12/L (ref 4.4–5.9)
SODIUM SERPL-SCNC: 141 MMOL/L (ref 133–144)
TROPONIN I SERPL-MCNC: <0.015 UG/L (ref 0–0.04)
WBC # BLD AUTO: 14.5 10E9/L (ref 4–11)

## 2019-08-03 PROCEDURE — 99285 EMERGENCY DEPT VISIT HI MDM: CPT | Mod: 25

## 2019-08-03 PROCEDURE — 85025 COMPLETE CBC W/AUTO DIFF WBC: CPT | Performed by: EMERGENCY MEDICINE

## 2019-08-03 PROCEDURE — 76882 US LMTD JT/FCL EVL NVASC XTR: CPT

## 2019-08-03 PROCEDURE — 71046 X-RAY EXAM CHEST 2 VIEWS: CPT

## 2019-08-03 PROCEDURE — 84484 ASSAY OF TROPONIN QUANT: CPT | Performed by: EMERGENCY MEDICINE

## 2019-08-03 PROCEDURE — 83880 ASSAY OF NATRIURETIC PEPTIDE: CPT | Performed by: EMERGENCY MEDICINE

## 2019-08-03 PROCEDURE — 80048 BASIC METABOLIC PNL TOTAL CA: CPT | Performed by: EMERGENCY MEDICINE

## 2019-08-03 PROCEDURE — 93005 ELECTROCARDIOGRAM TRACING: CPT | Mod: 59

## 2019-08-03 PROCEDURE — 83036 HEMOGLOBIN GLYCOSYLATED A1C: CPT | Performed by: EMERGENCY MEDICINE

## 2019-08-03 PROCEDURE — 73590 X-RAY EXAM OF LOWER LEG: CPT | Mod: LT

## 2019-08-03 PROCEDURE — 25000132 ZZH RX MED GY IP 250 OP 250 PS 637: Performed by: EMERGENCY MEDICINE

## 2019-08-03 PROCEDURE — 10060 I&D ABSCESS SIMPLE/SINGLE: CPT

## 2019-08-03 RX ORDER — CEPHALEXIN 500 MG/1
1000 CAPSULE ORAL 2 TIMES DAILY
Qty: 40 CAPSULE | Refills: 0 | Status: SHIPPED | OUTPATIENT
Start: 2019-08-03 | End: 2019-08-05

## 2019-08-03 RX ORDER — LIDOCAINE 40 MG/G
CREAM TOPICAL
Status: DISCONTINUED | OUTPATIENT
Start: 2019-08-03 | End: 2019-08-03 | Stop reason: HOSPADM

## 2019-08-03 RX ORDER — CEPHALEXIN 500 MG/1
1000 CAPSULE ORAL ONCE
Status: COMPLETED | OUTPATIENT
Start: 2019-08-03 | End: 2019-08-03

## 2019-08-03 RX ORDER — SULFAMETHOXAZOLE/TRIMETHOPRIM 800-160 MG
1 TABLET ORAL 2 TIMES DAILY
Status: DISCONTINUED | OUTPATIENT
Start: 2019-08-03 | End: 2019-08-03 | Stop reason: HOSPADM

## 2019-08-03 RX ORDER — SULFAMETHOXAZOLE/TRIMETHOPRIM 800-160 MG
1 TABLET ORAL 2 TIMES DAILY
Qty: 20 TABLET | Refills: 0 | Status: SHIPPED | OUTPATIENT
Start: 2019-08-03 | End: 2019-08-05

## 2019-08-03 RX ADMIN — SULFAMETHOXAZOLE AND TRIMETHOPRIM 1 TABLET: 800; 160 TABLET ORAL at 08:26

## 2019-08-03 RX ADMIN — CEPHALEXIN 1000 MG: 500 CAPSULE ORAL at 08:25

## 2019-08-03 ASSESSMENT — ENCOUNTER SYMPTOMS
SHORTNESS OF BREATH: 1
WOUND: 1
CHILLS: 1
APPETITE CHANGE: 0
FEVER: 1

## 2019-08-03 ASSESSMENT — MIFFLIN-ST. JEOR: SCORE: 2068.13

## 2019-08-03 NOTE — DISCHARGE INSTRUCTIONS
Discharge Instructions  Cellulitis    Cellulitis is an infection of the skin that occurs when bacteria enter the skin.   Symptoms are generally redness, swelling, warmth and pain.  Your infection appeared to be appropriate to treat at home with antibiotics.  However, sometimes your infection may be worse than it seemed at first, or may worsen with time. If you have new or worse symptoms, you may need to be seen again in the Emergency Department or by your primary provider.    Generally, every Emergency Department visit should have a follow-up clinic visit with either a primary or a specialty clinic/provider. Please follow-up as instructed by your emergency provider today.    Return to the Emergency Department if:  The redness, pain, or swelling gets a lot worse.  If the red area was marked, return if it is red significantly beyond the marked area.  You are unable to get your antibiotics, or are vomiting (throwing up) these pills, or you cannot take them.  You are feeling more ill, weak or lightheaded.  You start to run a new fever (temperature >101 F).  Anything else about the infection worries or concerns you.  Treatment:  Start your antibiotics right away, and take them as prescribed. Be sure to finish the whole prescription, even if you are better.  Apply a heating pad, warm packs, or warm water soaks to the infected area for 15 minutes at a time, at least 3 times a day. Do not use a heating pad on your feet or legs if you have diabetes. Do not sleep with a heating pad on, since this can cause burns or skin injury.  Rest your injured area for at least 1-2 days. After that you may start using your extremity again as long as there is not too much pain.   Raise the injured area above the level of your heart as much as possible in the first 1-2 days.  Tylenol  (acetaminophen), Motrin  (ibuprofen), or Advil  (ibuprofen) may help may help reduce pain and fever and may help you feel more comfortable. Be sure to read and  follow the package directions, and ask your provider if you have questions.    If you were given a prescription for medicine here today, be sure to read all of the information (including the package insert) that comes with your prescription.  This will include important information about the medicine, its side effects, and any warnings that you need to know about.  The pharmacist who fills the prescription can provide more information and answer questions you may have about the medicine.  If you have questions or concerns that the pharmacist cannot address, please call or return to the Emergency Department.     Remember that you can always come back to the Emergency Department if you are not able to see your regular provider in the amount of time listed above, if you get any new symptoms, or if there is anything that worries you.    DO NOT TAKE YOUR BETA-BLOCKER MEDICINE (METOPROLOL) FOR 24 HOURS BEFORE YOUR STRESS TEST.

## 2019-08-03 NOTE — ED PROVIDER NOTES
History     Chief Complaint:  Wound Check    The history is provided by the patient.      Brian Flynn is a 64 year old male with a history of hypertension and borderline high cholesterol who presents with wound check. The patient states he first noticed a wound on his anterior left lower leg yesterday. The patient notes the wound is painful with watery discharge. He did note subjective fever and chills last night. The patient denies any injury to his leg though he notes he did use compression socks which caused a blister in the area on 07/30. The patient denies a history of diabetes. The patient does note that his legs have been swelling more often recently with shortness of breath with exertion.  He also has had mild substernal chest pain twinges with exercise over the last several weeks. He notes his last episode of chest pain and shortness of breath was about a week ago while exerting himself. He notes this discomfort goes away when he rests. The patient denies loss of appetite or muscle aches.     CARDIAC RISK FACTORS:  Tobacco:   no  Hypertension:   Yes  Hyperlipidemia:  Yes, borderline high cholesterol   Diabetes:   no    Allergies:  Fluticasone Propionate      Medications:    Amlodipine  Lisinopril-hydrochlorothiazide   Metoprolol succinate ER    Past Medical History:    Hypertension  Blind right eye  Obesity   Borderline high cholesterol  Redundant prepuce and phimosis     Past Surgical History:    Circumcision, clamp    Family History:    Mother: ovarian cancer  Father: hypertension, cerebrovascular disease, asthma     Social History:  The patient was accompanied to the ED by wife.  Smoking Status: Never Smoker  Smokeless Tobacco: Never Used  Alcohol Use: No  Drug Use: No  PCP: Kirill Beal  Marital Status:        Review of Systems   Constitutional: Positive for chills and fever. Negative for appetite change.   Respiratory: Positive for shortness of breath.    Cardiovascular: Positive for  "chest pain and leg swelling.   Skin: Positive for wound.   All other systems reviewed and are negative.    Physical Exam     Patient Vitals for the past 24 hrs:   BP Temp Temp src Pulse Resp SpO2 Height Weight   08/03/19 0830 (!) 159/74 -- -- 92 16 93 % -- --   08/03/19 0711 (!) 155/83 -- -- 88 -- -- -- --   08/03/19 0648 -- 99.8  F (37.7  C) Oral 89 20 96 % 1.753 m (5' 9\") 129.3 kg (285 lb)     Physical Exam  Gen: Pleasant, appears stated age.    Eye:   Pupils are equal, round, and reactive.     Sclera non-injected.    ENT:   Moist mucus membranes.     Normal tongue.    Oropharynx without lesions.    Cardiac:     Normal rate and regular rhythm.    No murmurs, gallops, or rubs.    Pulmonary:     Clear to auscultation bilaterally.    No wheezes, rales, or rhonchi.    Abdomen:     Normal active bowel sounds.     Abdomen is soft and non-distended, without focal tenderness.    Musculoskeletal:     Normal movement of all extremities without evidence for deficit.    Extremity Exam: Full AROM of all joints without pain except the following:  LLE  Inspection:  Pitting edema to mid shin in BLE    2x2cm ulceration at distal tibia with fluctuant fluid.     3 cm of surrounding erythema.   Palpation: warm over area of redness but nontender with no crepitus.   ROM: normal without pain in knee and ankle  Distal Pulses: 2+ DP, CR < 2 seconds     Neurologic:    Non-focal exam without asymmetric weakness or numbness.    Normal tone    Psychiatric:     Normal affect with appropriate interaction with examiner.  Emergency Department Course   ECG:  ECG taken at 0725, ECG read at 0731  Normal sinus rhythm  St and T wave abnormality, consider inferior ischemia  ST and T wave abnormality, consider anterolateral ischemia  Abnormal ECG  No significant change when compared to EKG dated 03/14/18.  Rate 87 bpm. CA interval 136 ms. QRS duration 98 ms. QT/QTc 378/454 ms. P-R-T axes 32 31 184.    Imaging:  Radiology findings were communicated with " the patient who voiced understanding of the findings.    XR Tibia & Fibula Left 2 Views  1. Swelling/subcutaneous edema throughout the left lower leg. No soft  tissue gas. No radiopaque foreign body.  DEBRA STALLWORTH MD    XR Chest 2 Views  Clear lungs.   Reading per radiology     Laboratory:  Laboratory findings were communicated with the patient who voiced understanding of the findings.    CBC: WBC 14.2(H), HGB 14.0,   BMP: glucose 156(H) o/w WNL (Creatinine 1.23)  BNP: 213  Troponin (Collected 0708): <0.015  Hemoglobin A1C: 6.3(H)    Interventions:   0852 Keflex 1000 mg Oral  0826 Bactrim DS 1 tablet Oral    Procedure :    Incision and Drainage     LOCATIONS:  Mid shin on left lower extremity      ANESTHESIA:  Local field block using Marcaine 0.5% with epinephrine, total of 3 mLs     PREPARATION:  Cleansed with Betadine     PROCEDURE:  Area was incised with # 11 Blade (Sharp [place]) with a Single Straight incision.  Wound treatment included Purulent Drainage from the ulceration.  Packing consisted of No Packing.  Appropriate dressing was applied to cover the area.    PATIENT STATUS:   Patient tolerated the procedure well. There were no complications.      Emergency Department Course:  Nursing notes and vitals reviewed.    0700 I performed an exam of the patient as documented above.     0708 IV was inserted and blood was drawn for laboratory testing, results above.    0711 The patient was sent for a XR Chest and XR Tibia/Fibula Left while in the emergency department, results above.     0740 I returned to update the patient.    I preformed a bedside ultrasound on the patient    0748 I preformed an incision and drainage on the patient as noted above.     0813 I returned to check on the patient.     0825 Findings and plan explained to the patient. Patient discharged home with instructions regarding supportive care, medications, and reasons to return. The importance of close follow-up was reviewed. The  patient was prescribed Keflex and Bactrim    Impression & Plan    Medical Decision Making:  Brian Flynn is a 64 year old male with history of hypertension, obesity, and elevated lipids who presents today with lower extremity redness and swelling.  On exam, he is afebrile though he endorses chills last night. The area is consistent with abscess and surrounding infection. Otherwise, the patient does not appear septic at this time. He does have a mild leukocytosis. Labs are notable for mild hyperglycemia as well. Imaging of the lower extremity does not demonstrate any gas forming organisms or signs of osteomyelitis. Ultrasound shows cobble stoning and subcutaneous abscess, however, vs infected blister. This was incised and drained at the bedside. A nonstick dressing was applied after circling the area. The patient was started oral antibiotics in the ED to cover step and staph organisms. At this point, from the standpoint of his cellulitis, he is appropriate for outpatient treatment.   The patient also mentioned that he has had worsening lower extremity edema for the last several weeks or months. This could be related to Norvasc though he has complained of dyspnea on exertion as well. This raises the possibility of mild heart failure. There is no evidence of CHF exacerbation or pulmonary edema at this time. However, given longstanding hypertension, I think deserves further evaluation as an outpatient .  Lastly, the patient noted he has been having mild chest discomfort that seems worse with exertion and better with rest. His last episode was about a week ago. He has never had a stress test, and appears to be at risk for metabolic syndrome. I have ordered an outpatient stress test to further evaluate this exertional discomfort. Otherwise, I have encouraged close follow up with primary care physician in regards to his hyperglycemia, leg swelling, and dyspnea on exertion as well as to follow up with results of stress  test. The patient will return to the ED immediately for worsening redness, fever, chest pain, or for any other concerns.     Diagnosis:    ICD-10-CM    1. Cellulitis and abscess of leg L03.119 Hemoglobin A1c    L02.419 Hemoglobin A1c     CANCELED: Hemoglobin A1c   2. Hyperglycemia R73.9    3. Morbid obesity (H) E66.01 NM Lexiscan stress test   4. CARDIOVASCULAR SCREENING; LDL GOAL LESS THAN 130 Z13.6 NM Lexiscan stress test   5. Essential hypertension, benign I10 NM Lexiscan stress test        Disposition:   The patient is discharged to home.    Discharge Medications:     START taking      Dose / Directions   cephALEXin 500 MG capsule  Commonly known as:  KEFLEX      Dose:  1000 mg  Take 2 capsules (1,000 mg) by mouth 2 times daily for 10 days  Quantity:  40 capsule  Refills:  0     sulfamethoxazole-trimethoprim 800-160 MG tablet  Commonly known as:  BACTRIM DS      Dose:  1 tablet  Take 1 tablet by mouth 2 times daily for 10 days  Quantity:  20 tablet  Refills:  0           Where to get your medicines      These medications were sent to [id number] - [place] [address] AT [facility] [address]  [address], [facility] [address]    Phone:  [phone]     cephALEXin 500 MG capsule    sulfamethoxazole-trimethoprim 800-160 MG tablet         Scribe Disclosure:  SAHRA Marisasalo Johnston, am serving as a scribe at 7:06 AM on 8/3/2019 to document services personally performed by Ariella Buenrostro MD based on my observations and the provider's statements to me.   EMERGENCY DEPARTMENT       Ariella Buenrostro MD  08/03/19 [id number]

## 2019-08-03 NOTE — ED AVS SNAPSHOT
Emergency Department  [address] [place] [address]  Phone:  [phone]  Fax:  [phone]                                    Brian Flynn   MRN: [id number]    Department:   Emergency Department   Date of Visit:  8/3/2019           After Visit Summary Signature Page    I have received my discharge instructions, and my questions have been answered. I have discussed any challenges I see with this plan with the nurse or doctor.    ..........................................................................................................................................  Patient/Patient Representative Signature      ..........................................................................................................................................  Patient Representative Print Name and Relationship to Patient    ..................................................               ................................................  Date                                   Time    ..........................................................................................................................................  Reviewed by Signature/Title    ...................................................              ..............................................  Date                                               Time          22EPIC Rev 08/18

## 2019-08-05 ENCOUNTER — OFFICE VISIT (OUTPATIENT)
Dept: FAMILY MEDICINE | Facility: CLINIC | Age: 64
End: 2019-08-05
Payer: COMMERCIAL

## 2019-08-05 VITALS
BODY MASS INDEX: 41.62 KG/M2 | WEIGHT: 281 LBS | TEMPERATURE: 98.8 F | HEART RATE: 113 BPM | DIASTOLIC BLOOD PRESSURE: 80 MMHG | SYSTOLIC BLOOD PRESSURE: 128 MMHG | HEIGHT: 69 IN | OXYGEN SATURATION: 97 %

## 2019-08-05 DIAGNOSIS — L02.419 CELLULITIS AND ABSCESS OF LEG: ICD-10-CM

## 2019-08-05 DIAGNOSIS — R73.03 PREDIABETES: Primary | ICD-10-CM

## 2019-08-05 DIAGNOSIS — R07.89 CHEST PRESSURE: ICD-10-CM

## 2019-08-05 DIAGNOSIS — L03.119 CELLULITIS AND ABSCESS OF LEG: ICD-10-CM

## 2019-08-05 PROCEDURE — 99214 OFFICE O/P EST MOD 30 MIN: CPT | Performed by: FAMILY MEDICINE

## 2019-08-05 RX ORDER — CEPHALEXIN 500 MG/1
1000 CAPSULE ORAL 2 TIMES DAILY
Qty: 40 CAPSULE | Refills: 0
Start: 2019-08-05 | End: 2019-12-11

## 2019-08-05 RX ORDER — SULFAMETHOXAZOLE/TRIMETHOPRIM 800-160 MG
1 TABLET ORAL 2 TIMES DAILY
Qty: 20 TABLET | Refills: 0
Start: 2019-08-05 | End: 2019-12-11

## 2019-08-05 ASSESSMENT — MIFFLIN-ST. JEOR: SCORE: 2049.99

## 2019-08-05 NOTE — PROGRESS NOTES
Subjective     Brian Flynn is a 64 year old male who presents to clinic today for the following health issues:    HPI   ED/UC Followup:    Facility:  [facility] ED   Date of visit: 8/3/2019   Reason for visit: WOUND CHECK   Current Status: thinks its getting better        PROBLEMS TO ADD ON...  Glucose was elevated at er. a1c was in prediabtes range and it has been  like that previously as well. He could do better with exercise and diet, although wife is diabetic so soemtimes he does check fasting sugar and it is mostly ok    pre Diabetes Follow-up      How often are you checking your blood sugar? Not at all    What time of day are you checking your blood sugars (select all that apply)?  Na     Have you had any blood sugars above 200?  No    Have you had any blood sugars below 70?  No    What symptoms do you notice when your blood sugar is low?  None    What concerns do you have today about your diabetes? None     Do you have any of these symptoms? (Select all that apply)  No numbness or tingling in feet.  No redness, sores or blisters on feet.  No complaints of excessive thirst.  No reports of blurry vision.  No significant changes to weight.     Have you had a diabetic eye exam in the last 12 months? Yes- Date of last eye exam: 0    BP Readings from Last 2 Encounters:   08/05/19 (!) 142/78   08/03/19 (!) 159/74     Hemoglobin A1C (%)   Date Value   08/03/2019 6.3 (H)     LDL Cholesterol Calculated (mg/dL)   Date Value   01/10/2019 151 (H)   07/12/2018 125 (H)       Diabetes Management Resources    Patient Active Problem List   Diagnosis     Essential hypertension, benign     Redundant prepuce and phimosis     Borderline high cholesterol     CARDIOVASCULAR SCREENING; LDL GOAL LESS THAN 130     Morbid obesity (H)     Prediabetes     Past Surgical History:   Procedure Laterality Date     CIRCUMCISION,CLAMP  11/07    for Severe phimosis with balanoposthitis       Social History     Tobacco Use     Smoking status:  Never Smoker     Smokeless tobacco: Never Used   Substance Use Topics     Alcohol use: No     Family History   Problem Relation Age of Onset     Cancer Mother         ovarian     Hypertension Father      Cerebrovascular Disease Father         age 69     Asthma Father      Diabetes No family hx of      Breast Cancer No family hx of      Cancer - colorectal No family hx of      Prostate Cancer No family hx of            Reviewed and updated as needed this visit by Provider         Review of Systems   ROS COMP: Constitutional, HEENT, cardiovascular, pulmonary, GI, , musculoskeletal, neuro, skin, endocrine and psych systems are negative, except as otherwise noted.      Objective    There were no vitals taken for this visit.  There is no height or weight on file to calculate BMI.  Physical Exam   GENERAL: healthy, alert and no distress  RESP: lungs clear to auscultation - no rales, rhonchi or wheezes  CV: regular rate and rhythm, normal S1 S2, no S3 or S4, no murmur,  ABDOMEN: soft, nontender, no hepatosplenomegaly, no masses and bowel sounds normal  MS: extremities normal- left leg wound is looking dry and cellulitis is improving , still has some erythema , ave  leg edema both legs ,   NEURO: Normal strength and tone, mentation intact and speech normal            Assessment & Plan       (L03.119,  L02.419) Cellulitis and abscess of leg  Comment: left leg   Plan: sulfamethoxazole-trimethoprim (BACTRIM DS)         800-160 MG tablet, cephALEXin (KEFLEX) 500 MG         capsule        Improving. Continue with med's. Skin cares and symptomatic treatment discussed follow up if problem         (R07.89) Chest pressure  Comment: was eveluated eat ER.  Improved,  Plan:  he is schduled for stress test     (R73.03) Prediabetes  (primary encounter diagnosis)  Comment:   Plan: Healthy diet and exercise reviewed.  Limit pop and juice intake.  Risks of DM discussed.  Encourage exercise.       Check labs. refill sent.Cares and   "treatment discussed follow. up if problem   Patient expressed understanding and agreement with treatment plan. All patient's questions were answered, will let me know if has more later.  Medications: Rx's: Reviewed the potential side effects/complications of medications prescribed.       BMI:   Estimated body mass index is 41.5 kg/m  as calculated from the following:    Height as of this encounter: 1.753 m (5' 9\").    Weight as of this encounter: 127.5 kg (281 lb).   Weight management plan: Discussed healthy diet and exercise guidelines        Return in about 1 week (around 8/12/2019), or sooner if needed .    Leyda Recinos MD  [facility]    "

## 2019-08-06 NOTE — PROGRESS NOTES
Subjective     Brian Flynn is a 64 year old male who presents to clinic today for the following health issues:    HPI   Hypertension Follow-up  As per patient his blood pressure at home is always normal.  He got little nervous when he comes to the office.  He is currently on 3 blood pressure medication.  Denies any side effects.    Do you check your blood pressure regularly outside of the clinic? Yes     Are you following a low salt diet? Lower salt    Are your blood pressures ever more than 140 on the top number (systolic) OR more   than 90 on the bottom number (diastolic), for example 140/90? Yes    Amount of exercise or physical activity: none in the past 3 months    Problems taking medications regularly: No    Medication side effects: none    Diet: low salt            Reviewed and updated as needed this visit by Provider         Review of Systems   ROS COMP: Constitutional, HEENT, cardiovascular, pulmonary, gi and gu systems are negative, except as otherwise noted.      Objective    /70   Pulse 72   Temp 97.7  F (36.5  C) (Tympanic)   Resp 16   Wt 128.4 kg (283 lb)   SpO2 97%   BMI 40.61 kg/m    Body mass index is 40.61 kg/m .  Physical Exam   GENERAL: healthy, alert and no distress  NECK: no adenopathy, no asymmetry, masses, or scars and thyroid normal to palpation  RESP: lungs clear to auscultation - no rales, rhonchi or wheezes  CV: regular rate and rhythm, normal S1 S2, no S3 or S4, no murmur, click or rub, no peripheral edema and peripheral pulses strong  ABDOMEN: soft, nontender, no hepatosplenomegaly, no masses and bowel sounds normal  MS: no gross musculoskeletal defects noted, no edema    Diagnostic Test Results:  Labs reviewed in Epic        Assessment & Plan     1. Essential hypertension, benign  Pressure was elevated initially recheck was slight better.  As per patient his blood pressure at home is always good.  He denies any chest pains no shortness of breath.  I suggested patient  to follow-up in 6 months for recheck.  Work on your diet and do regular exercise.  - amLODIPine (NORVASC) 10 MG tablet; Take 1 tablet (10 mg) by mouth daily  Dispense: 90 tablet; Refill: 1  - lisinopril-hydrochlorothiazide (PRINZIDE/ZESTORETIC) 20-25 MG tablet; Take 1 tablet by mouth daily  Dispense: 90 tablet; Refill: 1  - metoprolol succinate ER (TOPROL-XL) 50 MG 24 hr tablet; Take 1 tablet (50 mg) by mouth daily  Dispense: 90 tablet; Refill: 1    2. Morbid obesity (H)               No follow-ups on file.    Kirill eBal MD  [facility]       Statement Selected

## 2019-08-08 ENCOUNTER — HOSPITAL ENCOUNTER (OUTPATIENT)
Dept: CARDIOLOGY | Facility: CLINIC | Age: 64
Discharge: HOME | End: 2019-08-08
Attending: EMERGENCY MEDICINE | Admitting: EMERGENCY MEDICINE
Payer: COMMERCIAL

## 2019-08-08 DIAGNOSIS — I10 ESSENTIAL HYPERTENSION, BENIGN: ICD-10-CM

## 2019-08-08 DIAGNOSIS — E66.01 MORBID OBESITY (H): ICD-10-CM

## 2019-08-08 DIAGNOSIS — Z13.6 CARDIOVASCULAR SCREENING; LDL GOAL LESS THAN 130: ICD-10-CM

## 2019-08-08 PROCEDURE — 93018 CV STRESS TEST I&R ONLY: CPT | Performed by: INTERNAL MEDICINE

## 2019-08-08 PROCEDURE — 25000128 H RX IP 250 OP 636: Performed by: INTERNAL MEDICINE

## 2019-08-08 PROCEDURE — A9502 TC99M TETROFOSMIN: HCPCS | Performed by: EMERGENCY MEDICINE

## 2019-08-08 PROCEDURE — 34300033 ZZH RX 343: Performed by: EMERGENCY MEDICINE

## 2019-08-08 PROCEDURE — 78452 HT MUSCLE IMAGE SPECT MULT: CPT | Mod: 26 | Performed by: INTERNAL MEDICINE

## 2019-08-08 PROCEDURE — 93017 CV STRESS TEST TRACING ONLY: CPT

## 2019-08-08 PROCEDURE — 93016 CV STRESS TEST SUPVJ ONLY: CPT | Performed by: INTERNAL MEDICINE

## 2019-08-08 RX ORDER — REGADENOSON 0.08 MG/ML
0.4 INJECTION, SOLUTION INTRAVENOUS ONCE
Status: COMPLETED | OUTPATIENT
Start: 2019-08-08 | End: 2019-08-08

## 2019-08-08 RX ORDER — ACYCLOVIR 200 MG/1
0-1 CAPSULE ORAL
Status: DISCONTINUED | OUTPATIENT
Start: 2019-08-08 | End: 2019-08-09 | Stop reason: HOSPADM

## 2019-08-08 RX ORDER — AMINOPHYLLINE 25 MG/ML
50-100 INJECTION, SOLUTION INTRAVENOUS
Status: DISCONTINUED | OUTPATIENT
Start: 2019-08-08 | End: 2019-08-09 | Stop reason: HOSPADM

## 2019-08-08 RX ORDER — ALBUTEROL SULFATE 90 UG/1
2 AEROSOL, METERED RESPIRATORY (INHALATION) EVERY 5 MIN PRN
Status: DISCONTINUED | OUTPATIENT
Start: 2019-08-08 | End: 2019-08-09 | Stop reason: HOSPADM

## 2019-08-08 RX ADMIN — REGADENOSON 0.4 MG: 0.08 INJECTION, SOLUTION INTRAVENOUS at 13:30

## 2019-08-08 RX ADMIN — TETROFOSMIN 10.28 MCI.: 1.38 INJECTION, POWDER, LYOPHILIZED, FOR SOLUTION INTRAVENOUS at 12:00

## 2019-08-08 RX ADMIN — TETROFOSMIN 31.3 MCI.: 1.38 INJECTION, POWDER, LYOPHILIZED, FOR SOLUTION INTRAVENOUS at 13:32

## 2019-08-11 ENCOUNTER — TELEPHONE (OUTPATIENT)
Facility: CLINIC | Age: 64
End: 2019-08-11

## 2019-08-11 DIAGNOSIS — R94.39 ABNORMAL CARDIOVASCULAR STRESS TEST: ICD-10-CM

## 2019-08-11 DIAGNOSIS — I20.89 STABLE ANGINA (H): Primary | ICD-10-CM

## 2019-08-11 NOTE — ED NOTES
6:36 PM  Talk to the patient on the phone.  Discussed the results of his stress test on 8/8/2019.  He had not been informed of those results yet.  We discussed that the stress test did show evidence of inducible ischemia.  The patient's symptoms have been stable, namely he continues to have mild chest discomfort with heavier exertion that resolves with rest.  He does not get rest discomfort or other symptoms.  He has follow-up scheduled with his PCP in 2 days.  We discussed that he is to return to the ED immediately if he has any change in the pattern of his chest discomfort namely less exertion that causes discomfort, any rest pain, or more severe pain.  Patient discussed briefly with Dr. Kirk of cardiology, who reviewed stress test results.  Given clinical picture of stable angina, he agrees with the plan.  Please see his note for details.      Ariella Buenrostro MD  08/12/19 5720

## 2019-08-12 NOTE — TELEPHONE ENCOUNTER
Date & Time: 8/26/2024, 7:03 PM  Patient: Bebe Tucker  Encounter Provider(s):    Mohini Wilkins APRN       To Whom It May Concern:    Bebe Tucker was seen and treated in our department on 8/26/2024. She can return to school with these limitations: No gym until next week .    If you have any questions or concerns, please do not hesitate to call.        _____________________________  Physician/APC Signature            CV Miscellaneous note:    I received a phone call this evening from Dr. Buenrostro regarding Mr. Flynn. He presented to the ED on 8/3/2019 for an acute wound. He mentioned some episodes of shortness of breath an occasional chest pain with exertion for several weeks. Initial troponin negative. EKG showed inverted inferolateral T waves. He was dismissed with outpatient follow up as he had no acute chest pain.     He underwent nuclear stress test on 8/8 which demonstrated an EF of 70% and a small reversible distal lateral wall defect consistent with a small degree of ischemia. Dr. Buenrostro called him and he was doing well. No resting chest pain. Continued occasional episodes of exertional chest pain. He has planned outpatient follow up later this week. I was called regarding stress test results.     It sounds as if he has stable angina with no rest pain. I advised avoidance of exertion that brings on his pain until follow up this week and referral to cardiology for probable cardiac catheterization. I advised that is he develops pain at rest or if his chest pains do not resolve to present to the emergency department. I am placing an order for urgent CV evaluation in the next 1-2 weeks for potential cath.     Joseluis Kirk MD on 8/11/2019 at 7:39 PM

## 2019-08-13 ENCOUNTER — OFFICE VISIT (OUTPATIENT)
Dept: FAMILY MEDICINE | Facility: CLINIC | Age: 64
End: 2019-08-13
Payer: COMMERCIAL

## 2019-08-13 VITALS
HEART RATE: 75 BPM | TEMPERATURE: 98.1 F | OXYGEN SATURATION: 97 % | DIASTOLIC BLOOD PRESSURE: 70 MMHG | BODY MASS INDEX: 41.05 KG/M2 | WEIGHT: 278 LBS | SYSTOLIC BLOOD PRESSURE: 140 MMHG

## 2019-08-13 DIAGNOSIS — L02.419 CELLULITIS AND ABSCESS OF LEG: ICD-10-CM

## 2019-08-13 DIAGNOSIS — L03.119 CELLULITIS AND ABSCESS OF LEG: ICD-10-CM

## 2019-08-13 DIAGNOSIS — I20.89 STABLE ANGINA (H): ICD-10-CM

## 2019-08-13 DIAGNOSIS — I10 ESSENTIAL HYPERTENSION, BENIGN: Primary | ICD-10-CM

## 2019-08-13 DIAGNOSIS — E78.5 HYPERLIPIDEMIA LDL GOAL <130: ICD-10-CM

## 2019-08-13 DIAGNOSIS — R94.39 ABNORMAL STRESS TEST: ICD-10-CM

## 2019-08-13 PROCEDURE — 99214 OFFICE O/P EST MOD 30 MIN: CPT | Performed by: FAMILY MEDICINE

## 2019-08-13 RX ORDER — ATORVASTATIN CALCIUM 20 MG/1
20 TABLET, FILM COATED ORAL DAILY
Qty: 90 TABLET | Refills: 3 | Status: SHIPPED | OUTPATIENT
Start: 2019-08-13 | End: 2020-07-30

## 2019-08-13 NOTE — PROGRESS NOTES
Subjective     Brian Flynn is a 64 year old male who presents to clinic today for the following health issues:    HPI   ED/UC Followup:    Facility:  [facility]  Date of visit: 8/3/19  Reason for visit: abscess on lower left leg - also had stress test done and would like to discuss results today      Patient finished his antibiotic Keflex and Bactrim.  As part of work-up stress test was done which was mildly positive for some reversible changes.  He does have comorbidities including obesity, high blood pressure and prediabetic.  Currently denies any chest pains no shortness of breath , however do get fatigued.    No fever, chills, have cellulites lower leg , which is healing. No worsening of redness.              Reviewed and updated as needed this visit by Provider         Review of Systems   ROS COMP: Constitutional, HEENT, cardiovascular, pulmonary, gi and gu systems are negative, except as otherwise noted.      Objective    BP (!) 166/60   Pulse 75   Temp 98.1  F (36.7  C) (Tympanic)   Wt 126.1 kg (278 lb)   SpO2 97%   BMI 41.05 kg/m    Body mass index is 41.05 kg/m .  Physical Exam   GENERAL: healthy, alert and no distress  NECK: no adenopathy, no asymmetry, masses, or scars and thyroid normal to palpation  RESP: lungs clear to auscultation - no rales, rhonchi or wheezes  CV: regular rate and rhythm, normal S1 S2, no S3 or S4, no murmur, click or rub, no peripheral edema and peripheral pulses strong  ABDOMEN: soft, nontender, no hepatosplenomegaly, no masses and bowel sounds normal  Wound inspected , no surrounding redness, granulation tissue seen.            Assessment & Plan     1. Essential hypertension, benign    - atorvastatin (LIPITOR) 20 MG tablet; Take 1 tablet (20 mg) by mouth daily  Dispense: 90 tablet; Refill: 3    2. Hyperlipidemia LDL goal <130  I will start patient on Lipitor as well along with other medication.  Please send to cardiology for evaluation for abnormal stress test.- atorvastatin  "(LIPITOR) 20 MG tablet; Take 1 tablet (20 mg) by mouth daily  Dispense: 90 tablet; Refill: 3    3. Abnormal stress test    - CARDIOLOGY EVAL ADULT REFERRAL    4. Stable angina (H)    5- cellulites  Currently on antibiotic recently finishing it.  No systemic symptoms.  Follow-up if any change in symptoms  BMI:   Estimated body mass index is 41.05 kg/m  as calculated from the following:    Height as of 8/5/19: 1.753 m (5' 9\").    Weight as of this encounter: 126.1 kg (278 lb).     [facility]      "

## 2019-08-14 ENCOUNTER — OFFICE VISIT (OUTPATIENT)
Dept: CARDIOLOGY | Facility: CLINIC | Age: 64
End: 2019-08-14
Payer: COMMERCIAL

## 2019-08-14 VITALS
HEIGHT: 69 IN | DIASTOLIC BLOOD PRESSURE: 66 MMHG | SYSTOLIC BLOOD PRESSURE: 139 MMHG | HEART RATE: 74 BPM | WEIGHT: 277.9 LBS | BODY MASS INDEX: 41.16 KG/M2

## 2019-08-14 DIAGNOSIS — E66.01 MORBID OBESITY (H): Primary | ICD-10-CM

## 2019-08-14 DIAGNOSIS — I10 ESSENTIAL HYPERTENSION, BENIGN: ICD-10-CM

## 2019-08-14 DIAGNOSIS — I77.1 SUBCLAVIAN ARTERY STENOSIS, LEFT (H): ICD-10-CM

## 2019-08-14 PROCEDURE — 99204 OFFICE O/P NEW MOD 45 MIN: CPT | Performed by: INTERNAL MEDICINE

## 2019-08-14 ASSESSMENT — MIFFLIN-ST. JEOR: SCORE: 2035.93

## 2019-08-14 NOTE — LETTER
8/14/2019    Kirill Beal MD  [address]  [place] [address]    RE: Brian Flynn       Dear Colleague,    I had the pleasure of seeing Brian CHIARA Flynn in the [facility].    HPI and Plan:   It is a pleasure for me to see this very pleasant 64-year-old gentleman for cardiac evaluation.  He is retired and enjoys occasionally babysitting his grandchildren.  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    He has no previous cardiac history though there are multiple cardiac risk factors.  He has been hypertensive for a while.  He had an echocardiogram in the past few years showing moderate concentric left ventricular hypertrophy.  I believe his EKG shows this as well.  He tells me he is borderline diabetic.  He has mixed hyperlipidemia with total cholesterol 220, HDL 35  and triglycerides at 171.  Family history is not positive for premature atherosclerotic disease but it is positive in that his father had a stroke when he was in his 60s.  He does not smoke drink or abuse drugs.    Several weeks ago he had an isolated episode of right upper chest discomfort which was lasted 10 seconds.  He does not remember under what context this occurred but it has not recurred.  I highly doubt that this is cardiac in origin. However he went on to have a stress nuclear study which I personally interpreted.  There is a small area of distal lateral wall ischemia with normal left ventricular systolic function.    He tells me that for long time his blood pressure in the left arm is higher than the blood pressure in his right arm.  I personally checked this.  He is Apsley correct.  In his right arm I got a blood pressure 132/60.  In his left arm it is 156/62.  I did not detect any differences in pulses between upper limbs and I did not hear any subclavian bruits.  He complains of bilateral lower limb swelling which I think is due to varicose veins.  By cardiac  examination he has no evidence of congestive heart failure.    Impression  1.  Atypical right-sided chest discomfort highly unlikely be cardiac in origin.  2.  Mildly abnormal stress nuclear study showing a small amount of myocardium in jeopardy.  He has no symptoms of angina.  Continue medical management as the risk of invasive cardiac procedure likely exceeds the risk of adverse events secondary to ischemic heart disease.  We will follow with periodic stress testing as long as he remains asymptomatic.  3.  Differential blood pressure between his arms.  Suspect subclavian stenosis.  Will request ultrasound for further evaluation.  If images unsatisfactory may need CT scan  4.  Hypertension.  Continue medical management.  He tells me blood pressure at home usually around 1 10-1 20.  Coming to the doctor's offices make him anxious he has whitecoat hypertension.  5.  Morbid obesity.  Advised portion control and resumption of exercise.  He remembers feeling a lot better when he was exercising on a regular basis  6.  Mixed hyperlipidemia.  Strongly advised to start a statin.  Even assuming he has no peripheral arterial disease his 10-year risk per ACC calculator exceeds 20%.  7.  Venous stasis.  Weight loss will help    I have provisionally arranged to see him again in 6 months time.      Orders Placed This Encounter   Procedures     US Upper Ext Arterial Duplex Bilateral     Follow-Up with Cardiologist       No orders of the defined types were placed in this encounter.      Encounter Diagnoses   Name Primary?     Morbid obesity (H) Yes     Essential hypertension, benign      Subclavian artery stenosis, left (H)        CURRENT MEDICATIONS:  Current Outpatient Medications   Medication Sig Dispense Refill     amLODIPine (NORVASC) 10 MG tablet Take 1 tablet (10 mg) by mouth daily 90 tablet 1     atorvastatin (LIPITOR) 20 MG tablet Take 1 tablet (20 mg) by mouth daily 90 tablet 3     lisinopril-hydrochlorothiazide  (PRINZIDE/ZESTORETIC) 20-25 MG tablet Take 1 tablet by mouth daily 90 tablet 1     metoprolol succinate ER (TOPROL-XL) 50 MG 24 hr tablet Take 1 tablet (50 mg) by mouth daily 90 tablet 1     STATIN NOT PRESCRIBED (INTENTIONAL) Please choose reason not prescribed, below       cephALEXin (KEFLEX) 500 MG capsule Take 2 capsules (1,000 mg) by mouth 2 times daily (Patient not taking: Reported on 8/13/2019) 40 capsule 0     sulfamethoxazole-trimethoprim (BACTRIM DS) 800-160 MG tablet Take 1 tablet by mouth 2 times daily (Patient not taking: Reported on 8/13/2019) 20 tablet 0       ALLERGIES     Allergies   Allergen Reactions     Fluticasone Propionate      Sped system up for one week prior to one use       PAST MEDICAL HISTORY:  Past Medical History:   Diagnosis Date     BENIGN HYPERTENSION 7/13/2007     BLIND Right Eye     since birth     Obesity, unspecified        PAST SURGICAL HISTORY:  Past Surgical History:   Procedure Laterality Date     CIRCUMCISION,CLAMP  11/07    for Severe phimosis with balanoposthitis       FAMILY HISTORY:  Family History   Problem Relation Age of Onset     Cancer Mother         ovarian     Hypertension Father      Cerebrovascular Disease Father         age 69     Asthma Father      Diabetes No family hx of      Breast Cancer No family hx of      Cancer - colorectal No family hx of      Prostate Cancer No family hx of        SOCIAL HISTORY:  Social History     Socioeconomic History     Marital status:      Spouse name: Yvonne     Number of children: 3     Years of education: None     Highest education level: None   Occupational History     Occupation: [employer]     Comment: [employer]   Social Needs     Financial resource strain: None     Food insecurity:     Worry: None     Inability: None     Transportation needs:     Medical: None     Non-medical: None   Tobacco Use     Smoking status: Never Smoker     Smokeless tobacco: Never Used   Substance and Sexual Activity      "Alcohol use: No     Drug use: No     Sexual activity: Yes     Partners: Female   Lifestyle     Physical activity:     Days per week: None     Minutes per session: None     Stress: None   Relationships     Social connections:     Talks on phone: None     Gets together: None     Attends [employer] service: None     Active member of club or organization: None     Attends meetings of clubs or organizations: None     Relationship status: None     Intimate partner violence:     Fear of current or ex partner: None     Emotionally abused: None     Physically abused: None     Forced sexual activity: None   Other Topics Concern      Service No     Blood Transfusions Yes     Caffeine Concern No     Occupational Exposure Yes     Hobby Hazards No     Sleep Concern No     Stress Concern No     Weight Concern Yes     Special Diet No     Back Care No     Exercise Yes     Bike Helmet Yes     Seat Belt Yes     Self-Exams No     Parent/sibling w/ CABG, MI or angioplasty before 65F 55M? Not Asked   Social History Narrative     None       Review of Systems:  Skin:  Positive for     Eyes:  Negative    ENT:  Negative    Respiratory:  Positive for dyspnea on exertion  Cardiovascular:  palpitations;Negative;dizziness;syncope or near-syncope;cyanosis Positive for;lightheadedness;exercise intolerance;edema  Gastroenterology: Negative    Genitourinary:  Negative    Musculoskeletal:  Positive for arthritis  Neurologic:  Negative    Psychiatric:  Negative    Heme/Lymph/Imm:  Negative    Endocrine:  Negative      Physical Exam:  Vitals: /66 (BP Location: Left arm, Cuff Size: Adult Large)   Pulse 74   Ht 1.753 m (5' 9\")   Wt 126.1 kg (277 lb 14.4 oz)   BMI 41.04 kg/m       Constitutional:  cooperative, alert and oriented, well developed, well nourished, in no acute distress        Skin:  warm and dry to the touch, no apparent skin lesions or masses noted venous stasis changes        Head:  normocephalic, no masses or lesions    "     Eyes:  pupils equal and round, conjunctivae and lids unremarkable, sclera white, no xanthalasma, EOMS intact, no nystagmus        Lymph:No Cervical lymphadenopathy present     ENT:  no pallor or cyanosis, dentition good        Neck:  carotid pulses are full and equal bilaterally, JVP normal, no carotid bruit        Respiratory:  normal breath sounds, clear to auscultation, normal A-P diameter, normal symmetry, normal respiratory excursion, no use of accessory muscles         Cardiac: regular rhythm, normal S1/S2, no S3 or S4, apical impulse not displaced, no murmurs, gallops or rubs                  pulses below the femoral arteries are diminished                                      GI:  abdomen soft, non-tender, BS normoactive, no mass, no HSM, no bruits        Extremities and Muscular Skeletal:  no spinal abnormalities noted   bilateral LE edema;trace          Neurological:  no gross motor deficits        Psych:  Alert and Oriented x 3        Recent Lab Results:  LIPID RESULTS:  Lab Results   Component Value Date    CHOL 220 (H) 01/10/2019    HDL 35 (L) 01/10/2019     (H) 01/10/2019    TRIG 171 (H) 01/10/2019    CHOLHDLRATIO 6.0 (H) 01/06/2009       LIVER ENZYME RESULTS:  Lab Results   Component Value Date    AST 13 01/10/2019    ALT 37 01/10/2019       CBC RESULTS:  Lab Results   Component Value Date    WBC 14.5 (H) 08/03/2019    RBC 4.55 08/03/2019    HGB 14.0 08/03/2019    HCT 40.3 08/03/2019    MCV 89 08/03/2019    MCH 30.8 08/03/2019    MCHC 34.7 08/03/2019    RDW 13.0 08/03/2019     08/03/2019       BMP RESULTS:  Lab Results   Component Value Date     08/03/2019    POTASSIUM 3.8 08/03/2019    CHLORIDE 108 08/03/2019    CO2 28 08/03/2019    ANIONGAP 5 08/03/2019     (H) 08/03/2019    BUN 25 08/03/2019    CR 1.23 08/03/2019    GFRESTIMATED 61 08/03/2019    GFRESTBLACK 71 08/03/2019    ADELINE 8.8 08/03/2019        A1C RESULTS:  Lab Results   Component Value Date    A1C 6.3 (H)  08/03/2019       INR RESULTS:  No results found for: INR          Thank you for allowing me to participate in the care of your patient.    Sincerely,     DR NICHOLAS LÓPEZ MD     [facility]

## 2019-08-14 NOTE — PROGRESS NOTES
HPI and Plan:   It is a pleasure for me to see this very pleasant 64-year-old gentleman for cardiac evaluation.  He is retired and enjoys occasionally babysitting his grandchildren.  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    He has no previous cardiac history though there are multiple cardiac risk factors.  He has been hypertensive for a while.  He had an echocardiogram in the past few years showing moderate concentric left ventricular hypertrophy.  I believe his EKG shows this as well.  He tells me he is borderline diabetic.  He has mixed hyperlipidemia with total cholesterol 220, HDL 35  and triglycerides at 171.  Family history is not positive for premature atherosclerotic disease but it is positive in that his father had a stroke when he was in his 60s.  He does not smoke drink or abuse drugs.    Several weeks ago he had an isolated episode of right upper chest discomfort which was lasted 10 seconds.  He does not remember under what context this occurred but it has not recurred.  I highly doubt that this is cardiac in origin. However he went on to have a stress nuclear study which I personally interpreted.  There is a small area of distal lateral wall ischemia with normal left ventricular systolic function.    He tells me that for long time his blood pressure in the left arm is higher than the blood pressure in his right arm.  I personally checked this.  He is [place] correct.  In his right arm I got a blood pressure 132/60.  In his left arm it is 156/62.  I did not detect any differences in pulses between upper limbs and I did not hear any subclavian bruits.  He complains of bilateral lower limb swelling which I think is due to varicose veins.  By cardiac examination he has no evidence of congestive heart failure.    Impression  1.  Atypical right-sided chest discomfort highly unlikely be cardiac in origin.  2.  Mildly abnormal stress nuclear study showing a small amount of  myocardium in jeopardy.  He has no symptoms of angina.  Continue medical management as the risk of invasive cardiac procedure likely exceeds the risk of adverse events secondary to ischemic heart disease.  We will follow with periodic stress testing as long as he remains asymptomatic.  3.  Differential blood pressure between his arms.  Suspect subclavian stenosis.  Will request ultrasound for further evaluation.  If images unsatisfactory may need CT scan  4.  Hypertension.  Continue medical management.  He tells me blood pressure at home usually around 1 10-1 20.  Coming to the doctor's offices make him anxious he has whitecoat hypertension.  5.  Morbid obesity.  Advised portion control and resumption of exercise.  He remembers feeling a lot better when he was exercising on a regular basis  6.  Mixed hyperlipidemia.  Strongly advised to start a statin.  Even assuming he has no peripheral arterial disease his 10-year risk per ACC calculator exceeds 20%.  7.  Venous stasis.  Weight loss will help    I have provisionally arranged to see him again in 6 months time.      Orders Placed This Encounter   Procedures     US Upper Ext Arterial Duplex Bilateral     Follow-Up with Cardiologist       No orders of the defined types were placed in this encounter.      Encounter Diagnoses   Name Primary?     Morbid obesity (H) Yes     Essential hypertension, benign      Subclavian artery stenosis, left (H)        CURRENT MEDICATIONS:  Current Outpatient Medications   Medication Sig Dispense Refill     amLODIPine (NORVASC) 10 MG tablet Take 1 tablet (10 mg) by mouth daily 90 tablet 1     atorvastatin (LIPITOR) 20 MG tablet Take 1 tablet (20 mg) by mouth daily 90 tablet 3     lisinopril-hydrochlorothiazide (PRINZIDE/ZESTORETIC) 20-25 MG tablet Take 1 tablet by mouth daily 90 tablet 1     metoprolol succinate ER (TOPROL-XL) 50 MG 24 hr tablet Take 1 tablet (50 mg) by mouth daily 90 tablet 1     STATIN NOT PRESCRIBED (INTENTIONAL)  Please choose reason not prescribed, below       cephALEXin (KEFLEX) 500 MG capsule Take 2 capsules (1,000 mg) by mouth 2 times daily (Patient not taking: Reported on 8/13/2019) 40 capsule 0     sulfamethoxazole-trimethoprim (BACTRIM DS) 800-160 MG tablet Take 1 tablet by mouth 2 times daily (Patient not taking: Reported on 8/13/2019) 20 tablet 0       ALLERGIES     Allergies   Allergen Reactions     Fluticasone Propionate      Sped system up for one week prior to one use       PAST MEDICAL HISTORY:  Past Medical History:   Diagnosis Date     BENIGN HYPERTENSION 7/13/2007     BLIND Right Eye     since birth     Obesity, unspecified        PAST SURGICAL HISTORY:  Past Surgical History:   Procedure Laterality Date     CIRCUMCISION,CLAMP  11/07    for Severe phimosis with balanoposthitis       FAMILY HISTORY:  Family History   Problem Relation Age of Onset     Cancer Mother         ovarian     Hypertension Father      Cerebrovascular Disease Father         age 69     Asthma Father      Diabetes No family hx of      Breast Cancer No family hx of      Cancer - colorectal No family hx of      Prostate Cancer No family hx of        SOCIAL HISTORY:  Social History     Socioeconomic History     Marital status:      Spouse name: Yvonne     Number of children: 3     Years of education: None     Highest education level: None   Occupational History     Occupation: [employer]     Comment: [employer]   Social Needs     Financial resource strain: None     Food insecurity:     Worry: None     Inability: None     Transportation needs:     Medical: None     Non-medical: None   Tobacco Use     Smoking status: Never Smoker     Smokeless tobacco: Never Used   Substance and Sexual Activity     Alcohol use: No     Drug use: No     Sexual activity: Yes     Partners: Female   Lifestyle     Physical activity:     Days per week: None     Minutes per session: None     Stress: None   Relationships     Social connections:      "Talks on phone: None     Gets together: None     Attends Religion service: None     Active member of club or organization: None     Attends meetings of clubs or organizations: None     Relationship status: None     Intimate partner violence:     Fear of current or ex partner: None     Emotionally abused: None     Physically abused: None     Forced sexual activity: None   Other Topics Concern      Service No     Blood Transfusions Yes     Caffeine Concern No     Occupational Exposure Yes     Hobby Hazards No     Sleep Concern No     Stress Concern No     Weight Concern Yes     Special Diet No     Back Care No     Exercise Yes     Bike Helmet Yes     Seat Belt Yes     Self-Exams No     Parent/sibling w/ CABG, MI or angioplasty before 65F 55M? Not Asked   Social History Narrative     None       Review of Systems:  Skin:  Positive for     Eyes:  Negative    ENT:  Negative    Respiratory:  Positive for dyspnea on exertion  Cardiovascular:  palpitations;Negative;dizziness;syncope or near-syncope;cyanosis Positive for;lightheadedness;exercise intolerance;edema  Gastroenterology: Negative    Genitourinary:  Negative    Musculoskeletal:  Positive for arthritis  Neurologic:  Negative    Psychiatric:  Negative    Heme/Lymph/Imm:  Negative    Endocrine:  Negative      Physical Exam:  Vitals: /66 (BP Location: Left arm, Cuff Size: Adult Large)   Pulse 74   Ht 1.753 m (5' 9\")   Wt 126.1 kg (277 lb 14.4 oz)   BMI 41.04 kg/m      Constitutional:  cooperative, alert and oriented, well developed, well nourished, in no acute distress        Skin:  warm and dry to the touch, no apparent skin lesions or masses noted venous stasis changes        Head:  normocephalic, no masses or lesions        Eyes:  pupils equal and round, conjunctivae and lids unremarkable, sclera white, no xanthalasma, EOMS intact, no nystagmus        Lymph:No Cervical lymphadenopathy present     ENT:  no pallor or cyanosis, dentition good    "     Neck:  carotid pulses are full and equal bilaterally, JVP normal, no carotid bruit        Respiratory:  normal breath sounds, clear to auscultation, normal A-P diameter, normal symmetry, normal respiratory excursion, no use of accessory muscles         Cardiac: regular rhythm, normal S1/S2, no S3 or S4, apical impulse not displaced, no murmurs, gallops or rubs                  pulses below the femoral arteries are diminished                                      GI:  abdomen soft, non-tender, BS normoactive, no mass, no HSM, no bruits        Extremities and Muscular Skeletal:  no spinal abnormalities noted   bilateral LE edema;trace          Neurological:  no gross motor deficits        Psych:  Alert and Oriented x 3        Recent Lab Results:  LIPID RESULTS:  Lab Results   Component Value Date    CHOL 220 (H) 01/10/2019    HDL 35 (L) 01/10/2019     (H) 01/10/2019    TRIG 171 (H) 01/10/2019    CHOLHDLRATIO 6.0 (H) 01/06/2009       LIVER ENZYME RESULTS:  Lab Results   Component Value Date    AST 13 01/10/2019    ALT 37 01/10/2019       CBC RESULTS:  Lab Results   Component Value Date    WBC 14.5 (H) 08/03/2019    RBC 4.55 08/03/2019    HGB 14.0 08/03/2019    HCT 40.3 08/03/2019    MCV 89 08/03/2019    MCH 30.8 08/03/2019    MCHC 34.7 08/03/2019    RDW 13.0 08/03/2019     08/03/2019       BMP RESULTS:  Lab Results   Component Value Date     08/03/2019    POTASSIUM 3.8 08/03/2019    CHLORIDE 108 08/03/2019    CO2 28 08/03/2019    ANIONGAP 5 08/03/2019     (H) 08/03/2019    BUN 25 08/03/2019    CR 1.23 08/03/2019    GFRESTIMATED 61 08/03/2019    GFRESTBLACK 71 08/03/2019    ADELINE 8.8 08/03/2019        A1C RESULTS:  Lab Results   Component Value Date    A1C 6.3 (H) 08/03/2019       INR RESULTS:  No results found for: INR        CC  Kirill Beal MD  [address] [place] [address]

## 2019-08-14 NOTE — LETTER
8/14/2019    Kirill Beal MD  [address]  [place] [address]    RE: Brian Flynn       Dear Colleague,    I had the pleasure of seeing Brian CHIARA Flynn in the [facility].    HPI and Plan:   It is a pleasure for me to see this very pleasant 64-year-old gentleman for cardiac evaluation.  He is retired and enjoys occasionally babysitting his grandchildren.  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    He has no previous cardiac history though there are multiple cardiac risk factors.  He has been hypertensive for a while.  He had an echocardiogram in the past few years showing moderate concentric left ventricular hypertrophy.  I believe his EKG shows this as well.  He tells me he is borderline diabetic.  He has mixed hyperlipidemia with total cholesterol 220, HDL 35  and triglycerides at 171.  Family history is not positive for premature atherosclerotic disease but it is positive in that his father had a stroke when he was in his 60s.  He does not smoke drink or abuse drugs.    Several weeks ago he had an isolated episode of right upper chest discomfort which was lasted 10 seconds.  He does not remember under what context this occurred but it has not recurred.  I highly doubt that this is cardiac in origin. However he went on to have a stress nuclear study which I personally interpreted.  There is a small area of distal lateral wall ischemia with normal left ventricular systolic function.    He tells me that for long time his blood pressure in the left arm is higher than the blood pressure in his right arm.  I personally checked this.  He is [place] correct.  In his right arm I got a blood pressure 132/60.  In his left arm it is 156/62.  I did not detect any differences in pulses between upper limbs and I did not hear any subclavian bruits.  He complains of bilateral lower limb swelling which I think is due to varicose veins.  By cardiac  examination he has no evidence of congestive heart failure.    Impression  1.  Atypical right-sided chest discomfort highly unlikely be cardiac in origin.  2.  Mildly abnormal stress nuclear study showing a small amount of myocardium in jeopardy.  He has no symptoms of angina.  Continue medical management as the risk of invasive cardiac procedure likely exceeds the risk of adverse events secondary to ischemic heart disease.  We will follow with periodic stress testing as long as he remains asymptomatic.  3.  Differential blood pressure between his arms.  Suspect subclavian stenosis.  Will request ultrasound for further evaluation.  If images unsatisfactory may need CT scan  4.  Hypertension.  Continue medical management.  He tells me blood pressure at home usually around 1 10-1 20.  Coming to the doctor's offices make him anxious he has whitecoat hypertension.  5.  Morbid obesity.  Advised portion control and resumption of exercise.  He remembers feeling a lot better when he was exercising on a regular basis  6.  Mixed hyperlipidemia.  Strongly advised to start a statin.  Even assuming he has no peripheral arterial disease his 10-year risk per ACC calculator exceeds 20%.  7.  Venous stasis.  Weight loss will help    I have provisionally arranged to see him again in 6 months time.      Orders Placed This Encounter   Procedures     US Upper Ext Arterial Duplex Bilateral     Follow-Up with Cardiologist       No orders of the defined types were placed in this encounter.      Encounter Diagnoses   Name Primary?     Morbid obesity (H) Yes     Essential hypertension, benign      Subclavian artery stenosis, left (H)        CURRENT MEDICATIONS:  Current Outpatient Medications   Medication Sig Dispense Refill     amLODIPine (NORVASC) 10 MG tablet Take 1 tablet (10 mg) by mouth daily 90 tablet 1     atorvastatin (LIPITOR) 20 MG tablet Take 1 tablet (20 mg) by mouth daily 90 tablet 3     lisinopril-hydrochlorothiazide  (PRINZIDE/ZESTORETIC) 20-25 MG tablet Take 1 tablet by mouth daily 90 tablet 1     metoprolol succinate ER (TOPROL-XL) 50 MG 24 hr tablet Take 1 tablet (50 mg) by mouth daily 90 tablet 1     STATIN NOT PRESCRIBED (INTENTIONAL) Please choose reason not prescribed, below       cephALEXin (KEFLEX) 500 MG capsule Take 2 capsules (1,000 mg) by mouth 2 times daily (Patient not taking: Reported on 8/13/2019) 40 capsule 0     sulfamethoxazole-trimethoprim (BACTRIM DS) 800-160 MG tablet Take 1 tablet by mouth 2 times daily (Patient not taking: Reported on 8/13/2019) 20 tablet 0       ALLERGIES     Allergies   Allergen Reactions     Fluticasone Propionate      Sped system up for one week prior to one use       PAST MEDICAL HISTORY:  Past Medical History:   Diagnosis Date     BENIGN HYPERTENSION 7/13/2007     BLIND Right Eye     since birth     Obesity, unspecified        PAST SURGICAL HISTORY:  Past Surgical History:   Procedure Laterality Date     CIRCUMCISION,CLAMP  11/07    for Severe phimosis with balanoposthitis       FAMILY HISTORY:  Family History   Problem Relation Age of Onset     Cancer Mother         ovarian     Hypertension Father      Cerebrovascular Disease Father         age 69     Asthma Father      Diabetes No family hx of      Breast Cancer No family hx of      Cancer - colorectal No family hx of      Prostate Cancer No family hx of        SOCIAL HISTORY:  Social History     Socioeconomic History     Marital status:      Spouse name: Yvonne     Number of children: 3     Years of education: None     Highest education level: None   Occupational History     Occupation: [employer]     Comment: [employer]   Social Needs     Financial resource strain: None     Food insecurity:     Worry: None     Inability: None     Transportation needs:     Medical: None     Non-medical: None   Tobacco Use     Smoking status: Never Smoker     Smokeless tobacco: Never Used   Substance and Sexual Activity      "Alcohol use: No     Drug use: No     Sexual activity: Yes     Partners: Female   Lifestyle     Physical activity:     Days per week: None     Minutes per session: None     Stress: None   Relationships     Social connections:     Talks on phone: None     Gets together: None     Attends Mu-ism service: None     Active member of club or organization: None     Attends meetings of clubs or organizations: None     Relationship status: None     Intimate partner violence:     Fear of current or ex partner: None     Emotionally abused: None     Physically abused: None     Forced sexual activity: None   Other Topics Concern      Service No     Blood Transfusions Yes     Caffeine Concern No     Occupational Exposure Yes     Hobby Hazards No     Sleep Concern No     Stress Concern No     Weight Concern Yes     Special Diet No     Back Care No     Exercise Yes     Bike Helmet Yes     Seat Belt Yes     Self-Exams No     Parent/sibling w/ CABG, MI or angioplasty before 65F 55M? Not Asked   Social History Narrative     None       Review of Systems:  Skin:  Positive for     Eyes:  Negative    ENT:  Negative    Respiratory:  Positive for dyspnea on exertion  Cardiovascular:  palpitations;Negative;dizziness;syncope or near-syncope;cyanosis Positive for;lightheadedness;exercise intolerance;edema  Gastroenterology: Negative    Genitourinary:  Negative    Musculoskeletal:  Positive for arthritis  Neurologic:  Negative    Psychiatric:  Negative    Heme/Lymph/Imm:  Negative    Endocrine:  Negative      Physical Exam:  Vitals: /66 (BP Location: Left arm, Cuff Size: Adult Large)   Pulse 74   Ht 1.753 m (5' 9\")   Wt 126.1 kg (277 lb 14.4 oz)   BMI 41.04 kg/m       Constitutional:  cooperative, alert and oriented, well developed, well nourished, in no acute distress        Skin:  warm and dry to the touch, no apparent skin lesions or masses noted venous stasis changes        Head:  normocephalic, no masses or lesions    "     Eyes:  pupils equal and round, conjunctivae and lids unremarkable, sclera white, no xanthalasma, EOMS intact, no nystagmus        Lymph:No Cervical lymphadenopathy present     ENT:  no pallor or cyanosis, dentition good        Neck:  carotid pulses are full and equal bilaterally, JVP normal, no carotid bruit        Respiratory:  normal breath sounds, clear to auscultation, normal A-P diameter, normal symmetry, normal respiratory excursion, no use of accessory muscles         Cardiac: regular rhythm, normal S1/S2, no S3 or S4, apical impulse not displaced, no murmurs, gallops or rubs                  pulses below the femoral arteries are diminished                                      GI:  abdomen soft, non-tender, BS normoactive, no mass, no HSM, no bruits        Extremities and Muscular Skeletal:  no spinal abnormalities noted   bilateral LE edema;trace          Neurological:  no gross motor deficits        Psych:  Alert and Oriented x 3        Recent Lab Results:  LIPID RESULTS:  Lab Results   Component Value Date    CHOL 220 (H) 01/10/2019    HDL 35 (L) 01/10/2019     (H) 01/10/2019    TRIG 171 (H) 01/10/2019    CHOLHDLRATIO 6.0 (H) 01/06/2009       LIVER ENZYME RESULTS:  Lab Results   Component Value Date    AST 13 01/10/2019    ALT 37 01/10/2019       CBC RESULTS:  Lab Results   Component Value Date    WBC 14.5 (H) 08/03/2019    RBC 4.55 08/03/2019    HGB 14.0 08/03/2019    HCT 40.3 08/03/2019    MCV 89 08/03/2019    MCH 30.8 08/03/2019    MCHC 34.7 08/03/2019    RDW 13.0 08/03/2019     08/03/2019       BMP RESULTS:  Lab Results   Component Value Date     08/03/2019    POTASSIUM 3.8 08/03/2019    CHLORIDE 108 08/03/2019    CO2 28 08/03/2019    ANIONGAP 5 08/03/2019     (H) 08/03/2019    BUN 25 08/03/2019    CR 1.23 08/03/2019    GFRESTIMATED 61 08/03/2019    GFRESTBLACK 71 08/03/2019    ADELINE 8.8 08/03/2019        A1C RESULTS:  Lab Results   Component Value Date    A1C 6.3 (H)  08/03/2019       INR RESULTS:  No results found for: INR        CC  Kirill Beal MD  [address] [place] [address] [place] [address]                  Thank you for allowing me to participate in the care of your patient.      Sincerely,     DR NICHOLAS LÓPEZ MD     [facility]    cc:   Kirill Beal MD  [address] [place] [address] [place] [address]

## 2019-08-29 ENCOUNTER — NURSE TRIAGE (OUTPATIENT)
Dept: FAMILY MEDICINE | Facility: CLINIC | Age: 64
End: 2019-08-29

## 2019-08-29 ENCOUNTER — HOSPITAL ENCOUNTER (OUTPATIENT)
Dept: ULTRASOUND IMAGING | Facility: CLINIC | Age: 64
Discharge: HOME | End: 2019-08-29
Attending: INTERNAL MEDICINE | Admitting: INTERNAL MEDICINE
Payer: COMMERCIAL

## 2019-08-29 DIAGNOSIS — I77.1 SUBCLAVIAN ARTERY STENOSIS, LEFT (H): ICD-10-CM

## 2019-08-29 PROCEDURE — 93930 UPPER EXTREMITY STUDY: CPT

## 2019-08-29 PROCEDURE — 93930 UPPER EXTREMITY STUDY: CPT | Mod: 26 | Performed by: INTERNAL MEDICINE

## 2019-08-29 NOTE — TELEPHONE ENCOUNTER
Patient calling regarding follow up from OV 8/13/19 and 8/5/19. Patient had an abscess on the L. Lower leg and was seen by both MD Recinos and MD Beal. Patient was prescribed Keflex and Bactrim. Patient has been off antibiotics, but still feels like the abscess is healing slowly. Patient states it looks better than a couple of weeks ago, but wanted to follow up with MD Beal.     Sx  Mild Pain in region.   Redness just around healing wound/not widespread about 1/2 inch of redness around.     Denies fever, swelling in extremity, widespread redness, red streak going up the leg, no pus or discolored drainage, SOB, chest pain, fatigue.     Routing to PCP. Do you want patient to continue to monitor or would you like to see him for office visit?    Additional Information    Negative: Painful lump or swelling at opening to anus (rectum)    Negative: Painful lump or swelling on scrotum    Negative: Painful lump or swelling at opening to vagina (on labia)    Negative: Impetigo suspected or diagnosed    Negative: Doesn't match the SYMPTOMS of a boil    Negative: MRSA, questions about (No boil or other skin lesion)    Negative: [1] Widespread rash AND [2] bright red, sunburn-like AND [3] too weak to stand    Negative: Sounds like a life-threatening emergency to the triager    Negative: Widespread red rash    Negative: Patient sounds very sick or weak to the triager    Negative: Black (necrotic) color or blisters develop in wound    Negative: SEVERE pain (e.g., excruciating)    Negative: Red streak from area of infection    Negative: Fever > 100.5 F (38.1 C)    Negative: [1] Boil > 1/2 inch across (> 12 mm; larger than a marble) AND [2] center is soft or pus colored    Negative: Boil > 2 inches across (> 5 cm; larger than a golf ball or ping pong ball)    Negative: [1] Boil > 1/4 inch across (> 6 mm; larger than a pencil eraser) AND [2] on face    Negative: [1] Boil AND [2] diabetes mellitus or weak immune system (e.g., HIV  "positive, cancer chemotherapy, transplant patient)    Negative: 2 or more boils    Negative: [1] Spreading redness around the boil AND [2] no fever    Negative: Boil > 1/2 inch across (> 12 mm; larger than a marble)    Negative: [1] Boil AND [2] not improved > 3 days following CARE ADVICE    Negative: Boils are a recurrent problem for this patient (patient with no boil now)    Negative: Boils are a recurrent problem for this family (patient with no boil now)    Negative: Boil < 1/2 inch across (< 12 mm; smaller than a marble)    Negative: Pimple, not a boil    Negative: Prevention of boils, questions about    Negative: Exposure to a boil, questions about    Answer Assessment - Initial Assessment Questions  1. APPEARANCE of BOIL: \"What does the boil look like?\"       It red around the outside of abscess, middle starting to heal, the outside around it.   2. LOCATION: \"Where is the boil located?\"       Lower L leg  3. NUMBER: \"How many boils are there?\"       1 area.   4. SIZE: \"How big is the boil?\" (e.g., inches, cm; compare to size of a coin or other object)      Same size as office visit, it was pretty deep, but is is better.   5. ONSET: \"When did the boil start?\"      Started a couple of weeks ago. Blister broke and it got infected. Saw Emigdio on 8/13/19. Pretty bad infection per patient statement.   6. PAIN: \"Is there any pain?\" If so, ask: \"How bad is the pain?\"   (Scale 1-10; or mild, moderate, severe)      Yes to pain, mild.   7. FEVER: \"Do you have a fever?\" If so, ask: \"What is it, how was it measured, and when did it start?\"       No fevers.  8. SOURCE: \"Have you been around anyone with boils or other Staph infections?\" \"Have you ever had boils before?\"      n/a  9. OTHER SYMPTOMS: \"Do you have any other symptoms?\" (e.g., shaking chills, weakness, rash elsewhere on body)      Redness 1/2 inch around. Infection was a lot worse earlier. Seems to be better.   10. PREGNANCY: \"Is there any chance you are " "pregnant?\" \"When was your last menstrual period?\"        no    Protocols used: BOIL (SKIN ABSCESS)-A-AH      Okay to leave a detailed message? YES    Rehana Emery RN, BSN  [facility]    "

## 2019-08-29 NOTE — TELEPHONE ENCOUNTER
Patient calling into clinic stating he would like to be seen by pcp stating the sore on his foot is not healing. Routing to Triage to advise.      .Lena LOPES    [facility] [place]

## 2019-08-29 NOTE — TELEPHONE ENCOUNTER
Called patient to inform him of MD response. Patient/parent verbalized understanding and agrees with plan.       Rehana Emery RN, BSN  [facility] [place]

## 2019-08-29 NOTE — TELEPHONE ENCOUNTER
If the redness is not worsening and there is no fever I would suggest continue observation if there is any change in symptoms over time I would suggest to follow-up .

## 2019-08-30 ENCOUNTER — TELEPHONE (OUTPATIENT)
Dept: CARDIOLOGY | Facility: CLINIC | Age: 64
End: 2019-08-30

## 2019-08-30 NOTE — TELEPHONE ENCOUNTER
Impression:  1) Elevation in left subclavian artery pressure, but not in the range  felt to be clinically significant (>240 cm/sec).  No stenosis in area  visualized.      Findings:      Peak systolic velocity left upper extremity:       Patient called requesting results of his Duplex US  Per Dr. Mckeon's review some narrowing in left arm at this time it requires observation.  Patient verbalized understanding and agreed to plan of care.

## 2019-12-11 ENCOUNTER — OFFICE VISIT (OUTPATIENT)
Dept: FAMILY MEDICINE | Facility: CLINIC | Age: 64
End: 2019-12-11
Payer: COMMERCIAL

## 2019-12-11 VITALS
DIASTOLIC BLOOD PRESSURE: 82 MMHG | HEART RATE: 78 BPM | OXYGEN SATURATION: 95 % | BODY MASS INDEX: 41.05 KG/M2 | TEMPERATURE: 97.9 F | SYSTOLIC BLOOD PRESSURE: 136 MMHG | WEIGHT: 278 LBS

## 2019-12-11 DIAGNOSIS — I10 ESSENTIAL HYPERTENSION, BENIGN: ICD-10-CM

## 2019-12-11 DIAGNOSIS — E66.01 MORBID OBESITY (H): ICD-10-CM

## 2019-12-11 DIAGNOSIS — Z23 NEED FOR VACCINATION: Primary | ICD-10-CM

## 2019-12-11 DIAGNOSIS — Z13.6 CARDIOVASCULAR SCREENING; LDL GOAL LESS THAN 130: ICD-10-CM

## 2019-12-11 DIAGNOSIS — R73.03 PREDIABETES: ICD-10-CM

## 2019-12-11 LAB — HBA1C MFR BLD: 5.5 % (ref 0–5.6)

## 2019-12-11 PROCEDURE — 90715 TDAP VACCINE 7 YRS/> IM: CPT | Performed by: FAMILY MEDICINE

## 2019-12-11 PROCEDURE — 99214 OFFICE O/P EST MOD 30 MIN: CPT | Mod: 25 | Performed by: FAMILY MEDICINE

## 2019-12-11 PROCEDURE — 90471 IMMUNIZATION ADMIN: CPT | Performed by: FAMILY MEDICINE

## 2019-12-11 PROCEDURE — 90670 PCV13 VACCINE IM: CPT | Performed by: FAMILY MEDICINE

## 2019-12-11 PROCEDURE — 83036 HEMOGLOBIN GLYCOSYLATED A1C: CPT | Performed by: FAMILY MEDICINE

## 2019-12-11 PROCEDURE — G0009 ADMIN PNEUMOCOCCAL VACCINE: HCPCS | Mod: 59 | Performed by: FAMILY MEDICINE

## 2019-12-11 PROCEDURE — 36415 COLL VENOUS BLD VENIPUNCTURE: CPT | Performed by: FAMILY MEDICINE

## 2019-12-11 RX ORDER — LISINOPRIL AND HYDROCHLOROTHIAZIDE 20; 25 MG/1; MG/1
1 TABLET ORAL DAILY
Qty: 90 TABLET | Refills: 1 | Status: SHIPPED | OUTPATIENT
Start: 2019-12-11 | End: 2020-06-29

## 2019-12-11 RX ORDER — AMLODIPINE BESYLATE 10 MG/1
10 TABLET ORAL DAILY
Qty: 90 TABLET | Refills: 1 | Status: SHIPPED | OUTPATIENT
Start: 2019-12-11 | End: 2020-06-29

## 2019-12-11 RX ORDER — METOPROLOL SUCCINATE 50 MG/1
50 TABLET, EXTENDED RELEASE ORAL DAILY
Qty: 90 TABLET | Refills: 1 | Status: SHIPPED | OUTPATIENT
Start: 2019-12-11 | End: 2020-06-29

## 2019-12-11 NOTE — PROGRESS NOTES
Subjective     Brian Flynn is a 64 year old male who presents to clinic today for the following health issues:    HPI   Hypertension Follow-up      Do you check your blood pressure regularly outside of the clinic? Yes     Are you following a low salt diet? Yes    Are your blood pressures ever more than 140 on the top number (systolic) OR more   than 90 on the bottom number (diastolic), for example 140/90? No      How many servings of fruits and vegetables do you eat daily?  0-1      How many days per week do you miss taking your medication? 0    Elevated Blood sugar:  Patient also noted to have elevated blood sugar in prediabetic range.  Last hemoglobin A1c at 6.3.  He denies any chest pains no shortness of breath.              Reviewed and updated as needed this visit by Provider         Review of Systems   ROS COMP: Constitutional, HEENT, cardiovascular, pulmonary, gi and gu systems are negative, except as otherwise noted.      Objective    BP (!) 144/80   Pulse 78   Temp 97.9  F (36.6  C) (Tympanic)   Wt 126.1 kg (278 lb)   SpO2 95%   BMI 41.05 kg/m    Body mass index is 41.05 kg/m .  Physical Exam   GENERAL: healthy, alert and no distress  NECK: no adenopathy, no asymmetry, masses, or scars and thyroid normal to palpation  RESP: lungs clear to auscultation - no rales, rhonchi or wheezes  CV: regular rate and rhythm, normal S1 S2, no S3 or S4, no murmur, click or rub, no peripheral edema and peripheral pulses strong  ABDOMEN: soft, nontender, no hepatosplenomegaly, no masses and bowel sounds normal  MS: no gross musculoskeletal defects noted, no edema            Assessment & Plan     1. Essential hypertension, benign  Pressure initially was elevated recheck was better he is currently stable on blood pressure medication refill the medication follow-up 6 months for physical  - amLODIPine (NORVASC) 10 MG tablet; Take 1 tablet (10 mg) by mouth daily  Dispense: 90 tablet; Refill: 1  -  lisinopril-hydrochlorothiazide (PRINZIDE/ZESTORETIC) 20-25 MG tablet; Take 1 tablet by mouth daily  Dispense: 90 tablet; Refill: 1  - metoprolol succinate ER (TOPROL-XL) 50 MG 24 hr tablet; Take 1 tablet (50 mg) by mouth daily  Dispense: 90 tablet; Refill: 1    2. Need for vaccination    - Pneumococcal vaccine 13 valent PCV13 IM (Prevnar) [61986]  - TDAP VACCINE (ADACEL) [34897.002]  - 1st  Administration  [76856]  - Each additional admin.  (Right click and add QUANTITY)  [39698]    3. CARDIOVASCULAR SCREENING; LDL GOAL LESS THAN 130      4. Prediabetes  Hemoglobin A1c stable.  No need of any medication  - Hemoglobin A1c    5. Morbid obesity (H)           Kirill Beal MD  [facility] [place]

## 2020-06-05 ENCOUNTER — VIRTUAL VISIT (OUTPATIENT)
Dept: INTERNAL MEDICINE | Facility: CLINIC | Age: 65
End: 2020-06-05
Payer: COMMERCIAL

## 2020-06-05 VITALS — WEIGHT: 265 LBS | BODY MASS INDEX: 39.13 KG/M2

## 2020-06-05 DIAGNOSIS — R68.83 CHILLS: ICD-10-CM

## 2020-06-05 DIAGNOSIS — R73.03 PREDIABETES: ICD-10-CM

## 2020-06-05 DIAGNOSIS — I10 ESSENTIAL HYPERTENSION, BENIGN: ICD-10-CM

## 2020-06-05 DIAGNOSIS — R05.9 COUGH: Primary | ICD-10-CM

## 2020-06-05 PROCEDURE — 99214 OFFICE O/P EST MOD 30 MIN: CPT | Mod: 95 | Performed by: INTERNAL MEDICINE

## 2020-06-05 NOTE — PATIENT INSTRUCTIONS
*  Covid 19 testing indicated.   You will be contacted to schedule this test.     *  YOu will be contacted as soon as we have results back.     *  Continue all medications at the same doses.  Contact your usual pharmacy if you need refills.    *  Follow up with your usuall primary MD regarding your other health issues.  You are likely due this month for follow up.

## 2020-06-05 NOTE — PROGRESS NOTES
"Brian Flynn is a 65 year old male who is being evaluated via a billable telephone visit.      The patient has been notified of following:     \"This telephone visit will be conducted via a call between you and your physician/provider. We have found that certain health care needs can be provided without the need for a physical exam.  This service lets us provide the care you need with a short phone conversation.  If a prescription is necessary we can send it directly to your pharmacy.  If lab work is needed we can place an order for that and you can then stop by our lab to have the test done at a later time.    Telephone visits are billed at different rates depending on your insurance coverage. During this emergency period, for some insurers they may be billed the same as an in-person visit.  Please reach out to your insurance provider with any questions.    If during the course of the call the physician/provider feels a telephone visit is not appropriate, you will not be charged for this service.\"    Patient has given verbal consent for Telephone visit?  Yes    What phone number would you like to be contacted at? [phone]    How would you like to obtain your AVS? Abrilt    Subjective     Brian Flynn is a 65 year old male who presents via phone visit today for the following health issues:    HPI  RESPIRATORY SYMPTOMS      Duration: 1 week     Description  sore throat    Severity: mild    Accompanying signs and symptoms: None    History (predisposing factors):  none    Precipitating or alleviating factors: None    Therapies tried and outcome:  Tylenol        Said a cough and sore throat for the past 1 week.  Cough is generally nonproductive.  No outbreak obvious fevers or significant temperature elevation, he does report some \"chills \".  No nausea, no vomiting or diarrhea.  Has a headache that Tylenol helps.    His son's family have all experience COVID-19 and have recovered and developed antibody " positive tests, but he has not seen them or had any contact with them for over 2 weeks.      2.    Hypertension:  Blood presure remains well controlled at home  Readings outside clinic are within normal limits.  Reviewed last 6 BP readings in chart:  BP Readings from Last 6 Encounters:   12/11/19 136/82   08/14/19 139/66   08/13/19 (!) 140/70   08/05/19 128/80   08/03/19 (!) 159/74   06/12/19 144/74     He has not experienced any significant side effects from medicaiotns for hypertension.    NO active cardiac complaints or symptoms with exercise.     3.  The patient has a history of impaired glucose tolerance with regularly elevated blood sugars.    They have not been diagnosed with type II DM or placed on medications for diabetes before.   They deny polyuria, polydipsia.     The patient is obese with a BMI of Body mass index is 39.13 kg/m ..    Review of current labs show:    Lab Results   Component Value Date    A1C 5.5 12/11/2019    A1C 6.3 08/03/2019     @bgl@     I reviewed the information recorded in the patient's EPIC chart (including but not limited to medical history, surgical history, problem list, medication list, and allergy list) and updated information as needed.         Reviewed and updated as needed this visit by Provider         Review of Systems   Constitutional, HEENT, cardiovascular, pulmonary, gi and gu systems are negative, except as otherwise noted.       Objective   Reported vitals:  Wt 120.2 kg (265 lb)   BMI 39.13 kg/m     healthy, alert and no distress  PSYCH: Alert and oriented times 3; coherent speech, normal   rate and volume, able to articulate logical thoughts, able   to abstract reason, no tangential thoughts, no hallucinations   or delusions  His affect is normal  RESP: No cough, no audible wheezing, able to talk in full sentences  Remainder of exam unable to be completed due to telephone visits            Assessment/Plan:      (R05) Cough  (primary encounter diagnosis)  Comment:  Patient has some symptoms that could possibly be caused by mild COVID-19 infection.  Given his multiple risk factors and possible previous exposure, COVID-19 testing is reasonable.  Order placed, told him central scheduling will contact him about the results  Discussed basics of COVID-19 including diagnosis, treatment, expected recovery.  Asked the patient to report the emergency room immediately if he develops any significant serious worsening respiratory troubles or other symptoms.  Plan: Symptomatic COVID-19 Virus (Coronavirus) by PCR            (R68.83) Chills  Comment: As above.  Plan: Symptomatic COVID-19 Virus (Coronavirus) by PCR            (R73.03) Prediabetes  Comment: Reviewed previous chart, he is trying to work on diet.  He due for lab recheck this summer.  Plan:       (I10) Essential hypertension, benign  Comment: Stable, continue same medicines.  Follow-up with his regular primary MD this month.  Plan:           Return in about 4 weeks (around 7/3/2020) for with your primary MD for Diabetes, Blood pressure.      Phone call duration:  12:30 minutes    Jose Meléndez M.D.  Dept. of Internal Medicine  [facility]

## 2020-06-10 ENCOUNTER — TELEPHONE (OUTPATIENT)
Dept: FAMILY MEDICINE | Facility: CLINIC | Age: 65
End: 2020-06-10

## 2020-06-10 DIAGNOSIS — R05.9 COUGH: ICD-10-CM

## 2020-06-10 DIAGNOSIS — R68.83 CHILLS: ICD-10-CM

## 2020-06-10 LAB
SARS-COV-2 RNA SPEC QL NAA+PROBE: NOT DETECTED
SPECIMEN SOURCE: NORMAL

## 2020-06-10 PROCEDURE — 99000 SPECIMEN HANDLING OFFICE-LAB: CPT | Performed by: INTERNAL MEDICINE

## 2020-06-10 PROCEDURE — U0003 INFECTIOUS AGENT DETECTION BY NUCLEIC ACID (DNA OR RNA); SEVERE ACUTE RESPIRATORY SYNDROME CORONAVIRUS 2 (SARS-COV-2) (CORONAVIRUS DISEASE [COVID-19]), AMPLIFIED PROBE TECHNIQUE, MAKING USE OF HIGH THROUGHPUT TECHNOLOGIES AS DESCRIBED BY CMS-2020-01-R: HCPCS | Mod: 90 | Performed by: INTERNAL MEDICINE

## 2020-06-10 PROCEDURE — 99207 ZZC NO BILLABLE SERVICE THIS VISIT: CPT | Performed by: INTERNAL MEDICINE

## 2020-06-10 NOTE — LETTER
June 11, 2020        Brian Flynn  [id number]  [facility] [address]    This letter provides a written record that you were tested for COVID-19 on 6/10/20.   Your result was negative.    This means that we didn t find the virus that causes COVID-19 in your sample. A test may show negative when you do actually have the virus. This can happen when the virus is in the early stages of infection, before you feel illness symptoms.    Even if you don t have symptoms, they may still appear. For safety, it s very important to follow these rules.    Keep yourself away from others (self-isolation):      Stay home. Don t go to work, school or anywhere else.     Stay in your own room (and use your own bathroom), if you can.    Stay away from others in your home. No hugging, kissing or shaking hands. No visitors.    Clean  high touch  surfaces often (doorknobs, counters, handles, etc.). Use a household cleaning spray or wipes.    Cover your mouth and nose with a mask, tissue or washcloth to avoid spreading germs.    Wash your hands and face often with soap and water.    Stay in self-isolation until you meet ALL of the guidelines below:    1. You have had no fever for at least 72 hours (that is 3 full days of no fever without the use of medicine that reduces fevers), AND  2. other symptoms (such as cough, shortness of breath) have gotten better, AND  3. at least 10 days have passed since your symptoms first appeared.    Going back to work  Check with your employer for any guidelines to follow for going back to work.    Employers: This document serves as formal notice that your employee tested negative for COVID-19, as of the testing date shown above.    For questions regarding this letter or your Negative COVID-19 result, call [phone] between 8A to 6:30P (M-F) and 10A to 6:30P (weekends).

## 2020-06-10 NOTE — LETTER
Amara 10, 2020      Brian Flynn  [address]  [facility] [address]        Dear ,    We are writing to inform you of your test results.    Your test results show no evidence foe Covid-19 infection.      Resulted Orders   Symptomatic COVID-19 Virus (Coronavirus) by PCR   Result Value Ref Range    COVID-19 Virus PCR to [facility] - Source Nasopharyngeal     COVID-19 Virus PCR to [facility] - Result Not Detected       Comment:      Collection of multiple specimens from the same patient may be necessary to   detect the virus. The possibility of a false negative should be considered if   the patient's recent exposure or clinical presentation suggests 2019 nCOV   infection and diagnostic tests for other causes of illness are negative.   Repeat testing may be considered in this setting.  Viral RNA was extracted via a validated method and subsequently underwent   single step reverse transcriptase-real time polymerase chain reaction using   primers to the CDC specified N1,N2 gene targets of CoV2 and human RNP as an   internal control.  A negative result does not rule out the presence of real-time PCR inhibitors   in the specimen or COVID-19 RNA in concentrations below the limit of detection   of the assay. The possibility of a false negative should be considered if the   patients recent exposure or clinical presentation suggests COVID-19.   Additional testing or repeat testing requires consultation with the   laborator  y.  Nasopharyngeal specimen is the preferred choice for swab-based SARS CoV2   testing. When collection of a nasopharyngeal swab is not possible the   following are acceptable alternatives:  an oropharyngeal (OP) specimen collected by a healthcare professional, or a   nasal mid-turbinate (NMT) swab collected by a healthcare professional or by   onsite self-collection (using a flocked tapered swab), or an anterior nares   specimen collected by a healthcare professional or by onsite  self-collection   (using a round foam swab). (Centers for Disease Control)  Testing performed by [facility], Room [address], [address] [place] [address], [place] [address]. This test was developed and its   performance characteristics determined by the [facility]. It has not been cleared or approved by the FDA.  The laboratory is regulated under the Clinical Laboratory Improvement   Amendments of 1988 (CLIA-88) as qualified to perform high-complexity testin  g.   This test is used for clinical purposes. It should not be regarded as   investigational or for research.         If you have any questions or concerns, please call the clinic at the number listed above.       Sincerely,         Jose Meléndez M.D.  Dept. of Internal Medicine  [facility]

## 2020-06-11 NOTE — TELEPHONE ENCOUNTER
Please call the patient (and his wife at the same time, see separate phone encounter for her).    His Covid-19 test was negative.      Mail the result letter to him.

## 2020-06-29 ENCOUNTER — VIRTUAL VISIT (OUTPATIENT)
Dept: FAMILY MEDICINE | Facility: CLINIC | Age: 65
End: 2020-06-29
Payer: COMMERCIAL

## 2020-06-29 VITALS — DIASTOLIC BLOOD PRESSURE: 61 MMHG | SYSTOLIC BLOOD PRESSURE: 111 MMHG

## 2020-06-29 DIAGNOSIS — E66.01 MORBID OBESITY (H): Primary | ICD-10-CM

## 2020-06-29 DIAGNOSIS — I10 ESSENTIAL HYPERTENSION, BENIGN: ICD-10-CM

## 2020-06-29 DIAGNOSIS — E78.5 HYPERLIPIDEMIA LDL GOAL <130: ICD-10-CM

## 2020-06-29 PROCEDURE — 99213 OFFICE O/P EST LOW 20 MIN: CPT | Mod: 95 | Performed by: FAMILY MEDICINE

## 2020-06-29 RX ORDER — AMLODIPINE BESYLATE 10 MG/1
10 TABLET ORAL DAILY
Qty: 90 TABLET | Refills: 1 | Status: SHIPPED | OUTPATIENT
Start: 2020-06-29 | End: 2020-09-15

## 2020-06-29 RX ORDER — LISINOPRIL AND HYDROCHLOROTHIAZIDE 20; 25 MG/1; MG/1
1 TABLET ORAL DAILY
Qty: 90 TABLET | Refills: 1 | Status: SHIPPED | OUTPATIENT
Start: 2020-06-29 | End: 2020-11-11

## 2020-06-29 RX ORDER — METOPROLOL SUCCINATE 50 MG/1
50 TABLET, EXTENDED RELEASE ORAL DAILY
Qty: 90 TABLET | Refills: 1 | Status: SHIPPED | OUTPATIENT
Start: 2020-06-29 | End: 2020-11-11

## 2020-06-29 NOTE — PROGRESS NOTES
"Brian Flynn is a 65 year old male who is being evaluated via a billable telephone visit.      The patient has been notified of following:     \"This telephone visit will be conducted via a call between you and your physician/provider. We have found that certain health care needs can be provided without the need for a physical exam.  This service lets us provide the care you need with a short phone conversation.  If a prescription is necessary we can send it directly to your pharmacy.  If lab work is needed we can place an order for that and you can then stop by our lab to have the test done at a later time.    Telephone visits are billed at different rates depending on your insurance coverage. During this emergency period, for some insurers they may be billed the same as an in-person visit.  Please reach out to your insurance provider with any questions.    If during the course of the call the physician/provider feels a telephone visit is not appropriate, you will not be charged for this service.\"    Patient has given verbal consent for Telephone visit?  Yes    What phone number would you like to be contacted at? [phone]    How would you like to obtain your AVS? Mail a copy    Subjective     Brian Flynn is a 65 year old male who presents via phone visit today for the following health issues:    HPI  Hypertension Follow-up      Do you check your blood pressure regularly outside of the clinic? Yes     Are you following a low salt diet? Yes    Are your blood pressures ever more than 140 on the top number (systolic) OR more   than 90 on the bottom number (diastolic), for example 140/90? No      How many servings of fruits and vegetables do you eat daily?  2-3    On average, how many sweetened beverages do you drink each day (Examples: soda, juice, sweet tea, etc.  Do NOT count diet or artificially sweetened beverages)?   1    How many days per week do you exercise enough to make your heart beat faster? 3 " or less    How many minutes a day do you exercise enough to make your heart beat faster? 9 or less    How many days per week do you miss taking your medication? 0                Reviewed and updated as needed this visit by Provider         Review of Systems   Constitutional, HEENT, cardiovascular, pulmonary, gi and gu systems are negative, except as otherwise noted.       Objective   Reported vitals:  There were no vitals taken for this visit.   healthy, alert and no distress  PSYCH: Alert and oriented times 3; coherent speech, normal   rate and volume, able to articulate logical thoughts, able   to abstract reason, no tangential thoughts, no hallucinations   or delusions  His affect is normal  RESP: No cough, no audible wheezing, able to talk in full sentences  Remainder of exam unable to be completed due to telephone visits    Diagnostic Test Results:  Labs reviewed in Epic        Assessment/Plan:  1. Essential hypertension, benign  Labs ordered.  Once done we will follow-up on that.  I will also refill his medication.  He will get to the clinic and we will check his blood pressure as well as nurse only.  - amLODIPine (NORVASC) 10 MG tablet; Take 1 tablet (10 mg) by mouth daily  Dispense: 90 tablet; Refill: 1  - lisinopril-hydrochlorothiazide (ZESTORETIC) 20-25 MG tablet; Take 1 tablet by mouth daily  Dispense: 90 tablet; Refill: 1  - metoprolol succinate ER (TOPROL-XL) 50 MG 24 hr tablet; Take 1 tablet (50 mg) by mouth daily  Dispense: 90 tablet; Refill: 1  - Comprehensive metabolic panel; Future    2. Morbid obesity (H)      3. Hyperlipidemia LDL goal <130    - Comprehensive metabolic panel; Future  - Lipid panel reflex to direct LDL Fasting; Future        Phone call duration:  7 minutes

## 2020-07-29 ENCOUNTER — ALLIED HEALTH/NURSE VISIT (OUTPATIENT)
Dept: NURSING | Facility: CLINIC | Age: 65
End: 2020-07-29
Payer: COMMERCIAL

## 2020-07-29 VITALS
HEART RATE: 84 BPM | BODY MASS INDEX: 42.35 KG/M2 | DIASTOLIC BLOOD PRESSURE: 62 MMHG | WEIGHT: 286.8 LBS | SYSTOLIC BLOOD PRESSURE: 160 MMHG

## 2020-07-29 DIAGNOSIS — I10 ESSENTIAL HYPERTENSION, BENIGN: ICD-10-CM

## 2020-07-29 DIAGNOSIS — R73.03 PREDIABETES: Primary | ICD-10-CM

## 2020-07-29 DIAGNOSIS — I10 ESSENTIAL HYPERTENSION, BENIGN: Primary | ICD-10-CM

## 2020-07-29 DIAGNOSIS — E78.5 HYPERLIPIDEMIA LDL GOAL <130: ICD-10-CM

## 2020-07-29 LAB
ALBUMIN SERPL-MCNC: 3.5 G/DL (ref 3.4–5)
ALP SERPL-CCNC: 108 U/L (ref 40–150)
ALT SERPL W P-5'-P-CCNC: 46 U/L (ref 0–70)
ANION GAP SERPL CALCULATED.3IONS-SCNC: 4 MMOL/L (ref 3–14)
AST SERPL W P-5'-P-CCNC: 23 U/L (ref 0–45)
BILIRUB SERPL-MCNC: 1.3 MG/DL (ref 0.2–1.3)
BUN SERPL-MCNC: 24 MG/DL (ref 7–30)
CALCIUM SERPL-MCNC: 9 MG/DL (ref 8.5–10.1)
CHLORIDE SERPL-SCNC: 106 MMOL/L (ref 94–109)
CHOLEST SERPL-MCNC: 131 MG/DL
CO2 SERPL-SCNC: 29 MMOL/L (ref 20–32)
CREAT SERPL-MCNC: 1.24 MG/DL (ref 0.66–1.25)
GFR SERPL CREATININE-BSD FRML MDRD: 60 ML/MIN/{1.73_M2}
GLUCOSE SERPL-MCNC: 198 MG/DL (ref 70–99)
HDLC SERPL-MCNC: 34 MG/DL
LDLC SERPL CALC-MCNC: 66 MG/DL
NONHDLC SERPL-MCNC: 97 MG/DL
POTASSIUM SERPL-SCNC: 4.6 MMOL/L (ref 3.4–5.3)
PROT SERPL-MCNC: 8.2 G/DL (ref 6.8–8.8)
SODIUM SERPL-SCNC: 139 MMOL/L (ref 133–144)
TRIGL SERPL-MCNC: 155 MG/DL

## 2020-07-29 PROCEDURE — 99207 ZZC NO CHARGE NURSE ONLY: CPT

## 2020-07-29 PROCEDURE — 80061 LIPID PANEL: CPT | Performed by: FAMILY MEDICINE

## 2020-07-29 PROCEDURE — 80053 COMPREHEN METABOLIC PANEL: CPT | Performed by: FAMILY MEDICINE

## 2020-07-29 PROCEDURE — 36415 COLL VENOUS BLD VENIPUNCTURE: CPT | Performed by: FAMILY MEDICINE

## 2020-07-29 NOTE — Clinical Note
Please see encounter notes. Blood pressure elevated today. Reports white coat syndrome. States that he is taking medications as prescribed. Caitlin Jacome RN

## 2020-07-29 NOTE — PROGRESS NOTES
Brian Flynn is a 65 year old year old patient who comes in today for a Blood Pressure check because of ongoing blood pressure monitoring.  Vital Signs as repeated by WILLIE Jacome RN  168/70 right arm, 160/62  Patient is taking medication as prescribed  Patient is tolerating medications well.  Patient is monitoring Blood Pressure at home.  Average readings if yes are 120/60's in the left arm. Reports always higher in the right arm. Patient reports that uses wrist cuff at home. States that he has brought it in for comparison and it was good.  Also reports white coat syndrome.  Current complaints: feet feel numb. Did not mention during virtual visit. Left is greater than right.  Disposition:  cc chart to Dr. Emigdio Jacome RN

## 2020-07-29 NOTE — PROGRESS NOTES
Left message to schedule BP check appointment.    .Lena LOPES    [facility] Kelly Sargent

## 2020-07-30 DIAGNOSIS — I10 ESSENTIAL HYPERTENSION, BENIGN: ICD-10-CM

## 2020-07-30 DIAGNOSIS — R73.03 PREDIABETES: Primary | ICD-10-CM

## 2020-07-30 DIAGNOSIS — E78.5 HYPERLIPIDEMIA LDL GOAL <130: ICD-10-CM

## 2020-07-30 RX ORDER — ATORVASTATIN CALCIUM 20 MG/1
20 TABLET, FILM COATED ORAL DAILY
Qty: 90 TABLET | Refills: 3 | Status: SHIPPED | OUTPATIENT
Start: 2020-07-30 | End: 2020-11-11

## 2020-07-31 ENCOUNTER — OFFICE VISIT (OUTPATIENT)
Dept: FAMILY MEDICINE | Facility: CLINIC | Age: 65
End: 2020-07-31
Payer: COMMERCIAL

## 2020-07-31 VITALS
HEART RATE: 89 BPM | HEIGHT: 69 IN | OXYGEN SATURATION: 97 % | DIASTOLIC BLOOD PRESSURE: 77 MMHG | SYSTOLIC BLOOD PRESSURE: 166 MMHG | TEMPERATURE: 98.6 F | BODY MASS INDEX: 42.06 KG/M2 | WEIGHT: 284 LBS

## 2020-07-31 DIAGNOSIS — L03.90 CELLULITIS, UNSPECIFIED CELLULITIS SITE: Primary | ICD-10-CM

## 2020-07-31 PROCEDURE — 99214 OFFICE O/P EST MOD 30 MIN: CPT | Performed by: FAMILY MEDICINE

## 2020-07-31 RX ORDER — CEPHALEXIN 500 MG/1
500 CAPSULE ORAL 3 TIMES DAILY
Qty: 21 CAPSULE | Refills: 0 | Status: SHIPPED | OUTPATIENT
Start: 2020-07-31 | End: 2020-08-10

## 2020-07-31 RX ORDER — SULFAMETHOXAZOLE/TRIMETHOPRIM 800-160 MG
1 TABLET ORAL 2 TIMES DAILY
Qty: 14 TABLET | Refills: 0 | Status: SHIPPED | OUTPATIENT
Start: 2020-07-31 | End: 2020-08-10

## 2020-07-31 ASSESSMENT — MIFFLIN-ST. JEOR: SCORE: 2058.6

## 2020-07-31 NOTE — PROGRESS NOTES
"Subjective     Brian Flynn is a 65 year old male who presents to clinic today for the following health issues:    HPI       Concern - Laceration/scrape   Onset: x Monday     Description:   Fell/slipped on the stair of his deck and scrapped his left leg     Intensity: mild    Progression of Symptoms:  same    Accompanying Signs & Symptoms: redness and swelling     Accidentally had little fall where he scraped his back of his leg.  He noticed small area of redness which is increasing.  Denies any fever chills.  In the past he has similar thing which lasted some time when he ended up getting antibiotics.  No other concerns.  Patient blood pressure is always elevated.  He checked his blood pressure at home which was normal.        Reviewed and updated as needed this visit by Provider         Review of Systems   Constitutional, HEENT, cardiovascular, pulmonary, gi and gu systems are negative, except as otherwise noted.      Objective    BP (!) 166/77   Pulse 89   Temp 98.6  F (37  C) (Tympanic)   Ht 1.753 m (5' 9\")   Wt 128.8 kg (284 lb)   SpO2 97%   BMI 41.94 kg/m    Body mass index is 41.94 kg/m .  Physical Exam   GENERAL: healthy, alert and no distress  RESP: lungs clear to auscultation - no rales, rhonchi or wheezes  The back of the leg is circular around almost 4 x 4 centimeter with some central area of erythema.  Slight warm to touch.        Assessment & Plan     1. Cellulitis, unspecified cellulitis site  Patient has area of cellulitis behind the leg .  It is slight erythematous and warm to touch.  Will start him on Bactrim and Keflex.  Advised to keep it clean I will see him back in 1 week for recheck.  His blood pressure is elevated this is always elevated when he comes to the office.  He is advised to bring his medication  So that we can review, he is already on near max dose of his blood pressure medications.  - sulfamethoxazole-trimethoprim (BACTRIM DS) 800-160 MG tablet; Take 1 tablet by mouth 2 " "times daily for 7 days  Dispense: 14 tablet; Refill: 0  - cephALEXin (KEFLEX) 500 MG capsule; Take 1 capsule (500 mg) by mouth 3 times daily for 7 days  Dispense: 21 capsule; Refill: 0     BMI:   Estimated body mass index is 41.94 kg/m  as calculated from the following:    Height as of this encounter: 1.753 m (5' 9\").    Weight as of this encounter: 128.8 kg (284 lb).     Kirill Beal MD  [facility]    "

## 2020-08-10 ENCOUNTER — OFFICE VISIT (OUTPATIENT)
Dept: FAMILY MEDICINE | Facility: CLINIC | Age: 65
End: 2020-08-10
Payer: COMMERCIAL

## 2020-08-10 VITALS
BODY MASS INDEX: 41.03 KG/M2 | HEIGHT: 69 IN | WEIGHT: 277 LBS | OXYGEN SATURATION: 98 % | DIASTOLIC BLOOD PRESSURE: 71 MMHG | TEMPERATURE: 97.1 F | SYSTOLIC BLOOD PRESSURE: 156 MMHG | HEART RATE: 76 BPM

## 2020-08-10 DIAGNOSIS — L03.119 CELLULITIS OF LOWER EXTREMITY, UNSPECIFIED LATERALITY: ICD-10-CM

## 2020-08-10 DIAGNOSIS — R73.9 ELEVATED BLOOD SUGAR: Primary | ICD-10-CM

## 2020-08-10 DIAGNOSIS — E11.69 TYPE 2 DIABETES MELLITUS WITH OTHER SPECIFIED COMPLICATION, WITHOUT LONG-TERM CURRENT USE OF INSULIN (H): ICD-10-CM

## 2020-08-10 PROBLEM — E11.9 DIABETES MELLITUS, TYPE 2 (H): Status: ACTIVE | Noted: 2020-08-10

## 2020-08-10 LAB — HBA1C MFR BLD: 7.3 % (ref 0–5.6)

## 2020-08-10 PROCEDURE — 36415 COLL VENOUS BLD VENIPUNCTURE: CPT | Performed by: FAMILY MEDICINE

## 2020-08-10 PROCEDURE — 99214 OFFICE O/P EST MOD 30 MIN: CPT | Performed by: FAMILY MEDICINE

## 2020-08-10 PROCEDURE — 83036 HEMOGLOBIN GLYCOSYLATED A1C: CPT | Performed by: FAMILY MEDICINE

## 2020-08-10 ASSESSMENT — MIFFLIN-ST. JEOR: SCORE: 2026.84

## 2020-08-10 NOTE — PROGRESS NOTES
Subjective     Brian Flynn is a 65 year old male who presents to clinic today for the following health issues:    HPI       Concern - Recheck on cellulitis   Patient had cellulitis on the back of the leg.  He was started on antibiotic and since being on antibiotic is redness erythema has significantly improved.  He denies any fever chills.    Patient in the past lab was noted to have elevated blood glucose.  He has never been diagnosed with diabetes.  His A1c in the past was within normal range.  Denies any new concerns.  His lifestyle is somewhat sedentary.            Reviewed and updated as needed this visit by Provider         Review of Systems   Constitutional, HEENT, cardiovascular, pulmonary, gi and gu systems are negative, except as otherwise noted.      Objective    There were no vitals taken for this visit.  There is no height or weight on file to calculate BMI.  Physical Exam   GENERAL: healthy, alert and no distress  NECK: no adenopathy, no asymmetry, masses, or scars and thyroid normal to palpation  RESP: lungs clear to auscultation - no rales, rhonchi or wheezes  CV: regular rate and rhythm, normal S1 S2, no S3 or S4, no murmur, click or rub, no peripheral edema and peripheral pulses strong  Area of erythema redness on the back of the leg is much improved.  Scab formation is seen there is no surrounding redness.              Assessment & Plan     1. Elevated blood sugar    - Hemoglobin A1c    2. Cellulitis of lower extremity, unspecified laterality  Patient cellulitis is improving.  He is advised to keep it clean follow-up if any change.    3. Type 2 diabetes mellitus with other specified complication, without long-term current use of insulin (H)  Patient fasting blood sugars were elevated his hemoglobin A1c is checked which is 7.3 which put him in nondiabetic range.  Patient has underlying high blood pressure cholesterol and angina issues.  Discussed with him he needs to be on blood sugar  "lowering medication metformin.  Side effects were discussed.  He will follow-up in 3 months for recheck.  Losing weight eating healthy will help as well.  - metFORMIN (GLUCOPHAGE) 500 MG tablet; Take 1 tablet (500 mg) by mouth 2 times daily (with meals)  Dispense: 180 tablet; Refill: 1     BMI:   Estimated body mass index is 40.91 kg/m  as calculated from the following:    Height as of this encounter: 1.753 m (5' 9\").    Weight as of this encounter: 125.6 kg (277 lb).     Kirill Beal MD  [facility]    "

## 2020-09-15 ENCOUNTER — OFFICE VISIT (OUTPATIENT)
Dept: FAMILY MEDICINE | Facility: CLINIC | Age: 65
End: 2020-09-15
Payer: COMMERCIAL

## 2020-09-15 ENCOUNTER — TELEPHONE (OUTPATIENT)
Dept: WOUND CARE | Facility: CLINIC | Age: 65
End: 2020-09-15

## 2020-09-15 VITALS
TEMPERATURE: 97.4 F | SYSTOLIC BLOOD PRESSURE: 134 MMHG | DIASTOLIC BLOOD PRESSURE: 62 MMHG | HEIGHT: 69 IN | HEART RATE: 82 BPM | WEIGHT: 274 LBS | OXYGEN SATURATION: 97 % | BODY MASS INDEX: 40.58 KG/M2

## 2020-09-15 DIAGNOSIS — R60.0 LOWER EXTREMITY EDEMA: ICD-10-CM

## 2020-09-15 DIAGNOSIS — I10 ESSENTIAL HYPERTENSION, BENIGN: ICD-10-CM

## 2020-09-15 DIAGNOSIS — S81.802S WOUND OF LEFT LOWER EXTREMITY, SEQUELA: Primary | ICD-10-CM

## 2020-09-15 LAB
ERYTHROCYTE [DISTWIDTH] IN BLOOD BY AUTOMATED COUNT: 13.4 % (ref 10–15)
HCT VFR BLD AUTO: 40.9 % (ref 40–53)
HGB BLD-MCNC: 14 G/DL (ref 13.3–17.7)
MCH RBC QN AUTO: 30 PG (ref 26.5–33)
MCHC RBC AUTO-ENTMCNC: 34.2 G/DL (ref 31.5–36.5)
MCV RBC AUTO: 88 FL (ref 78–100)
PLATELET # BLD AUTO: 202 10E9/L (ref 150–450)
RBC # BLD AUTO: 4.66 10E12/L (ref 4.4–5.9)
WBC # BLD AUTO: 7.8 10E9/L (ref 4–11)

## 2020-09-15 PROCEDURE — 85027 COMPLETE CBC AUTOMATED: CPT | Performed by: FAMILY MEDICINE

## 2020-09-15 PROCEDURE — 99214 OFFICE O/P EST MOD 30 MIN: CPT | Performed by: FAMILY MEDICINE

## 2020-09-15 PROCEDURE — 36415 COLL VENOUS BLD VENIPUNCTURE: CPT | Performed by: FAMILY MEDICINE

## 2020-09-15 RX ORDER — DOXYCYCLINE 100 MG/1
100 CAPSULE ORAL 2 TIMES DAILY
Qty: 14 CAPSULE | Refills: 0 | Status: SHIPPED | OUTPATIENT
Start: 2020-09-15 | End: 2020-09-22

## 2020-09-15 RX ORDER — AMLODIPINE BESYLATE 5 MG/1
5 TABLET ORAL DAILY
Qty: 90 TABLET | Refills: 1 | Status: SHIPPED | OUTPATIENT
Start: 2020-09-15 | End: 2020-11-11

## 2020-09-15 RX ORDER — AMLODIPINE BESYLATE 10 MG/1
10 TABLET ORAL DAILY
Qty: 90 TABLET | Refills: 1 | Status: SHIPPED | OUTPATIENT
Start: 2020-09-15 | End: 2020-09-15

## 2020-09-15 ASSESSMENT — MIFFLIN-ST. JEOR: SCORE: 2013.24

## 2020-09-15 NOTE — TELEPHONE ENCOUNTER
Pictures of Left posterior lower leg wound received. Ok to schedule with Mami or Dr. Cervantes as able.   Routing to scheduling pool

## 2020-09-15 NOTE — PROGRESS NOTES
Subjective     Brian Flynn is a 65 year old male who presents to clinic today for the following health issues:    HPI       Concern - wound check/Cellulitis    Onset: recheck  From last office visit   Description: back of left leg   Intensity: mild, moderate  Progression of Symptoms:  same  Accompanying Signs & Symptoms:   Previous history of similar problem:   Precipitating factors:        Worsened by:   Alleviating factors:        Improved by:   Therapies tried and outcome:  none   Patient initially presented almost a month a month ago after he had accidentally hit the back of his leg on the deck caused some wound.  It was partially infected so he was started on antibiotic he responded very well to that.  He noticed some improvement but now he feels his wound is still scabbing and sometimes scab come off and is not healing very appropriately.  Denies any fever chills overall feels good.    Patient has high blood pressure currently on amlodipine 10 mg along with lisinopril hydrochlorothiazide and metoprolol.  Denies any calf tenderness.      Review of Systems   Constitutional, HEENT, cardiovascular, pulmonary, gi and gu systems are negative, except as otherwise noted.      Objective    There were no vitals taken for this visit.  There is no height or weight on file to calculate BMI.  Physical Exam   GENERAL: healthy, alert and no distress  NECK: no adenopathy, no asymmetry, masses, or scars and thyroid normal to palpation  RESP: lungs clear to auscultation - no rales, rhonchi or wheezes  CV: regular rate and rhythm, normal S1 S2, no S3 or S4, no murmur, click or rub, no peripheral edema and peripheral pulses strong  The back of left leg.  There is no obvious discharge other than he has 2+ pitting edema in that leg.  Not warm to touch slight scabbing and erythematous area noted.            Assessment & Plan     Wound of left lower extremity, sequela  Doing ongoing slight nonhealing wounds I suggested to see a  "wound care to see if there is any other evaluation needs to be done.  I will start him on doxycycline to cover for any facial infection.  - WOUND CARE REFERRAL; Future  - doxycycline hyclate (VIBRAMYCIN) 100 MG capsule; Take 1 capsule (100 mg) by mouth 2 times daily for 7 days    Lower extremity edema  Lower extremity edema this could be vascular issue as well ,suggested lymphedema wrap to see if that helps.  - PHYSICAL THERAPY REFERRAL; Future  - CBC with platelets  - amLODIPine (NORVASC) 5 MG tablet; Take 1 tablet (5 mg) by mouth daily    Essential hypertension, benign  Patient blood pressure is stable.  Other medication which can cause leg edema on Norvasc.  We suggested to give a trial of decreasing the dose to 5 mg.  5 mg prescription is sent.  He will check his blood pressure if blood pressure is raising with decreasing the dose he can go back to original dose but he will let us know.       BMI:   Estimated body mass index is 40.46 kg/m  as calculated from the following:    Height as of this encounter: 1.753 m (5' 9\").    Weight as of this encounter: 124.3 kg (274 lb).     Kirill Beal MD  [facility] [place]    "

## 2020-09-16 ENCOUNTER — HOSPITAL ENCOUNTER (OUTPATIENT)
Dept: WOUND CARE | Facility: CLINIC | Age: 65
Discharge: HOME | End: 2020-09-16
Attending: PHYSICIAN ASSISTANT | Admitting: PHYSICIAN ASSISTANT
Payer: COMMERCIAL

## 2020-09-16 VITALS
TEMPERATURE: 97.3 F | RESPIRATION RATE: 20 BRPM | SYSTOLIC BLOOD PRESSURE: 151 MMHG | HEIGHT: 69 IN | BODY MASS INDEX: 40.58 KG/M2 | HEART RATE: 84 BPM | DIASTOLIC BLOOD PRESSURE: 69 MMHG | WEIGHT: 274 LBS

## 2020-09-16 DIAGNOSIS — S81.802S WOUND OF LEFT LOWER EXTREMITY, SEQUELA: ICD-10-CM

## 2020-09-16 DIAGNOSIS — S81.802A OPEN WOUND OF LOWER LIMB, LEFT, INITIAL ENCOUNTER: ICD-10-CM

## 2020-09-16 DIAGNOSIS — I73.9 PERIPHERAL VASCULAR DISEASE, UNSPECIFIED (H): ICD-10-CM

## 2020-09-16 PROCEDURE — 97597 DBRDMT OPN WND 1ST 20 CM/<: CPT | Performed by: PHYSICIAN ASSISTANT

## 2020-09-16 PROCEDURE — 97597 DBRDMT OPN WND 1ST 20 CM/<: CPT

## 2020-09-16 PROCEDURE — 99203 OFFICE O/P NEW LOW 30 MIN: CPT | Mod: 25 | Performed by: PHYSICIAN ASSISTANT

## 2020-09-16 PROCEDURE — G0463 HOSPITAL OUTPT CLINIC VISIT: HCPCS | Mod: 25

## 2020-09-16 RX ORDER — AMLODIPINE BESYLATE 10 MG/1
TABLET ORAL
COMMUNITY
Start: 2020-07-31 | End: 2020-09-16

## 2020-09-16 ASSESSMENT — MIFFLIN-ST. JEOR: SCORE: 2013.49

## 2020-09-16 NOTE — DISCHARGE INSTRUCTIONS
[facility]  [address] [facility] [address], [place] [address]    Call us at [phone] if you have any questions about your wounds, have redness or swelling around your wound, have a fever of 101 or greater or if you have any other problems or concerns. We answer the phone Monday through Friday 8 am to 4 pm, please leave a message as we check the voicemail frequently throughout the day.     Brian Flynn      1955    Medications/supplements to aid in healin. 1 packet of Benjy Supplement into your favorite beverage twice a day. Purchase at [facility] in [address]  2. Vitamin D3 5,000 iu per day.  3. Vitamin C 2,000 mg daily  4. Folate 1000 mcg daily   5. Vitamin B 12 1000 mcg daily    A diet high in protein is important for wound healing, we recommend getting 90 grams of protein per day. Taking protein shakes or bars are a good way to get extra protein in your diet.  Other good sources of protein:  Pork 26g per 3 oz  Whey protein powder - 24g per scoop (on average)  Greek yogurt - 23g per 8oz   Chicken or Turkey - 23g per 3oz  Fish - 20-25g per 3oz  Beef - 18-23g per 3oz  Navy beans - 20g per cup  Cottage cheese - 14g per 1/2 cup   Lentils - 13g per 1/4 cup  Beef jerky 13g per 1oz  2% milk - 8g per cup  Peanut butter - 8g per 2 tablespoons  Eggs - 6g per egg  Mixed nuts - 6g per 2oz     Wound care recommendations to Left Lower Leg ulcer  1. Clean with soap and water, pat dry  2. Apply Aquacel AG size to fit the wound  3. Apply Edema wear from toes to knee- see below for directions  Change Daily    Remove compression dressing if toes turn blue and/or start to feel numb and is not relieved by elevating the leg for one hour.   Please raise your legs above your heart for 30 mins 3 times a day to promote wound healing.     Please call [facility] [phone] ([phone] [place]) to schedule your NANDINI appointment if you  have not heard from them in two business days.  Arrive 15 minutes early.     Diamante Perez PA-C. September 16, 2020    Wound Clinc follow up as scheduled

## 2020-09-16 NOTE — PROGRESS NOTES
Patient arrived for wound care visit. Certified Wound Care Nurse time spent evaluating patient record, completed a full evaluation and documented wound(s) & marco antonio-wound skin; provided recommendation based on treatment plan. Applied dressing, reviewed discharge instructions, patient education, and discussed plan of care with appropriate medical team staff members and patient and/or family members.

## 2020-09-16 NOTE — PROGRESS NOTES
[facility]    ASSESSMENT:   1. Full thickness left lower leg ulcer with fat-layer exposed secondary to trauma, cellulitis and lymphedema  2. Lymphedema  3. Absent pedal pulses  4. Type 2 DM  5. Diabetic peripheral neuropathy  6. Adherence to plan of care -unknown as this is initial assessment    PLAN/DISCUSSION:   1. Wound care plan: AquaCel Ag and EdemaWear    HISTORY OF PRESENT ILLNESS:   Brian Flynn is a 65 year old male who presents today for a left lower leg wound. Scraped his leg back in July. Developed cellulitis, has been on two round of antibiotics. It seems to scab and then break open. Newly diagnosed type 2 DM on metformin. Some peripheral neuropathy. Chronic peripheral edema.  Unable to tolerate compression stockings as he has difficulty donning these and has developed skin breakdown from these in the past.     CURRENT/PREVIOUS DRESSING: aquaphor only     COMPRESSION/OFFLOADING: none    DATE WOUND ACQUIRED: ~7/2020    REVIEW OF SYSTEMS:  CONSTITUTIONAL: Denies fevers or acute illness, denies recent weight loss   GI/: denies n/v    PAST MEDICAL HISTORY:   Patient Active Problem List   Diagnosis     Essential hypertension, benign     Redundant prepuce and phimosis     Borderline high cholesterol     CARDIOVASCULAR SCREENING; LDL GOAL LESS THAN 130     Morbid obesity (H)     Prediabetes     Diabetes mellitus, type 2 (H)     Open wound of lower limb, left, initial encounter     SOCIAL HISTORY: retired  TOBACCO STATUS:  reports that he has never smoked. He has never used smokeless tobacco.    MEDICATIONS:   Current Outpatient Medications   Medication     amLODIPine (NORVASC) 5 MG tablet     atorvastatin (LIPITOR) 20 MG tablet     doxycycline hyclate (VIBRAMYCIN) 100 MG capsule     lisinopril-hydrochlorothiazide (ZESTORETIC) 20-25 MG tablet     metFORMIN (GLUCOPHAGE) 500 MG tablet     metoprolol succinate ER (TOPROL-XL) 50 MG 24 hr tablet     No current facility-administered  "medications for this encounter.      VITALS: BP (!) 151/69   Pulse 84   Temp 97.3  F (36.3  C) (Temporal)   Resp 20   Ht 1.753 m (5' 9.02\")   Wt 124.3 kg (274 lb)   BMI 40.44 kg/m       PERTINENT LABS:    Recent Labs   Lab Test 09/15/20  0917 08/10/20  1056 07/29/20  0813   ALBUMIN  --   --  3.5   HGB 14.0  --   --    WBC 7.8  --   --    A1C  --  7.3*  --      PHYSICAL EXAM:  GENERAL: Patient is alert and oriented and in no acute distress  CV: 2+ pitting edema, absent pedal pulses  INTEGUMENTARY:   Wound (used by OP [facility] only) 09/16/20 0842 Left posterior;lower leg trauma (Active)   Length (cm) 3.5 09/16/20 0800   Width (cm) 4.5 09/16/20 0800   Depth (cm) 0 09/16/20 0800   Wound (cm^2) 15.75 cm^2 09/16/20 0800   Wound Volume (cm^3) 0 cm^3 09/16/20 0800   Dressing Appearance dry;open to air 09/16/20 0800   Drainage Characteristics/Odor serosanguineous 09/16/20 0800   Drainage Amount small 09/16/20 0800   Thickness/Stage full thickness 09/16/20 0800   Base scab 09/16/20 0800   Periwound Temperature warm 09/16/20 0800   Care, Wound debrided 09/16/20 0800           PROCEDURE: 4% topical lidocaine was applied to the wound by the nursing staff. Patient was determined to be capable of making their own medical decisions and informed consent was obtained. Using a 15 blade a selective debridement of non-viable tissue was performed of <20 cm. Hemostasis was achieved with pressure. The patient tolerated the procedure well.      FOLLOW-UP: 3 weeks    LILLIAM MCKEON PA-C    "

## 2020-09-29 NOTE — PROGRESS NOTES
[facility]    ASSESSMENT:   1. Healed left lower leg ulcer - secondary to trauma, cellulitis and lymphedema  2. Lymphedema  3. Absent pedal pulses  4. Type 2 DM  5. Diabetic peripheral neuropathy    PLAN/DISCUSSION:   1. Can stop dressings  2. EdemaWear indefinitely  3. Encouraged him to schedule ABIs    HISTORY OF PRESENT ILLNESS:   Brian Flynn is a 65 year old male who returns today for a left lower leg wound. Scraped his leg back in July. Developed cellulitis, has been on two round of antibiotics. It seems to scab and then break open. Newly diagnosed type 2 DM on metformin. Some peripheral neuropathy. Chronic peripheral edema.  Unable to tolerate compression stockings as he has difficulty donning these and has developed skin breakdown from these in the past.     09/30/20: Wound healed today. Has been utilizing AquaCel Ag and EdemaWear since our initial visit. Noted to have absent pedal pulses and NANDINI ordered but he has not completed this yet.    DATE WOUND ACQUIRED: ~7/2020    REVIEW OF SYSTEMS:  CONSTITUTIONAL: Denies fevers or acute illness, denies recent weight loss   GI/: denies n/v    PAST MEDICAL HISTORY:   Patient Active Problem List   Diagnosis     Essential hypertension, benign     Redundant prepuce and phimosis     Borderline high cholesterol     CARDIOVASCULAR SCREENING; LDL GOAL LESS THAN 130     Morbid obesity (H)     Prediabetes     Diabetes mellitus, type 2 (H)     Open wound of lower limb, left, initial encounter     SOCIAL HISTORY: retired  TOBACCO STATUS:  reports that he has never smoked. He has never used smokeless tobacco.    MEDICATIONS:   Current Outpatient Medications   Medication     amLODIPine (NORVASC) 5 MG tablet     atorvastatin (LIPITOR) 20 MG tablet     dorzolamide-timolol (COSOPT) 2-0.5 % ophthalmic solution     lisinopril-hydrochlorothiazide (ZESTORETIC) 20-25 MG tablet     metFORMIN (GLUCOPHAGE) 500 MG tablet     metoprolol succinate ER (TOPROL-XL)  50 MG 24 hr tablet     No current facility-administered medications for this encounter.      VITALS: BP (!) 164/90 (BP Location: Left arm)   Pulse 79   Temp 96.6  F (35.9  C) (Temporal)   Resp 18      PERTINENT LABS:    Recent Labs   Lab Test 09/15/20  0917 08/10/20  1056 07/29/20  0813   ALBUMIN  --   --  3.5   HGB 14.0  --   --    WBC 7.8  --   --    A1C  --  7.3*  --      PHYSICAL EXAM:  GENERAL: Patient is alert and oriented and in no acute distress  CV: 2+ pitting edema, absent pedal pulses  INTEGUMENTARY: well-healed left lower leg, expected post-inflammatory hyperpigmentation      FOLLOW-UP: PRN    LILLIAM MCKEON PA-C

## 2020-09-30 ENCOUNTER — HOSPITAL ENCOUNTER (OUTPATIENT)
Dept: WOUND CARE | Facility: CLINIC | Age: 65
Discharge: HOME | End: 2020-09-30
Attending: FAMILY MEDICINE | Admitting: FAMILY MEDICINE
Payer: COMMERCIAL

## 2020-09-30 VITALS
HEART RATE: 79 BPM | DIASTOLIC BLOOD PRESSURE: 90 MMHG | SYSTOLIC BLOOD PRESSURE: 164 MMHG | TEMPERATURE: 96.6 F | RESPIRATION RATE: 18 BRPM

## 2020-09-30 DIAGNOSIS — S81.802A OPEN WOUND OF LOWER LIMB, LEFT, INITIAL ENCOUNTER: ICD-10-CM

## 2020-09-30 PROCEDURE — 99213 OFFICE O/P EST LOW 20 MIN: CPT | Performed by: PHYSICIAN ASSISTANT

## 2020-09-30 PROCEDURE — G0463 HOSPITAL OUTPT CLINIC VISIT: HCPCS

## 2020-09-30 RX ORDER — DORZOLAMIDE HYDROCHLORIDE AND TIMOLOL MALEATE 20; 5 MG/ML; MG/ML
SOLUTION/ DROPS OPHTHALMIC
COMMUNITY
Start: 2020-09-29 | End: 2022-06-01

## 2020-09-30 NOTE — DISCHARGE INSTRUCTIONS
[facility]  [address] [place] [address]  Appointment Phone [phone]     Brian CHIARA Flynn      1955    Your wound is Healed!! Dressings are no longer required but recommend that you wear your EdemaWear for swelling control that may be removed at night and put back on first thing in the morning.     Please remove compression dressing if toes turn blue and/or tingle and can not be relieved by raising the leg for one hour.     Home care  Follow these tips when caring for yourself at home:    Use any special compression dressings or stockings as directed.    Walk regularly. This helps the blood flow better in your legs.    Maintain a healthy weight. If you are overweight, talk with your provider about a weight loss program.    If you smoke, quit smoking. This will ease your symptoms and lower the chance that the disease will get worse. Join a stop-smoking program or ask your provider for help in quitting.    Check your feet and legs for skin breaks or color changes. Report these to your provider. This could be an early sign of an ulcer.    When standing, shift your weight from one leg to the other.    When sitting for long periods, put your feet up. Move your feet and ankles often to get your calf muscles moving. Get up and walk from time to time    Please call [facility] [phone] ([address] [place], [place]) to schedule your NANDINI (bloodflow test) appointment if you have not heard from them in two business days. Arrive 15 minutes early.     Diamante Perez PA-C. September 30, 2020

## 2020-10-01 ENCOUNTER — TRANSFERRED RECORDS (OUTPATIENT)
Dept: MULTI SPECIALTY CLINIC | Facility: CLINIC | Age: 65
End: 2020-10-01

## 2020-10-01 LAB — RETINOPATHY: NORMAL

## 2020-10-16 ENCOUNTER — TELEPHONE (OUTPATIENT)
Dept: WOUND CARE | Facility: CLINIC | Age: 65
End: 2020-10-16

## 2020-10-16 ENCOUNTER — HOSPITAL ENCOUNTER (OUTPATIENT)
Dept: ULTRASOUND IMAGING | Facility: CLINIC | Age: 65
Discharge: HOME | End: 2020-10-16
Attending: PHYSICIAN ASSISTANT | Admitting: PHYSICIAN ASSISTANT
Payer: COMMERCIAL

## 2020-10-16 DIAGNOSIS — S81.802S WOUND OF LEFT LOWER EXTREMITY, SEQUELA: ICD-10-CM

## 2020-10-16 DIAGNOSIS — I73.9 PERIPHERAL VASCULAR DISEASE, UNSPECIFIED (H): ICD-10-CM

## 2020-10-16 DIAGNOSIS — S81.802A OPEN WOUND OF LOWER LIMB, LEFT, INITIAL ENCOUNTER: ICD-10-CM

## 2020-10-16 PROCEDURE — 93922 UPR/L XTREMITY ART 2 LEVELS: CPT

## 2020-11-11 ENCOUNTER — OFFICE VISIT (OUTPATIENT)
Dept: FAMILY MEDICINE | Facility: CLINIC | Age: 65
End: 2020-11-11
Payer: COMMERCIAL

## 2020-11-11 VITALS
WEIGHT: 273 LBS | SYSTOLIC BLOOD PRESSURE: 154 MMHG | OXYGEN SATURATION: 95 % | DIASTOLIC BLOOD PRESSURE: 70 MMHG | TEMPERATURE: 98.5 F | BODY MASS INDEX: 40.3 KG/M2 | HEART RATE: 76 BPM

## 2020-11-11 DIAGNOSIS — R60.0 LOWER EXTREMITY EDEMA: ICD-10-CM

## 2020-11-11 DIAGNOSIS — H66.90 ACUTE OTITIS MEDIA, UNSPECIFIED OTITIS MEDIA TYPE: ICD-10-CM

## 2020-11-11 DIAGNOSIS — I10 ESSENTIAL HYPERTENSION, BENIGN: Primary | ICD-10-CM

## 2020-11-11 DIAGNOSIS — E78.5 HYPERLIPIDEMIA LDL GOAL <130: ICD-10-CM

## 2020-11-11 DIAGNOSIS — E11.69 TYPE 2 DIABETES MELLITUS WITH OTHER SPECIFIED COMPLICATION, WITHOUT LONG-TERM CURRENT USE OF INSULIN (H): ICD-10-CM

## 2020-11-11 LAB
CREAT UR-MCNC: 121 MG/DL
HBA1C MFR BLD: 5.9 % (ref 0–5.6)
MICROALBUMIN UR-MCNC: 9 MG/L
MICROALBUMIN/CREAT UR: 7.35 MG/G CR (ref 0–17)

## 2020-11-11 PROCEDURE — 36415 COLL VENOUS BLD VENIPUNCTURE: CPT | Performed by: FAMILY MEDICINE

## 2020-11-11 PROCEDURE — 82043 UR ALBUMIN QUANTITATIVE: CPT | Performed by: FAMILY MEDICINE

## 2020-11-11 PROCEDURE — 83036 HEMOGLOBIN GLYCOSYLATED A1C: CPT | Performed by: FAMILY MEDICINE

## 2020-11-11 PROCEDURE — 99214 OFFICE O/P EST MOD 30 MIN: CPT | Performed by: FAMILY MEDICINE

## 2020-11-11 RX ORDER — OFLOXACIN 3 MG/ML
SOLUTION AURICULAR (OTIC)
COMMUNITY
Start: 2020-10-27 | End: 2022-06-01

## 2020-11-11 RX ORDER — OFLOXACIN 3 MG/ML
5 SOLUTION AURICULAR (OTIC) 2 TIMES DAILY
Qty: 5 ML | Refills: 0 | Status: SHIPPED | OUTPATIENT
Start: 2020-11-11 | End: 2021-05-21

## 2020-11-11 RX ORDER — LISINOPRIL AND HYDROCHLOROTHIAZIDE 20; 25 MG/1; MG/1
1 TABLET ORAL DAILY
Qty: 90 TABLET | Refills: 1 | Status: SHIPPED | OUTPATIENT
Start: 2020-11-11 | End: 2021-05-10

## 2020-11-11 RX ORDER — ATORVASTATIN CALCIUM 20 MG/1
20 TABLET, FILM COATED ORAL DAILY
Qty: 90 TABLET | Refills: 3 | Status: SHIPPED | OUTPATIENT
Start: 2020-11-11 | End: 2021-08-25

## 2020-11-11 RX ORDER — METOPROLOL SUCCINATE 50 MG/1
50 TABLET, EXTENDED RELEASE ORAL DAILY
Qty: 90 TABLET | Refills: 1 | Status: SHIPPED | OUTPATIENT
Start: 2020-11-11 | End: 2021-05-21

## 2020-11-11 RX ORDER — AMLODIPINE BESYLATE 5 MG/1
5 TABLET ORAL DAILY
Qty: 90 TABLET | Refills: 1 | Status: SHIPPED | OUTPATIENT
Start: 2020-11-11 | End: 2021-05-10

## 2020-11-11 NOTE — PROGRESS NOTES
Subjective     Brian Flynn is a 65 year old male who presents to clinic today for the following health issues:    HPI         Diabetes Follow-up      How often are you checking your blood sugar? Not at all    What concerns do you have today about your diabetes? None     Do you have any of these symptoms? (Select all that apply)  Numbness in feet and Burning in feet    Have you had a diabetic eye exam in the last 12 months? Yes- Date of last eye exam: 10/2020,  Location: [facility]        BP Readings from Last 2 Encounters:   11/11/20 (!) 154/70   09/30/20 (!) 164/90     Hemoglobin A1C (%)   Date Value   08/10/2020 7.3 (H)   12/11/2019 5.5     LDL Cholesterol Calculated (mg/dL)   Date Value   07/29/2020 66   01/10/2019 151 (H)                   Acute Illness  Acute illness concerns: left ear pain  Onset/Duration: 10/27 seen at [facility] given drops  Symptoms:  Fever: no  Chills/Sweats: no  Headache (location?): no  Sinus Pressure: no  Conjunctivitis:  no  Ear Pain: YES: left  Rhinorrhea: no  Congestion: no  Sore Throat: no  Cough: no  Wheeze: no  Decreased Appetite: no  Nausea: no  Vomiting: no  Diarrhea: no  Dysuria/Freq.: no  Dysuria or Hematuria: no  Fatigue/Achiness: no  Sick/Strep Exposure: no  Therapies tried and outcome: ofloxacin    Review of Systems   Constitutional, HEENT, cardiovascular, pulmonary, gi and gu systems are negative, except as otherwise noted.      Objective    BP (!) 154/70   Pulse 76   Temp 98.5  F (36.9  C) (Tympanic)   Wt 123.8 kg (273 lb)   SpO2 95%   BMI 40.30 kg/m    Body mass index is 40.3 kg/m .  Physical Exam   GENERAL: healthy, alert and no distress  NECK: no adenopathy, no asymmetry, masses, or scars and thyroid normal to palpation  RESP: lungs clear to auscultation - no rales, rhonchi or wheezes  CV: regular rate and rhythm, normal S1 S2, no S3 or S4, no murmur, click or rub, no peripheral edema and peripheral pulses strong  ABDOMEN: soft, nontender, no  "hepatosplenomegaly, no masses and bowel sounds normal  MS: no gross musculoskeletal defects noted, no edema  Left tympanic membrane is not clear.  There is narrowing of your canal        Assessment & Plan     Essential hypertension, benign    - metoprolol succinate ER (TOPROL-XL) 50 MG 24 hr tablet; Take 1 tablet (50 mg) by mouth daily  - lisinopril-hydrochlorothiazide (ZESTORETIC) 20-25 MG tablet; Take 1 tablet by mouth daily  - atorvastatin (LIPITOR) 20 MG tablet; Take 1 tablet (20 mg) by mouth daily    Hyperlipidemia LDL goal <130    - atorvastatin (LIPITOR) 20 MG tablet; Take 1 tablet (20 mg) by mouth daily    Type 2 diabetes mellitus with other specified complication, without long-term current use of insulin (H)    - Albumin Random Urine Quantitative with Creat Ratio  - HEMOGLOBIN A1C  - metFORMIN (GLUCOPHAGE) 500 MG tablet; Take 1 tablet (500 mg) by mouth 2 times daily (with meals)    Lower extremity edema  continue 5 mg daily.  - amLODIPine (NORVASC) 5 MG tablet; Take 1 tablet (5 mg) by mouth daily    Acute otitis media, unspecified otitis media type  Patient has otitis media with some externa.  We will continue drops and change antibiotic to Augmentin to see if that helps.  Follow-up if there is no change .  - amoxicillin-clavulanate (AUGMENTIN) 875-125 MG tablet; Take 1 tablet by mouth 2 times daily for 7 days  - ofloxacin (FLOXIN) 0.3 % otic solution; Place 5 drops Into the left ear 2 times daily     BMI:   Estimated body mass index is 40.3 kg/m  as calculated from the following:    Height as of 9/16/20: 1.753 m (5' 9.02\").    Weight as of this encounter: 123.8 kg (273 lb).     Kirill Beal MD  [facility]    "

## 2021-05-08 DIAGNOSIS — E11.69 TYPE 2 DIABETES MELLITUS WITH OTHER SPECIFIED COMPLICATION, WITHOUT LONG-TERM CURRENT USE OF INSULIN (H): ICD-10-CM

## 2021-05-08 DIAGNOSIS — I10 ESSENTIAL HYPERTENSION, BENIGN: ICD-10-CM

## 2021-05-10 DIAGNOSIS — R60.0 LOWER EXTREMITY EDEMA: ICD-10-CM

## 2021-05-10 RX ORDER — LISINOPRIL AND HYDROCHLOROTHIAZIDE 20; 25 MG/1; MG/1
1 TABLET ORAL DAILY
Qty: 90 TABLET | Refills: 0 | Status: SHIPPED | OUTPATIENT
Start: 2021-05-10 | End: 2021-08-25

## 2021-05-10 RX ORDER — AMLODIPINE BESYLATE 5 MG/1
TABLET ORAL
Qty: 90 TABLET | Refills: 1 | Status: SHIPPED | OUTPATIENT
Start: 2021-05-10 | End: 2021-11-08

## 2021-05-10 NOTE — TELEPHONE ENCOUNTER
Prescription approved per [facility] Refill Protocol.    Angelica ESCOBAR RN  EP Triage

## 2021-05-10 NOTE — TELEPHONE ENCOUNTER
Medication is being filled for 1 time refill only due to:  Patient needs to be seen because for diabetes med check.     Angelica ESCOBAR RN  EP Triage

## 2021-05-10 NOTE — TELEPHONE ENCOUNTER
Routing refill request to provider for review/approval because:  Failed BP protocol.  Pt last seen in November 2020    Angelica ESCOBAR RN  EP Triage

## 2021-05-11 ENCOUNTER — TELEPHONE (OUTPATIENT)
Dept: FAMILY MEDICINE | Facility: CLINIC | Age: 66
End: 2021-05-11

## 2021-05-11 NOTE — LETTER
May 11, 2021      Brian Flynn  [address]  [facility] [address]        Dear Brian,    I care about your health and have reviewed your health plan. I have reviewed your medical conditions, medication list, and lab results and am making recommendations based on this review, to better manage your health.    You are in particular need of attention regarding:  -Wellness (Physical) Visit     I am recommending that you:  -schedule a WELLNESS (Physical) APPOINTMENT with me.   I will check fasting labs the same day - nothing to eat except water and meds for 8-10 hours prior.    Here is a list of Health Maintenance topics that are due now or due soon:  Health Maintenance Due   Topic Date Due     Diabetic Foot Exam  Never done     ANNUAL REVIEW OF HM ORDERS  Never done     Discuss Advance Care Planning  Never done     COVID-19 Vaccine (1) Never done     Annual Wellness Visit  05/12/2019     AORTIC ANEURYSM SCREENING (SYSTEM ASSIGNED)  Never done     Pneumococcal Vaccine (1 of 2 - PPSV23) 02/05/2020     Pneumococcal Vaccine (1 of 2 - PPSV23) 02/05/2020     FALL RISK ASSESSMENT  08/13/2020     PHQ-2  01/01/2021     A1C Lab  02/11/2021       Please call us at [phone] (or use "[email or web address] to address the above recommendations.     Thank you for trusting [facility] and we appreciate the opportunity to serve you.  We look forward to supporting your healthcare needs in the future.    Healthy Regards,    Kirill Beal MD

## 2021-05-11 NOTE — TELEPHONE ENCOUNTER
Patient Quality Outreach      Summary:    Patient has the following on his problem list/HM: None    Patient is due/failing the following:   Annual wellness, date due: 2019    Type of outreach:    Sent letter.    Questions for provider review:    None                                                                                                                                     Maritza Kenney CMA       Chart routed to Care Team.

## 2021-05-20 DIAGNOSIS — I10 ESSENTIAL HYPERTENSION, BENIGN: ICD-10-CM

## 2021-05-21 ENCOUNTER — OFFICE VISIT (OUTPATIENT)
Dept: FAMILY MEDICINE | Facility: CLINIC | Age: 66
End: 2021-05-21
Payer: COMMERCIAL

## 2021-05-21 VITALS
DIASTOLIC BLOOD PRESSURE: 74 MMHG | HEART RATE: 70 BPM | BODY MASS INDEX: 37.33 KG/M2 | TEMPERATURE: 98.1 F | WEIGHT: 252 LBS | OXYGEN SATURATION: 96 % | HEIGHT: 69 IN | SYSTOLIC BLOOD PRESSURE: 136 MMHG

## 2021-05-21 DIAGNOSIS — E78.5 HYPERLIPIDEMIA LDL GOAL <100: ICD-10-CM

## 2021-05-21 DIAGNOSIS — E66.01 MORBID OBESITY (H): ICD-10-CM

## 2021-05-21 DIAGNOSIS — Z00.00 ENCOUNTER FOR ANNUAL PHYSICAL EXAM: ICD-10-CM

## 2021-05-21 DIAGNOSIS — E11.69 TYPE 2 DIABETES MELLITUS WITH OTHER SPECIFIED COMPLICATION, WITHOUT LONG-TERM CURRENT USE OF INSULIN (H): ICD-10-CM

## 2021-05-21 DIAGNOSIS — I10 ESSENTIAL HYPERTENSION, BENIGN: Primary | ICD-10-CM

## 2021-05-21 PROBLEM — R73.03 PREDIABETES: Status: RESOLVED | Noted: 2019-08-05 | Resolved: 2021-05-21

## 2021-05-21 LAB
ALBUMIN SERPL-MCNC: 3.7 G/DL (ref 3.4–5)
ALP SERPL-CCNC: 106 U/L (ref 40–150)
ALT SERPL W P-5'-P-CCNC: 40 U/L (ref 0–70)
ANION GAP SERPL CALCULATED.3IONS-SCNC: 3 MMOL/L (ref 3–14)
AST SERPL W P-5'-P-CCNC: 21 U/L (ref 0–45)
BILIRUB SERPL-MCNC: 1.6 MG/DL (ref 0.2–1.3)
BUN SERPL-MCNC: 26 MG/DL (ref 7–30)
CALCIUM SERPL-MCNC: 9.4 MG/DL (ref 8.5–10.1)
CHLORIDE SERPL-SCNC: 108 MMOL/L (ref 94–109)
CHOLEST SERPL-MCNC: 115 MG/DL
CO2 SERPL-SCNC: 29 MMOL/L (ref 20–32)
CREAT SERPL-MCNC: 1.3 MG/DL (ref 0.66–1.25)
GFR SERPL CREATININE-BSD FRML MDRD: 56 ML/MIN/{1.73_M2}
GLUCOSE SERPL-MCNC: 113 MG/DL (ref 70–99)
HBA1C MFR BLD: 7 % (ref 0–5.6)
HDLC SERPL-MCNC: 36 MG/DL
HGB BLD-MCNC: 14.5 G/DL (ref 13.3–17.7)
LDLC SERPL CALC-MCNC: 57 MG/DL
NONHDLC SERPL-MCNC: 79 MG/DL
POTASSIUM SERPL-SCNC: 4.9 MMOL/L (ref 3.4–5.3)
PROT SERPL-MCNC: 7.7 G/DL (ref 6.8–8.8)
SODIUM SERPL-SCNC: 140 MMOL/L (ref 133–144)
TRIGL SERPL-MCNC: 112 MG/DL

## 2021-05-21 PROCEDURE — 36415 COLL VENOUS BLD VENIPUNCTURE: CPT | Performed by: FAMILY MEDICINE

## 2021-05-21 PROCEDURE — 80053 COMPREHEN METABOLIC PANEL: CPT | Performed by: FAMILY MEDICINE

## 2021-05-21 PROCEDURE — 99214 OFFICE O/P EST MOD 30 MIN: CPT | Mod: 25 | Performed by: FAMILY MEDICINE

## 2021-05-21 PROCEDURE — 85018 HEMOGLOBIN: CPT | Performed by: FAMILY MEDICINE

## 2021-05-21 PROCEDURE — 80061 LIPID PANEL: CPT | Performed by: FAMILY MEDICINE

## 2021-05-21 PROCEDURE — 83036 HEMOGLOBIN GLYCOSYLATED A1C: CPT | Performed by: FAMILY MEDICINE

## 2021-05-21 PROCEDURE — G0438 PPPS, INITIAL VISIT: HCPCS | Performed by: FAMILY MEDICINE

## 2021-05-21 RX ORDER — BLOOD-GLUCOSE METER
EACH MISCELLANEOUS
Qty: 1 KIT | Refills: 0 | Status: SHIPPED | OUTPATIENT
Start: 2021-05-21

## 2021-05-21 RX ORDER — BLOOD SUGAR DIAGNOSTIC
STRIP MISCELLANEOUS
Qty: 100 STRIP | Status: SHIPPED | OUTPATIENT
Start: 2021-05-21

## 2021-05-21 RX ORDER — METOPROLOL SUCCINATE 50 MG/1
TABLET, EXTENDED RELEASE ORAL
Qty: 90 TABLET | Refills: 1 | Status: SHIPPED | OUTPATIENT
Start: 2021-05-21 | End: 2021-08-25

## 2021-05-21 RX ORDER — LANCETS
EACH MISCELLANEOUS
Qty: 100 EACH | Refills: 1 | Status: SHIPPED | OUTPATIENT
Start: 2021-05-21 | End: 2023-12-12

## 2021-05-21 ASSESSMENT — MIFFLIN-ST. JEOR: SCORE: 1900.56

## 2021-05-21 ASSESSMENT — ACTIVITIES OF DAILY LIVING (ADL): CURRENT_FUNCTION: NO ASSISTANCE NEEDED

## 2021-05-21 NOTE — TELEPHONE ENCOUNTER
Routing refill request to provider for review/approval because:  Failed BP protocol    Angelica ESCOBAR RN  EP Triage

## 2021-05-25 ENCOUNTER — TELEPHONE (OUTPATIENT)
Dept: FAMILY MEDICINE | Facility: CLINIC | Age: 66
End: 2021-05-25

## 2021-05-25 NOTE — TELEPHONE ENCOUNTER
"Patient given result message from Dr. Beal.  Patient states that he does not want to take metformin twice a day because it does not agree with his stomach. He states that he has lost 30 pounds. States that he is dieting and exercising. States that he now has a blood glucose monitor and is watching his blood sugars. States that last night his blood sugar was 115. He states that he really wants to try to control his blood sugar \"the natural way.\"    Patient agrees to dry more liquids to try to improve kidney labs. He scheduled follow up appointment for 8/25 for follow up and labs.  Caitlin Jacome RN        "

## 2021-05-25 NOTE — TELEPHONE ENCOUNTER
----- Message from Kirill Beal MD sent at 5/25/2021  7:10 AM CDT -----  Please call and notify the patient.  Cholesterol levels are excellent please continue the cholesterol medication.  Blood sugar numbers are elevated your hemoglobin A1c is now at 7 which indicate for the last 3 months her blood sugar has not been very well controlled.  If you are taking Metformin once daily I would suggest you to take morning and evening.  Follow-up in 3 months for recheck.  Kidney functions are mildly elevated we will recheck again in 3 months to make sure they are stable.  Sometimes blood pressure medication or dehydration or certain diets can cause that however I would like you to continue the medication and will recheck in 3 months.  If continue to be high we will adjust the dose.  I would like to see him back in the clinic in 3 months for recheck of labs and dose adjustment if needed.    Kirill Beal MD

## 2021-05-25 NOTE — TELEPHONE ENCOUNTER
S/w wife and advised to have pt call the clinic for message from Dr. Beal regarding lab results.    Wife will give pt the message to call the clinic at [phone] and can speak with any nurse.    Angelica ESCOBAR RN  EP Triage

## 2021-08-25 ENCOUNTER — OFFICE VISIT (OUTPATIENT)
Dept: FAMILY MEDICINE | Facility: CLINIC | Age: 66
End: 2021-08-25
Payer: COMMERCIAL

## 2021-08-25 VITALS
BODY MASS INDEX: 36.22 KG/M2 | OXYGEN SATURATION: 97 % | HEART RATE: 67 BPM | RESPIRATION RATE: 10 BRPM | DIASTOLIC BLOOD PRESSURE: 82 MMHG | SYSTOLIC BLOOD PRESSURE: 136 MMHG | WEIGHT: 239 LBS | HEIGHT: 68 IN | TEMPERATURE: 97.6 F

## 2021-08-25 DIAGNOSIS — E78.5 HYPERLIPIDEMIA LDL GOAL <130: ICD-10-CM

## 2021-08-25 DIAGNOSIS — I10 ESSENTIAL HYPERTENSION, BENIGN: Primary | ICD-10-CM

## 2021-08-25 DIAGNOSIS — E11.69 TYPE 2 DIABETES MELLITUS WITH OTHER SPECIFIED COMPLICATION, WITHOUT LONG-TERM CURRENT USE OF INSULIN (H): ICD-10-CM

## 2021-08-25 LAB — HBA1C MFR BLD: 5.3 % (ref 0–5.6)

## 2021-08-25 PROCEDURE — 36415 COLL VENOUS BLD VENIPUNCTURE: CPT | Performed by: FAMILY MEDICINE

## 2021-08-25 PROCEDURE — 80048 BASIC METABOLIC PNL TOTAL CA: CPT | Performed by: FAMILY MEDICINE

## 2021-08-25 PROCEDURE — 99214 OFFICE O/P EST MOD 30 MIN: CPT | Performed by: FAMILY MEDICINE

## 2021-08-25 PROCEDURE — 83036 HEMOGLOBIN GLYCOSYLATED A1C: CPT | Performed by: FAMILY MEDICINE

## 2021-08-25 RX ORDER — METOPROLOL SUCCINATE 50 MG/1
50 TABLET, EXTENDED RELEASE ORAL DAILY
Qty: 90 TABLET | Refills: 1 | Status: SHIPPED | OUTPATIENT
Start: 2021-08-25 | End: 2022-02-25

## 2021-08-25 RX ORDER — ATORVASTATIN CALCIUM 20 MG/1
20 TABLET, FILM COATED ORAL DAILY
Qty: 90 TABLET | Refills: 1 | Status: SHIPPED | OUTPATIENT
Start: 2021-08-25 | End: 2022-02-25

## 2021-08-25 RX ORDER — LISINOPRIL AND HYDROCHLOROTHIAZIDE 20; 25 MG/1; MG/1
1 TABLET ORAL DAILY
Qty: 90 TABLET | Refills: 1 | Status: SHIPPED | OUTPATIENT
Start: 2021-08-25 | End: 2022-02-25

## 2021-08-25 ASSESSMENT — MIFFLIN-ST. JEOR: SCORE: 1833.6

## 2021-08-25 ASSESSMENT — PAIN SCALES - GENERAL: PAINLEVEL: NO PAIN (0)

## 2021-08-25 NOTE — PROGRESS NOTES
Assessment & Plan     Essential hypertension, benign    - HEMOGLOBIN A1C; Future  - Basic metabolic panel  (Ca, Cl, CO2, Creat, Gluc, K, Na, BUN); Future  - HEMOGLOBIN A1C  - Basic metabolic panel  (Ca, Cl, CO2, Creat, Gluc, K, Na, BUN)  - metoprolol succinate ER (TOPROL-XL) 50 MG 24 hr tablet; Take 1 tablet (50 mg) by mouth daily  - lisinopril-hydrochlorothiazide (ZESTORETIC) 20-25 MG tablet; Take 1 tablet by mouth daily  - atorvastatin (LIPITOR) 20 MG tablet; Take 1 tablet (20 mg) by mouth daily    Type 2 diabetes mellitus with other specified complication, without long-term current use of insulin (H)  Patient hemoglobin A1c is much better he is advised to continue Metformin twice daily 500 mg.  We will continue to observe his overall kidney function if they are stable we can follow-up in 6 months.  He is excited to hear the number and continue to work on his diet and exercise follow-up in 6 months- HEMOGLOBIN A1C; Future  - Basic metabolic panel  (Ca, Cl, CO2, Creat, Gluc, K, Na, BUN); Future  - HEMOGLOBIN A1C  - Basic metabolic panel  (Ca, Cl, CO2, Creat, Gluc, K, Na, BUN)  - metFORMIN (GLUCOPHAGE) 500 MG tablet; TAKE 1 TABLET(500 MG) BY MOUTH TWICE DAILY WITH MEALS    Hyperlipidemia LDL goal <130    - atorvastatin (LIPITOR) 20 MG tablet; Take 1 tablet (20 mg) by mouth daily                 No follow-ups on file.    Kirill Beal MD  [facility] Torres is a 66 year old who presents for the following health issues    HPI     Diabetes Follow-up  Patient was restarted on medication with Metformin morning and evening.  He is also working hard on his diet and exercise keeping himself hydrated but excited to know what his numbers would be.    How often are you checking your blood sugar? Not on a regular basis, no set pattern    What concerns do you have today about your diabetes? None     Do you have any of these symptoms? (Select all that apply)  No numbness or tingling in  feet.  No redness, sores or blisters on feet.  No complaints of excessive thirst.  No reports of blurry vision.  No significant changes to weight.      BP Readings from Last 2 Encounters:   05/21/21 136/74   11/11/20 (!) 154/70     Hemoglobin A1C (%)   Date Value   05/21/2021 7.0 (H)   11/11/2020 5.9 (H)     LDL Cholesterol Calculated (mg/dL)   Date Value   05/21/2021 57   07/29/2020 66                 How many servings of fruits and vegetables do you eat daily?  4 or more    On average, how many sweetened beverages do you drink each day (Examples: soda, juice, sweet tea, etc.  Do NOT count diet or artificially sweetened beverages)?   0    How many days per week do you exercise enough to make your heart beat faster? daily     How many minutes a day do you exercise enough to make your heart beat faster? 1 hr    How many days per week do you miss taking your medication? 0      Review of Systems   Constitutional, HEENT, cardiovascular, pulmonary, gi and gu systems are negative, except as otherwise noted.      Objective    There were no vitals taken for this visit.  There is no height or weight on file to calculate BMI.  Physical Exam   GENERAL: healthy, alert and no distress  NECK: no adenopathy, no asymmetry, masses, or scars and thyroid normal to palpation  RESP: lungs clear to auscultation - no rales, rhonchi or wheezes  CV: regular rate and rhythm, normal S1 S2, no S3 or S4, no murmur, click or rub, no peripheral edema and peripheral pulses strong

## 2021-08-25 NOTE — LETTER
August 27, 2021      Brian Flynn  [address]  [place] [facility] [place] [facility] [address]        Dear ,    We are writing to inform you of your test results.    -Kidney function is normal (Cr, GFR), Sodium is normal, Potassium is normal, Calcium is normal, Glucose is normal.   -A1C (test of diabetes control the last 2-3 months) is at your goal. Please continue with your current plan. Also, you should make an appointment to see me and recheck your A1C test in 6 months.      Resulted Orders   HEMOGLOBIN A1C   Result Value Ref Range    Hemoglobin A1C 5.3 0.0 - 5.6 %      Comment:      Normal <5.7%   Prediabetes 5.7-6.4%    Diabetes 6.5% or higher     Note: Adopted from ADA consensus guidelines.   Basic metabolic panel  (Ca, Cl, CO2, Creat, Gluc, K, Na, BUN)   Result Value Ref Range    Sodium 139 133 - 144 mmol/L    Potassium 4.7 3.4 - 5.3 mmol/L    Chloride 107 94 - 109 mmol/L    Carbon Dioxide (CO2) 29 20 - 32 mmol/L    Anion Gap 3 3 - 14 mmol/L    Urea Nitrogen 27 7 - 30 mg/dL    Creatinine 1.25 0.66 - 1.25 mg/dL    Calcium 9.5 8.5 - 10.1 mg/dL    Glucose 97 70 - 99 mg/dL    GFR Estimate 59 (L) >60 mL/min/1.73m2      Comment:      As of July 11, 2021, eGFR is calculated by the CKD-EPI creatinine equation, without race adjustment. eGFR can be influenced by muscle mass, exercise, and diet. The reported eGFR is an estimation only and is only applicable if the renal function is stable.       If you have any questions or concerns, please call the clinic at the number listed above.       Sincerely,      Kirill Beal MD

## 2021-08-27 LAB
ANION GAP SERPL CALCULATED.3IONS-SCNC: 3 MMOL/L (ref 3–14)
BUN SERPL-MCNC: 27 MG/DL (ref 7–30)
CALCIUM SERPL-MCNC: 9.5 MG/DL (ref 8.5–10.1)
CHLORIDE BLD-SCNC: 107 MMOL/L (ref 94–109)
CO2 SERPL-SCNC: 29 MMOL/L (ref 20–32)
CREAT SERPL-MCNC: 1.25 MG/DL (ref 0.66–1.25)
GFR SERPL CREATININE-BSD FRML MDRD: 59 ML/MIN/1.73M2
GLUCOSE BLD-MCNC: 97 MG/DL (ref 70–99)
POTASSIUM BLD-SCNC: 4.7 MMOL/L (ref 3.4–5.3)
SODIUM SERPL-SCNC: 139 MMOL/L (ref 133–144)

## 2021-09-03 DIAGNOSIS — E11.69 TYPE 2 DIABETES MELLITUS WITH OTHER SPECIFIED COMPLICATION, WITHOUT LONG-TERM CURRENT USE OF INSULIN (H): ICD-10-CM

## 2021-09-03 DIAGNOSIS — I10 ESSENTIAL HYPERTENSION, BENIGN: ICD-10-CM

## 2021-09-03 RX ORDER — LISINOPRIL AND HYDROCHLOROTHIAZIDE 20; 25 MG/1; MG/1
1 TABLET ORAL DAILY
Qty: 90 TABLET | Refills: 1 | OUTPATIENT
Start: 2021-09-03

## 2021-11-07 DIAGNOSIS — R60.0 LOWER EXTREMITY EDEMA: ICD-10-CM

## 2021-11-08 RX ORDER — AMLODIPINE BESYLATE 5 MG/1
TABLET ORAL
Qty: 90 TABLET | Refills: 0 | Status: SHIPPED | OUTPATIENT
Start: 2021-11-08 | End: 2022-02-25

## 2021-11-08 NOTE — TELEPHONE ENCOUNTER
Prescription approved per [facility] Refill Protocol.    Angelica ESCOBAR RN  EP Triage

## 2021-12-13 DIAGNOSIS — E11.69 TYPE 2 DIABETES MELLITUS WITH OTHER SPECIFIED COMPLICATION, WITHOUT LONG-TERM CURRENT USE OF INSULIN (H): ICD-10-CM

## 2022-02-03 ENCOUNTER — TRANSFERRED RECORDS (OUTPATIENT)
Dept: HEALTH INFORMATION MANAGEMENT | Facility: CLINIC | Age: 67
End: 2022-02-03
Payer: COMMERCIAL

## 2022-02-03 LAB — RETINOPATHY: NEGATIVE

## 2022-02-25 ENCOUNTER — OFFICE VISIT (OUTPATIENT)
Dept: FAMILY MEDICINE | Facility: CLINIC | Age: 67
End: 2022-02-25
Payer: COMMERCIAL

## 2022-02-25 VITALS
RESPIRATION RATE: 18 BRPM | SYSTOLIC BLOOD PRESSURE: 136 MMHG | BODY MASS INDEX: 36.22 KG/M2 | WEIGHT: 253 LBS | TEMPERATURE: 97.6 F | OXYGEN SATURATION: 96 % | HEIGHT: 70 IN | DIASTOLIC BLOOD PRESSURE: 74 MMHG | HEART RATE: 79 BPM

## 2022-02-25 DIAGNOSIS — I10 ESSENTIAL HYPERTENSION, BENIGN: ICD-10-CM

## 2022-02-25 DIAGNOSIS — R97.20 ELEVATED PROSTATE SPECIFIC ANTIGEN (PSA): ICD-10-CM

## 2022-02-25 DIAGNOSIS — E11.69 TYPE 2 DIABETES MELLITUS WITH OTHER SPECIFIED COMPLICATION, WITHOUT LONG-TERM CURRENT USE OF INSULIN (H): Primary | ICD-10-CM

## 2022-02-25 DIAGNOSIS — E78.5 HYPERLIPIDEMIA LDL GOAL <130: ICD-10-CM

## 2022-02-25 DIAGNOSIS — Z23 NEED FOR VACCINATION: ICD-10-CM

## 2022-02-25 DIAGNOSIS — E66.01 MORBID OBESITY (H): ICD-10-CM

## 2022-02-25 DIAGNOSIS — Z12.5 SCREENING FOR PROSTATE CANCER: ICD-10-CM

## 2022-02-25 DIAGNOSIS — E78.5 HYPERLIPIDEMIA LDL GOAL <100: ICD-10-CM

## 2022-02-25 LAB
CREAT UR-MCNC: 110 MG/DL
HBA1C MFR BLD: 5.8 % (ref 0–5.6)
MICROALBUMIN UR-MCNC: 14 MG/L
MICROALBUMIN/CREAT UR: 12.73 MG/G CR (ref 0–17)

## 2022-02-25 PROCEDURE — 80061 LIPID PANEL: CPT | Performed by: FAMILY MEDICINE

## 2022-02-25 PROCEDURE — 36415 COLL VENOUS BLD VENIPUNCTURE: CPT | Performed by: FAMILY MEDICINE

## 2022-02-25 PROCEDURE — 82043 UR ALBUMIN QUANTITATIVE: CPT | Performed by: FAMILY MEDICINE

## 2022-02-25 PROCEDURE — 90732 PPSV23 VACC 2 YRS+ SUBQ/IM: CPT | Performed by: FAMILY MEDICINE

## 2022-02-25 PROCEDURE — G0103 PSA SCREENING: HCPCS | Performed by: FAMILY MEDICINE

## 2022-02-25 PROCEDURE — G0009 ADMIN PNEUMOCOCCAL VACCINE: HCPCS | Performed by: FAMILY MEDICINE

## 2022-02-25 PROCEDURE — 80053 COMPREHEN METABOLIC PANEL: CPT | Performed by: FAMILY MEDICINE

## 2022-02-25 PROCEDURE — 83036 HEMOGLOBIN GLYCOSYLATED A1C: CPT | Performed by: FAMILY MEDICINE

## 2022-02-25 PROCEDURE — 99214 OFFICE O/P EST MOD 30 MIN: CPT | Mod: 25 | Performed by: FAMILY MEDICINE

## 2022-02-25 RX ORDER — ATORVASTATIN CALCIUM 20 MG/1
20 TABLET, FILM COATED ORAL DAILY
Qty: 90 TABLET | Refills: 1 | Status: SHIPPED | OUTPATIENT
Start: 2022-02-25 | End: 2022-08-19

## 2022-02-25 RX ORDER — AMLODIPINE BESYLATE 5 MG/1
5 TABLET ORAL DAILY
Qty: 90 TABLET | Refills: 1 | Status: SHIPPED | OUTPATIENT
Start: 2022-02-25 | End: 2022-08-19

## 2022-02-25 RX ORDER — LISINOPRIL AND HYDROCHLOROTHIAZIDE 20; 25 MG/1; MG/1
1 TABLET ORAL DAILY
Qty: 90 TABLET | Refills: 1 | Status: SHIPPED | OUTPATIENT
Start: 2022-02-25 | End: 2022-08-19

## 2022-02-25 RX ORDER — METOPROLOL SUCCINATE 50 MG/1
50 TABLET, EXTENDED RELEASE ORAL DAILY
Qty: 90 TABLET | Refills: 1 | Status: SHIPPED | OUTPATIENT
Start: 2022-02-25 | End: 2022-08-19

## 2022-02-25 ASSESSMENT — PAIN SCALES - GENERAL: PAINLEVEL: NO PAIN (0)

## 2022-02-25 NOTE — PROGRESS NOTES
"  Assessment & Plan     Type 2 diabetes mellitus with other specified complication, without long-term current use of insulin (H)  Hemoglobin A1c slight worse but still within reasonable range.  No change in medication.  Follow-up in 6 months for recheck  - Albumin Random Urine Quantitative with Creat Ratio; Future  - HEMOGLOBIN A1C; Future  - Comprehensive metabolic panel (BMP + Alb, Alk Phos, ALT, AST, Total. Bili, TP); Future  - Comprehensive metabolic panel (BMP + Alb, Alk Phos, ALT, AST, Total. Bili, TP)  - HEMOGLOBIN A1C  - Albumin Random Urine Quantitative with Creat Ratio  - metFORMIN (GLUCOPHAGE) 500 MG tablet; Take 1 tab twice daily.    Morbid obesity (H)      Essential hypertension, benign  Patient blood pressure was elevated recheck was somewhat better.  Currently on 3 blood pressure medications.  Suggested to continue taking those  - Comprehensive metabolic panel (BMP + Alb, Alk Phos, ALT, AST, Total. Bili, TP); Future  - Comprehensive metabolic panel (BMP + Alb, Alk Phos, ALT, AST, Total. Bili, TP)  - amLODIPine (NORVASC) 5 MG tablet; Take 1 tablet (5 mg) by mouth daily  - lisinopril-hydrochlorothiazide (ZESTORETIC) 20-25 MG tablet; Take 1 tablet by mouth daily  - metoprolol succinate ER (TOPROL-XL) 50 MG 24 hr tablet; Take 1 tablet (50 mg) by mouth daily    Hyperlipidemia LDL goal <100    - Lipid Profile; Future  - Comprehensive metabolic panel (BMP + Alb, Alk Phos, ALT, AST, Total. Bili, TP); Future  - Comprehensive metabolic panel (BMP + Alb, Alk Phos, ALT, AST, Total. Bili, TP)  - Lipid Profile    Screening for prostate cancer    - PSA, screen; Future  - PSA, screen    Need for vaccination    - Pneumococcal vaccine 23 valent PPSV23  (Pneumovax) [97599]    Hyperlipidemia LDL goal <130    - atorvastatin (LIPITOR) 20 MG tablet; Take 1 tablet (20 mg) by mouth daily    BMI:   Estimated body mass index is 36.3 kg/m  as calculated from the following:    Height as of this encounter: 1.778 m (5' 10\").    " "Weight as of this encounter: 114.8 kg (253 lb).           Return in about 6 months (around 8/25/2022) for Physical Exam.    Kirill Beal MD  [facility] AMY Torres is a 66 year old who presents for the following health issues  Patient came today to discuss his chronic medical condition including high blood pressure cholesterol along with diabetes.  His diabetes has been well controlled.  She currently on Metformin twice daily dosing 500 mg.  Patient does have whitecoat hypertension.  Is currently on 3 blood pressure medications denies any side effects  He has been doing exercise daily however he has gained some weight since he met us last summer.  He thinks he has somewhat sedentary lifestyle.          Review of Systems   Constitutional, HEENT, cardiovascular, pulmonary, gi and gu systems are negative, except as otherwise noted.      Objective    BP (!) 147/71   Pulse 79   Temp 97.6  F (36.4  C) (Tympanic)   Resp 18   Ht 1.778 m (5' 10\")   Wt 114.8 kg (253 lb)   SpO2 96%   BMI 36.30 kg/m    Body mass index is 36.3 kg/m .  Physical Exam   GENERAL: healthy, alert and no distress  NECK: no adenopathy, no asymmetry, masses, or scars and thyroid normal to palpation  RESP: lungs clear to auscultation - no rales, rhonchi or wheezes  CV: regular rate and rhythm, normal S1 S2, no S3 or S4, no murmur, click or rub, no peripheral edema and peripheral pulses strong  ABDOMEN: soft, nontender, no hepatosplenomegaly, no masses and bowel sounds normal  MS: no gross musculoskeletal defects noted, no edema            Answers for HPI/ROS submitted by the patient on 2/25/2022  Do you check your blood pressure regularly outside of the clinic?: No  Are your blood pressures ever more than 140 on the top number (systolic) OR more than 90 on the bottom number (diastolic)? (For example, greater than 140/90): No  Are you following a low salt diet?: Yes  Frequency of checking blood sugars:: a few " times a month  What time of day are you checking your blood sugars : at bedtime  Have you had any blood sugars above 200?: No  Have you had any blood sugars below 70?: No  Hypoglycemia symptoms:: dizziness, blurred vision  Diabetic concerns:: none  Paraesthesia present:: numbness in feet, excessive thirst  How many servings of fruits and vegetables do you eat daily?: 2-3  On average, how many sweetened beverages do you drink each day (Examples: soda, juice, sweet tea, etc.  Do NOT count diet or artificially sweetened beverages)?: 0  How many minutes a day do you exercise enough to make your heart beat faster?: 60 or more  How many days a week do you exercise enough to make your heart beat faster?: 7  How many days per week do you miss taking your medication?: 0

## 2022-02-26 LAB
ALBUMIN SERPL-MCNC: 3.5 G/DL (ref 3.4–5)
ALP SERPL-CCNC: 103 U/L (ref 40–150)
ALT SERPL W P-5'-P-CCNC: 33 U/L (ref 0–70)
ANION GAP SERPL CALCULATED.3IONS-SCNC: 5 MMOL/L (ref 3–14)
AST SERPL W P-5'-P-CCNC: 16 U/L (ref 0–45)
BILIRUB SERPL-MCNC: 1.7 MG/DL (ref 0.2–1.3)
BUN SERPL-MCNC: 30 MG/DL (ref 7–30)
CALCIUM SERPL-MCNC: 9.5 MG/DL (ref 8.5–10.1)
CHLORIDE BLD-SCNC: 109 MMOL/L (ref 94–109)
CHOLEST SERPL-MCNC: 110 MG/DL
CO2 SERPL-SCNC: 26 MMOL/L (ref 20–32)
CREAT SERPL-MCNC: 1.38 MG/DL (ref 0.66–1.25)
FASTING STATUS PATIENT QL REPORTED: YES
GFR SERPL CREATININE-BSD FRML MDRD: 56 ML/MIN/1.73M2
GLUCOSE BLD-MCNC: 120 MG/DL (ref 70–99)
HDLC SERPL-MCNC: 37 MG/DL
LDLC SERPL CALC-MCNC: 56 MG/DL
NONHDLC SERPL-MCNC: 73 MG/DL
POTASSIUM BLD-SCNC: 5.1 MMOL/L (ref 3.4–5.3)
PROT SERPL-MCNC: 7.5 G/DL (ref 6.8–8.8)
PSA SERPL-MCNC: 4.09 UG/L (ref 0–4)
SODIUM SERPL-SCNC: 140 MMOL/L (ref 133–144)
TRIGL SERPL-MCNC: 87 MG/DL

## 2022-02-28 ENCOUNTER — TELEPHONE (OUTPATIENT)
Dept: FAMILY MEDICINE | Facility: CLINIC | Age: 67
End: 2022-02-28
Payer: COMMERCIAL

## 2022-02-28 NOTE — TELEPHONE ENCOUNTER
Unable to reach patient. Called home number and was told by person answering the phone that pt no longer lives at this number. Told to please stop contacting this number to reach Brian. Sent messge to call clinic via email listed in demographics.    Judi NG RN  EP Triage

## 2022-02-28 NOTE — TELEPHONE ENCOUNTER
----- Message from Kirill Beal MD sent at 2/28/2022 11:51 AM CST -----  Please notify the patient alss mail the letter.    I have reviewed your recent labs. Here are the results:      -PSA is elevated and I would like you to see a urologist about this. I have referred you to:  [facility]: [facility] [place] [phone]   [email or web address].  You can call them to set up a consultation.  -Cholesterol levels (LDL,HDL, Triglycerides) are normal.  ADVISE: rechecking in 1 year.   -Liver and gallbladder tests are normal (ALT,AST, Alk phos, bilirubin), sodium is normal, potassium is normal, calcium is normal, glucose is stable.    - kidney function is slightly elevated, keep hydration, follow up labs in 3 months.    - 3 month blood sugar test is slight elevated but still stable.      Kirill Beal MD  2/28/2022

## 2022-02-28 NOTE — LETTER
March 2, 2022      Brian Flynn  [address]  [place] [address]        Dear Brian,       I have reviewed your recent labs. Here are the results:      -PSA is elevated and I would like you to see a urologist about this. I have referred you to:  [facility]: [facility] [place] [phone]   [email or web address].  You can call them to set up a consultation.  -Cholesterol levels (LDL,HDL, Triglycerides) are normal.  ADVISE: rechecking in 1 year.   -Liver and gallbladder tests are normal (ALT,AST, Alk phos, bilirubin), sodium is normal, potassium is normal, calcium is normal, glucose is stable.    - kidney function is slightly elevated, keep hydrated, follow up labs in 3 months.    - 3 month blood sugar test is slight elevated but still stable.          Sincerely,        Kirill Beal MD

## 2022-03-02 NOTE — TELEPHONE ENCOUNTER
Called and left a message with daughter Dayan at [phone] advising we are trying to get a hold of pt and wondering if she has a number to reach him at?    Angelica ESCOBAR RN  EP Triage

## 2022-03-02 NOTE — TELEPHONE ENCOUNTER
Pt's daughter called back and confirmed the phone number is correct. She will call him and ask him to call the clinic himself and talk to triage.  Sindi Edgar,

## 2022-03-02 NOTE — TELEPHONE ENCOUNTER
Patient called back. He states that he still lives at current address and home phone is the only number available. States that they get so many spam calls his wife is just used to trying to make sure unwanted callers don't call back.     Patient given result message from Dr. Beal. Copy of labs and lab result note mailed to the patient's home address. Caitlin Jacome RN

## 2022-05-26 ENCOUNTER — OFFICE VISIT (OUTPATIENT)
Dept: UROLOGY | Facility: CLINIC | Age: 67
End: 2022-05-26
Attending: FAMILY MEDICINE
Payer: COMMERCIAL

## 2022-05-26 VITALS
HEIGHT: 69 IN | DIASTOLIC BLOOD PRESSURE: 70 MMHG | SYSTOLIC BLOOD PRESSURE: 130 MMHG | BODY MASS INDEX: 23.7 KG/M2 | WEIGHT: 160 LBS

## 2022-05-26 DIAGNOSIS — N48.89 PENILE MASS: Primary | ICD-10-CM

## 2022-05-26 DIAGNOSIS — Z11.59 ENCOUNTER FOR SCREENING FOR OTHER VIRAL DISEASES: Primary | ICD-10-CM

## 2022-05-26 DIAGNOSIS — R97.20 ELEVATED PROSTATE SPECIFIC ANTIGEN (PSA): ICD-10-CM

## 2022-05-26 LAB — PSA SERPL-MCNC: 3 UG/L (ref 0–4)

## 2022-05-26 PROCEDURE — 36415 COLL VENOUS BLD VENIPUNCTURE: CPT | Performed by: UROLOGY

## 2022-05-26 PROCEDURE — 99204 OFFICE O/P NEW MOD 45 MIN: CPT | Performed by: UROLOGY

## 2022-05-26 PROCEDURE — 84153 ASSAY OF PSA TOTAL: CPT | Performed by: UROLOGY

## 2022-05-26 RX ORDER — HEPARIN SODIUM 10000 [USP'U]/ML
5000 INJECTION, SOLUTION INTRAVENOUS; SUBCUTANEOUS
Status: CANCELLED | OUTPATIENT
Start: 2022-05-26

## 2022-05-26 ASSESSMENT — PAIN SCALES - GENERAL: PAINLEVEL: NO PAIN (0)

## 2022-05-26 NOTE — LETTER
5/26/2022       RE: Brian Flynn  [address]  [place] [address]     Dear Colleague,    Thank you for referring your patient, Brian Flynn, to the [facility] at [facility]. Please see a copy of my visit note below.          Chief Complaint:   Elevated PSA  Penile Mass         Consult or Referral:     Brian Flynn is a 67 year old male seen at the request of Dr. Beal.         History of Present Illness:    Brian Flynn is a very pleasant 67 year old male who presents with a history of elevated PSA at his most recent physical to 4.09. This has never previously been evaluated. PSA rechecked in clinic today at 3.0.    He also mentions recent history of burning with urination. This has been worsening over the past 2-3 months. He has pain to palpation to the tip of his penis, and palpable mass. He was not circumcised as a child and has long history of phimosis. He also had circumcision for painful phimosis in 2007.         Past Medical History:     Past Medical History:   Diagnosis Date     BENIGN HYPERTENSION 7/13/2007     BLIND Right Eye     since birth     Obesity, unspecified           Past Surgical History:     Past Surgical History:   Procedure Laterality Date     CIRCUMCISION,CLAMP  11/07    for Severe phimosis with balanoposthitis          Medications     Current Outpatient Medications   Medication     amLODIPine (NORVASC) 5 MG tablet     ASPIRIN NOT PRESCRIBED (INTENTIONAL)     atorvastatin (LIPITOR) 20 MG tablet     blood glucose (ACCU-CHEK SOLO PLUS) test strip     blood glucose monitoring (ACCU-CHEK SOLO PLUS) meter device kit     blood glucose monitoring (SOFTCLIX) lancets     dorzolamide-timolol (COSOPT) 2-0.5 % ophthalmic solution     lisinopril-hydrochlorothiazide (ZESTORETIC) 20-25 MG tablet     metFORMIN (GLUCOPHAGE) 500 MG tablet     metoprolol succinate ER (TOPROL-XL) 50 MG 24 hr tablet      ofloxacin (FLOXIN) 0.3 % otic solution     VITAMIN D PO     No current facility-administered medications for this visit.          Family History:     Family History   Problem Relation Age of Onset     Cancer Mother         ovarian     Hypertension Father      Cerebrovascular Disease Father         age 69     Asthma Father      Diabetes No family hx of      Breast Cancer No family hx of      Cancer - colorectal No family hx of      Prostate Cancer No family hx of      No known family history of  malignancy.         Social History:     Social History     Socioeconomic History     Marital status:      Spouse name: Yvonne     Number of children: 3     Years of education: Not on file     Highest education level: Not on file   Occupational History     Occupation: [employer]     Comment: [employer]   Tobacco Use     Smoking status: Never Smoker     Smokeless tobacco: Never Used   Substance and Sexual Activity     Alcohol use: No     Drug use: No     Sexual activity: Yes     Partners: Female   Other Topics Concern      Service No     Blood Transfusions Yes     Caffeine Concern No     Occupational Exposure Yes     Hobby Hazards No     Sleep Concern No     Stress Concern No     Weight Concern Yes     Special Diet No     Back Care No     Exercise Yes     Bike Helmet Yes     Seat Belt Yes     Self-Exams No     Parent/sibling w/ CABG, MI or angioplasty before 65F 55M? Not Asked   Social History Narrative     Not on file     Social Determinants of Health     Financial Resource Strain: Not on file   Food Insecurity: Not on file   Transportation Needs: Not on file   Physical Activity: Not on file   Stress: Not on file   Social Connections: Not on file   Intimate Partner Violence: Not on file   Housing Stability: Not on file          Allergies:   Fluticasone propionate         Review of Systems:  From intake questionnaire     Skin: negative  Eyes: negative  Ears/Nose/Throat: negative  Respiratory: No  "shortness of breath, dyspnea on exertion, cough, or hemoptysis  Cardiovascular: No chest pain or palpitations  Gastrointestinal: negative; no nausea/vomiting, constipation or diarrhea  Genitourinary: as per HPI  Musculoskeletal: negative  Neurologic: negative  Psychiatric: negative  Hematologic/Lymphatic/Immunologic: negative  Endocrine: negative         Physical Exam:     Patient is a 67 year old  male   Vitals: Blood pressure 130/70, height 1.753 m (5' 9\"), weight 72.6 kg (160 lb).  Constitutional: Body mass index is 23.63 kg/m .  Alert, no acute distress, oriented, conversant  Eyes: no scleral icterus; extraocular muscles intact, moist conjunctivae  Respiratory: no respiratory distress, or pursed lip breathing  Cardiovascular: pulses strong and intact; no obvious jugular venous distension present  Gastrointestinal: soft, nontender, no organomegaly or masses,   Musculoskeletal: extremities normal, no peripheral edema  Skin: no suspicious lesions or rashes  Neuro: Alert, oriented, speech and mentation normal  Psych: affect and mood normal, alert and oriented to person, place and time  : phallus notable for fungating, indurated mass replacing the glans. Urethral meatus not visible. Induration palpable into proximal corpora, suspicious for invasion.  No palpable inguinal lymphadenopathy  JULISSA anodular, symmetric      Labs and Pathology:    I reviewed all applicable laboratory and pathology data and went over findings with patient  Significant for   Lab Results   Component Value Date    CR 1.38 02/25/2022    CR 1.25 08/25/2021    CR 1.30 05/21/2021    CR 1.24 07/29/2020    CR 1.23 08/03/2019    CR 1.21 01/10/2019    CR 1.13 04/12/2018    CR 1.03 03/14/2018    CR 1.11 01/06/2009    CR 1.04 07/13/2007     PSA   Date Value Ref Range Status   01/10/2019 3.53 0 - 4 ug/L Final     Comment:     Assay Method:  Chemiluminescence using Siemens Vista analyzer   01/06/2009 1.59 0 - 4 ug/L Final   07/13/2007 1.56 0 - 4 ug/L Final "     Prostate Specific Antigen Screen   Date Value Ref Range Status   02/25/2022 4.09 (H) 0.00 - 4.00 ug/L Final       Outside and Past Medical records:    Review of the result(s) of each unique test - PSA, creat         Assessment and Plan:     Assessment: 67 year old male with penile mass, and elevated PSA to 4.09, which was 3.0 on recheck today.    Regarding his PSA, we discussed the utility of PSA screening.  We talked about the Pros and Cons.  We discussed the findings of the PLCO and EORTC studies.  We discussed the results of the [place] trial of treatment vs. observation in clinically detected prostate cancer as well as the results of the PIVOT trial in the context of screen-detected cancer.  We discussed the recommendations by the AUA, and USPSTF. We also discussed the significant (2-6%) and rising risk of biopsy associated sepsis. We also discussed the emerging role of MRI in the diagnosis of prostate cancer and the potential for performing MRI-Ultrasound Fusion biopsy if suspicious lesions are noted. At this point, given his PSA is unremarkable on recheck, no further workup indicated.    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 We discussed penile cancer and that the primary treatment modality is surgical excision. Specifically, given the size of this mass, would recommend partial penectomy. We discussed this operation, including expectations for recovery. Risks of procedure, including bleeding, infection, pain were discussed. We discussed that frozen sections would be sent to confirm malignancy and assess for negative margins. We reviewed that there would be penile shortening and risk of stenosis of see-meatus. He expresses understanding and elects to proceed.    Will plan CT prior to surgery to further evaluate lymph nodes.     Plan:  Schedule CT and labs  Schedule for partial penectomy and flexible  cystourethroscopy    Orders  Orders Placed This Encounter   Procedures     CT Chest/Abdomen/Pelvis w Contrast     PSA tumor marker     CBC with platelets [KES296]     Basic metabolic panel [LAB15]     Case Request: PARTIAL PENECTOMY; FLEXIBLE CYSTOURETHROSCOPY     55 total minutes spent on the date of the encounter including direct interaction with the patient, performing chart review, documentation and further activities as noted above.    Alcon Whittaker MD  Urology  [facility]

## 2022-05-26 NOTE — PROGRESS NOTES
Chief Complaint:   Elevated PSA  Penile Mass         Consult or Referral:     Brian Flynn is a 67 year old male seen at the request of Dr. Beal.         History of Present Illness:    Brian Flynn is a very pleasant 67 year old male who presents with a history of elevated PSA at his most recent physical to 4.09. This has never previously been evaluated. PSA rechecked in clinic today at 3.0.    He also mentions recent history of burning with urination. This has been worsening over the past 2-3 months. He has pain to palpation to the tip of his penis, and palpable mass. He was not circumcised as a child and has long history of phimosis. He also had circumcision for painful phimosis in 2007.         Past Medical History:     Past Medical History:   Diagnosis Date     BENIGN HYPERTENSION 7/13/2007     BLIND Right Eye     since birth     Obesity, unspecified           Past Surgical History:     Past Surgical History:   Procedure Laterality Date     CIRCUMCISION,CLAMP  11/07    for Severe phimosis with balanoposthitis          Medications     Current Outpatient Medications   Medication     amLODIPine (NORVASC) 5 MG tablet     ASPIRIN NOT PRESCRIBED (INTENTIONAL)     atorvastatin (LIPITOR) 20 MG tablet     blood glucose (ACCU-CHEK SOLO PLUS) test strip     blood glucose monitoring (ACCU-CHEK SOLO PLUS) meter device kit     blood glucose monitoring (SOFTCLIX) lancets     dorzolamide-timolol (COSOPT) 2-0.5 % ophthalmic solution     lisinopril-hydrochlorothiazide (ZESTORETIC) 20-25 MG tablet     metFORMIN (GLUCOPHAGE) 500 MG tablet     metoprolol succinate ER (TOPROL-XL) 50 MG 24 hr tablet     ofloxacin (FLOXIN) 0.3 % otic solution     VITAMIN D PO     No current facility-administered medications for this visit.          Family History:     Family History   Problem Relation Age of Onset     Cancer Mother         ovarian     Hypertension Father      Cerebrovascular Disease Father         age 69     Asthma  Father      Diabetes No family hx of      Breast Cancer No family hx of      Cancer - colorectal No family hx of      Prostate Cancer No family hx of      No known family history of  malignancy.         Social History:     Social History     Socioeconomic History     Marital status:      Spouse name: Yvonne     Number of children: 3     Years of education: Not on file     Highest education level: Not on file   Occupational History     Occupation: [employer] Maintenance     Comment: [employer]   Tobacco Use     Smoking status: Never Smoker     Smokeless tobacco: Never Used   Substance and Sexual Activity     Alcohol use: No     Drug use: No     Sexual activity: Yes     Partners: Female   Other Topics Concern      Service No     Blood Transfusions Yes     Caffeine Concern No     Occupational Exposure Yes     Hobby Hazards No     Sleep Concern No     Stress Concern No     Weight Concern Yes     Special Diet No     Back Care No     Exercise Yes     Bike Helmet Yes     Seat Belt Yes     Self-Exams No     Parent/sibling w/ CABG, MI or angioplasty before 65F 55M? Not Asked   Social History Narrative     Not on file     Social Determinants of Health     Financial Resource Strain: Not on file   Food Insecurity: Not on file   Transportation Needs: Not on file   Physical Activity: Not on file   Stress: Not on file   Social Connections: Not on file   Intimate Partner Violence: Not on file   Housing Stability: Not on file          Allergies:   Fluticasone propionate         Review of Systems:  From intake questionnaire     Skin: negative  Eyes: negative  Ears/Nose/Throat: negative  Respiratory: No shortness of breath, dyspnea on exertion, cough, or hemoptysis  Cardiovascular: No chest pain or palpitations  Gastrointestinal: negative; no nausea/vomiting, constipation or diarrhea  Genitourinary: as per HPI  Musculoskeletal: negative  Neurologic: negative  Psychiatric: negative  Hematologic/Lymphatic/Immunologic:  "negative  Endocrine: negative         Physical Exam:     Patient is a 67 year old  male   Vitals: Blood pressure 130/70, height 1.753 m (5' 9\"), weight 72.6 kg (160 lb).  Constitutional: Body mass index is 23.63 kg/m .  Alert, no acute distress, oriented, conversant  Eyes: no scleral icterus; extraocular muscles intact, moist conjunctivae  Respiratory: no respiratory distress, or pursed lip breathing  Cardiovascular: pulses strong and intact; no obvious jugular venous distension present  Gastrointestinal: soft, nontender, no organomegaly or masses,   Musculoskeletal: extremities normal, no peripheral edema  Skin: no suspicious lesions or rashes  Neuro: Alert, oriented, speech and mentation normal  Psych: affect and mood normal, alert and oriented to person, place and time  : phallus notable for fungating, indurated mass replacing the glans. Urethral meatus not visible. Induration palpable into proximal corpora, suspicious for invasion.  No palpable inguinal lymphadenopathy  JULISSA anodular, symmetric      Labs and Pathology:    I reviewed all applicable laboratory and pathology data and went over findings with patient  Significant for   Lab Results   Component Value Date    CR 1.38 02/25/2022    CR 1.25 08/25/2021    CR 1.30 05/21/2021    CR 1.24 07/29/2020    CR 1.23 08/03/2019    CR 1.21 01/10/2019    CR 1.13 04/12/2018    CR 1.03 03/14/2018    CR 1.11 01/06/2009    CR 1.04 07/13/2007     PSA   Date Value Ref Range Status   01/10/2019 3.53 0 - 4 ug/L Final     Comment:     Assay Method:  Chemiluminescence using Siemens Vista analyzer   01/06/2009 1.59 0 - 4 ug/L Final   07/13/2007 1.56 0 - 4 ug/L Final     Prostate Specific Antigen Screen   Date Value Ref Range Status   02/25/2022 4.09 (H) 0.00 - 4.00 ug/L Final       Outside and Past Medical records:    Review of the result(s) of each unique test - PSA, creat         Assessment and Plan:     Assessment: 67 year old male with penile mass, and elevated PSA to 4.09, " which was 3.0 on recheck today.    Regarding his PSA, we discussed the utility of PSA screening.  We talked about the Pros and Cons.  We discussed the findings of the PLCO and EORTC studies.  We discussed the results of the [place] trial of treatment vs. observation in clinically detected prostate cancer as well as the results of the PIVOT trial in the context of screen-detected cancer.  We discussed the recommendations by the AUA, and USPSTF. We also discussed the significant (2-6%) and rising risk of biopsy associated sepsis. We also discussed the emerging role of MRI in the diagnosis of prostate cancer and the potential for performing MRI-Ultrasound Fusion biopsy if suspicious lesions are noted. At this point, given his PSA is unremarkable on recheck, no further workup indicated.    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 We discussed penile cancer and that the primary treatment modality is surgical excision. Specifically, given the size of this mass, would recommend partial penectomy. We discussed this operation, including expectations for recovery. Risks of procedure, including bleeding, infection, pain were discussed. We discussed that frozen sections would be sent to confirm malignancy and assess for negative margins. We reviewed that there would be penile shortening and risk of stenosis of see-meatus. He expresses understanding and elects to proceed.    Will plan CT prior to surgery to further evaluate lymph nodes.     Plan:  Schedule CT and labs  Schedule for partial penectomy and flexible cystourethroscopy    Orders  Orders Placed This Encounter   Procedures     CT Chest/Abdomen/Pelvis w Contrast     PSA tumor marker     CBC with platelets [IGY405]     Basic metabolic panel [LAB15]     Case Request: PARTIAL PENECTOMY; FLEXIBLE CYSTOURETHROSCOPY     55 total minutes spent on the date of the encounter  including direct interaction with the patient, performing chart review, documentation and further activities as noted above.    Alcon Whittaker MD  Urology  [facility]

## 2022-05-27 ENCOUNTER — HOSPITAL ENCOUNTER (OUTPATIENT)
Dept: CT IMAGING | Facility: CLINIC | Age: 67
Discharge: HOME | End: 2022-05-27
Attending: UROLOGY | Admitting: UROLOGY
Payer: COMMERCIAL

## 2022-05-27 DIAGNOSIS — N48.89 PENILE MASS: ICD-10-CM

## 2022-05-27 LAB
CREAT BLD-MCNC: 1.3 MG/DL (ref 0.7–1.3)
GFR SERPL CREATININE-BSD FRML MDRD: 60 ML/MIN/1.73M2

## 2022-05-27 PROCEDURE — 82565 ASSAY OF CREATININE: CPT

## 2022-05-27 PROCEDURE — 74177 CT ABD & PELVIS W/CONTRAST: CPT

## 2022-05-27 PROCEDURE — 250N000009 HC RX 250: Performed by: UROLOGY

## 2022-05-27 PROCEDURE — 250N000011 HC RX IP 250 OP 636: Performed by: UROLOGY

## 2022-05-27 RX ORDER — IOPAMIDOL 755 MG/ML
79 INJECTION, SOLUTION INTRAVASCULAR ONCE
Status: COMPLETED | OUTPATIENT
Start: 2022-05-27 | End: 2022-05-27

## 2022-05-27 RX ADMIN — SODIUM CHLORIDE 63 ML: 9 INJECTION, SOLUTION INTRAVENOUS at 07:15

## 2022-05-27 RX ADMIN — IOPAMIDOL 79 ML: 755 INJECTION, SOLUTION INTRAVENOUS at 07:15

## 2022-05-30 PROBLEM — N48.89 PENILE MASS: Status: ACTIVE | Noted: 2022-05-30

## 2022-05-30 PROBLEM — R97.20 ELEVATED PROSTATE SPECIFIC ANTIGEN (PSA): Status: ACTIVE | Noted: 2022-05-30

## 2022-05-31 ENCOUNTER — LAB (OUTPATIENT)
Dept: URGENT CARE | Facility: URGENT CARE | Age: 67
End: 2022-05-31
Attending: UROLOGY
Payer: COMMERCIAL

## 2022-05-31 DIAGNOSIS — Z11.59 ENCOUNTER FOR SCREENING FOR OTHER VIRAL DISEASES: ICD-10-CM

## 2022-05-31 PROCEDURE — U0005 INFEC AGEN DETEC AMPLI PROBE: HCPCS

## 2022-05-31 PROCEDURE — U0003 INFECTIOUS AGENT DETECTION BY NUCLEIC ACID (DNA OR RNA); SEVERE ACUTE RESPIRATORY SYNDROME CORONAVIRUS 2 (SARS-COV-2) (CORONAVIRUS DISEASE [COVID-19]), AMPLIFIED PROBE TECHNIQUE, MAKING USE OF HIGH THROUGHPUT TECHNOLOGIES AS DESCRIBED BY CMS-2020-01-R: HCPCS

## 2022-05-31 NOTE — PROGRESS NOTES
[address] [address]  Phone: [phone]  Primary Provider: Pat Beal  Pre-op Performing Provider: PAT BEAL        PREOPERATIVE EVALUATION:  Today's date: 6/1/2022    Brian Flynn is a 67 year old male who presents for a preoperative evaluation.    Surgical Information:  Surgery/Procedure: partial penectomy  Surgery Location: [facility]  Surgeon: Panfilo  Surgery Date: 6/3/2022  Time of Surgery: 12:00 pm  Where patient plans to recover: At home with family  Fax number for surgical facility: Note does not need to be faxed, will be available electronically in Epic.    Type of Anesthesia Anticipated: General    Assessment & Plan     The proposed surgical procedure is considered INTERMEDIATE risk.    Type 2 diabetes mellitus with other specified complication, without long-term current use of insulin (H)  Stable well-controlled.  - HEMOGLOBIN A1C; Future    Morbid obesity (H)      Essential hypertension, benign  Blood pressure is usually elevated in the office.  Recheck was somewhat better.  We will keep updated his kidney function.  - Basic metabolic panel  (Ca, Cl, CO2, Creat, Gluc, K, Na, BUN); Future    Penile cancer (H)      Pre-op exam  EKG reviewed compared with the previous one.  He has stress test done as well in the past.  Currently denies any chest pains no shortness of breath.  - Hemoglobin; Future  - Basic metabolic panel  (Ca, Cl, CO2, Creat, Gluc, K, Na, BUN); Future  - EKG 12-lead complete w/read - Clinics         Risks and Recommendations:  The patient has the following additional risks and recommendations for perioperative complications:   - No identified additional risk factors other than previously addressed    Medication Instructions:  Patient is to take all scheduled medications on the day of surgery EXCEPT for modifications listed below:  Hold evening dose of metformin.  RECOMMENDATION:  APPROVAL GIVEN to proceed with  proposed procedure, without further diagnostic evaluation.          }    Subjective     HPI related to upcoming procedure: penile cancer      Preop Questions 6/1/2022   1. Have you ever had a heart attack or stroke? No   2. Have you ever had surgery on your heart or blood vessels, such as a stent placement, a coronary artery bypass, or surgery on an artery in your head, neck, heart, or legs? No   3. Do you have chest pain with activity? No   4. Do you have a history of  heart failure? No   5. Do you currently have a cold, bronchitis or symptoms of other infection? No   6. Do you have a cough, shortness of breath, or wheezing? No   7. Do you or anyone in your family have previous history of blood clots? No   8. Do you or does anyone in your family have a serious bleeding problem such as prolonged bleeding following surgeries or cuts? No   9. Have you ever had problems with anemia or been told to take iron pills? No   10. Have you had any abnormal blood loss such as black, tarry or bloody stools? No   11. Have you ever had a blood transfusion? YES -    11a. Have you ever had a transfusion reaction? No   12. Are you willing to have a blood transfusion if it is medically needed before, during, or after your surgery? Yes   13. Have you or any of your relatives ever had problems with anesthesia? No   14. Do you have sleep apnea, excessive snoring or daytime drowsiness? No   15. Do you have any artifical heart valves or other implanted medical devices like a pacemaker, defibrillator, or continuous glucose monitor? No   16. Do you have artificial joints? No   17. Are you allergic to latex? No     Health Care Directive:  Patient does not have a Health Care Directive or Living Will: no information    Preoperative Review of :   reviewed - no record of controlled substances prescribed.        Status of Chronic Conditions:  See problem list for active medical problems.  Problems all longstanding and stable, except as  noted/documented.  See ROS for pertinent symptoms related to these conditions.      Review of Systems  CONSTITUTIONAL: NEGATIVE for fever, chills, change in weight  INTEGUMENTARY/SKIN: NEGATIVE for worrisome rashes, moles or lesions  EYES: NEGATIVE for vision changes or irritation  ENT/MOUTH: NEGATIVE for ear, mouth and throat problems  RESP: NEGATIVE for significant cough or SOB  CV: NEGATIVE for chest pain, palpitations or peripheral edema  GI: NEGATIVE for nausea, abdominal pain, heartburn, or change in bowel habits  : NEGATIVE for frequency, dysuria, or hematuria  MUSCULOSKELETAL: NEGATIVE for significant arthralgias or myalgia  NEURO: NEGATIVE for weakness, dizziness or paresthesias  ENDOCRINE: NEGATIVE for temperature intolerance, skin/hair changes  HEME: NEGATIVE for bleeding problems  PSYCHIATRIC: NEGATIVE for changes in mood or affect    Patient Active Problem List    Diagnosis Date Noted     Penile mass 05/30/2022     Priority: Medium     Elevated prostate specific antigen (PSA) 05/30/2022     Priority: Medium     Hyperlipidemia LDL goal <100 05/21/2021     Priority: Medium     Diabetes mellitus, type 2 (H) 08/10/2020     Priority: Medium     Morbid obesity (H) 07/12/2018     Priority: Medium     CARDIOVASCULAR SCREENING; LDL GOAL LESS THAN 130 10/31/2010     Priority: Medium     Essential hypertension, benign 07/13/2007     Priority: Medium     Redundant prepuce and phimosis 07/13/2007     Priority: Medium      Past Medical History:   Diagnosis Date     BENIGN HYPERTENSION 7/13/2007     BLIND Right Eye     since birth     Obesity, unspecified      Past Surgical History:   Procedure Laterality Date     CIRCUMCISION,CLAMP  11/07    for Severe phimosis with balanoposthitis     Current Outpatient Medications   Medication Sig Dispense Refill     amLODIPine (NORVASC) 5 MG tablet Take 1 tablet (5 mg) by mouth daily 90 tablet 1     atorvastatin (LIPITOR) 20 MG tablet Take 1 tablet (20 mg) by mouth daily 90  "tablet 1     blood glucose (ACCU-CHEK SOLO PLUS) test strip Use to test blood sugar 1 times daily or as directed.  Ok to substitute alternative if insurance prefers. 100 strip prn     blood glucose monitoring (ACCU-CHEK SOLO PLUS) meter device kit Use to test blood sugar 1 times daily or as directed.  Ok to substitute alternative if insurance prefers. 1 kit 0     blood glucose monitoring (SOFTCLIX) lancets Use to test blood sugar 1 times daily or as directed.  Ok to substitute alternative if insurance prefers. 100 each 1     lisinopril-hydrochlorothiazide (ZESTORETIC) 20-25 MG tablet Take 1 tablet by mouth daily 90 tablet 1     metFORMIN (GLUCOPHAGE) 500 MG tablet Take 1 tab twice daily. 180 tablet 1     metoprolol succinate ER (TOPROL-XL) 50 MG 24 hr tablet Take 1 tablet (50 mg) by mouth daily 90 tablet 1     ASPIRIN NOT PRESCRIBED (INTENTIONAL) Please choose reason not prescribed from choices below. (Patient not taking: No sig reported)       VITAMIN D PO          Allergies   Allergen Reactions     Fluticasone Propionate      Sped system up for one week prior to one use        Social History     Tobacco Use     Smoking status: Never Smoker     Smokeless tobacco: Never Used   Substance Use Topics     Alcohol use: No     Family History   Problem Relation Age of Onset     Cancer Mother         ovarian     Hypertension Father      Cerebrovascular Disease Father         age 69     Asthma Father      Diabetes No family hx of      Breast Cancer No family hx of      Cancer - colorectal No family hx of      Prostate Cancer No family hx of      History   Drug Use No         Objective     BP (!) 142/70   Pulse 72   Temp 97.1  F (36.2  C) (Tympanic)   Ht 1.753 m (5' 9\")   Wt 116.6 kg (257 lb)   SpO2 95%   BMI 37.95 kg/m      Physical Exam    GENERAL APPEARANCE: healthy, alert and no distress     EYES: EOMI,  PERRL     HENT: ear canals and TM's normal and nose and mouth without ulcers or lesions     NECK: no " adenopathy, no asymmetry, masses, or scars and thyroid normal to palpation     RESP: lungs clear to auscultation - no rales, rhonchi or wheezes     CV: regular rates and rhythm, normal S1 S2, no S3 or S4 and no murmur, click or rub     ABDOMEN:  soft, nontender, no HSM or masses and bowel sounds normal     MS: extremities normal- no gross deformities noted, no evidence of inflammation in joints, FROM in all extremities.     SKIN: no suspicious lesions or rashes     NEURO: Normal strength and tone, sensory exam grossly normal, mentation intact and speech normal     PSYCH: mentation appears normal. and affect normal/bright     LYMPHATICS: No cervical adenopathy    Recent Labs   Lab Test 05/27/22  0711 02/25/22  0911 08/25/21  1020 08/25/21  1020 05/21/21  0956 11/11/20  0909 09/15/20  0917   HGB  --   --   --   --  14.5  --  14.0   PLT  --   --   --   --   --   --  202   NA  --  140  --  139 140  --   --    POTASSIUM  --  5.1  --  4.7 4.9  --   --    CR 1.3 1.38*   < > 1.25 1.30*  --   --    A1C  --  5.8*  --  5.3 7.0*   < >  --     < > = values in this interval not displayed.        Diagnostics:  No labs were ordered during this visit.   No EKG required for low risk surgery (cataract, skin procedure, breast biopsy, etc).    Revised Cardiac Risk Index (RCRI):  The patient has the following serious cardiovascular risks for perioperative complications:   - No serious cardiac risks = 0 points     RCRI Interpretation: 0 points: Class I (very low risk - 0.4% complication rate)           Signed Electronically by: Kirill Beal MD  Copy of this evaluation report is provided to requesting physician.

## 2022-05-31 NOTE — H&P (VIEW-ONLY)
[address] [address]  Phone: [phone]  Primary Provider: Pat Beal  Pre-op Performing Provider: PAT BEAL        PREOPERATIVE EVALUATION:  Today's date: 6/1/2022    Brian Flynn is a 67 year old male who presents for a preoperative evaluation.    Surgical Information:  Surgery/Procedure: partial penectomy  Surgery Location: [facility]  Surgeon: Panfilo  Surgery Date: 6/3/2022  Time of Surgery: 12:00 pm  Where patient plans to recover: At home with family  Fax number for surgical facility: Note does not need to be faxed, will be available electronically in Epic.    Type of Anesthesia Anticipated: General    Assessment & Plan     The proposed surgical procedure is considered INTERMEDIATE risk.    Type 2 diabetes mellitus with other specified complication, without long-term current use of insulin (H)  Stable well-controlled.  - HEMOGLOBIN A1C; Future    Morbid obesity (H)      Essential hypertension, benign  Blood pressure is usually elevated in the office.  Recheck was somewhat better.  We will keep updated his kidney function.  - Basic metabolic panel  (Ca, Cl, CO2, Creat, Gluc, K, Na, BUN); Future    Penile cancer (H)      Pre-op exam  EKG reviewed compared with the previous one.  He has stress test done as well in the past.  Currently denies any chest pains no shortness of breath.  - Hemoglobin; Future  - Basic metabolic panel  (Ca, Cl, CO2, Creat, Gluc, K, Na, BUN); Future  - EKG 12-lead complete w/read - Clinics         Risks and Recommendations:  The patient has the following additional risks and recommendations for perioperative complications:   - No identified additional risk factors other than previously addressed    Medication Instructions:  Patient is to take all scheduled medications on the day of surgery EXCEPT for modifications listed below:  Hold evening dose of metformin.  RECOMMENDATION:  APPROVAL GIVEN to proceed with  proposed procedure, without further diagnostic evaluation.          }    Subjective     HPI related to upcoming procedure: penile cancer      Preop Questions 6/1/2022   1. Have you ever had a heart attack or stroke? No   2. Have you ever had surgery on your heart or blood vessels, such as a stent placement, a coronary artery bypass, or surgery on an artery in your head, neck, heart, or legs? No   3. Do you have chest pain with activity? No   4. Do you have a history of  heart failure? No   5. Do you currently have a cold, bronchitis or symptoms of other infection? No   6. Do you have a cough, shortness of breath, or wheezing? No   7. Do you or anyone in your family have previous history of blood clots? No   8. Do you or does anyone in your family have a serious bleeding problem such as prolonged bleeding following surgeries or cuts? No   9. Have you ever had problems with anemia or been told to take iron pills? No   10. Have you had any abnormal blood loss such as black, tarry or bloody stools? No   11. Have you ever had a blood transfusion? YES -    11a. Have you ever had a transfusion reaction? No   12. Are you willing to have a blood transfusion if it is medically needed before, during, or after your surgery? Yes   13. Have you or any of your relatives ever had problems with anesthesia? No   14. Do you have sleep apnea, excessive snoring or daytime drowsiness? No   15. Do you have any artifical heart valves or other implanted medical devices like a pacemaker, defibrillator, or continuous glucose monitor? No   16. Do you have artificial joints? No   17. Are you allergic to latex? No     Health Care Directive:  Patient does not have a Health Care Directive or Living Will: no information    Preoperative Review of :   reviewed - no record of controlled substances prescribed.        Status of Chronic Conditions:  See problem list for active medical problems.  Problems all longstanding and stable, except as  noted/documented.  See ROS for pertinent symptoms related to these conditions.      Review of Systems  CONSTITUTIONAL: NEGATIVE for fever, chills, change in weight  INTEGUMENTARY/SKIN: NEGATIVE for worrisome rashes, moles or lesions  EYES: NEGATIVE for vision changes or irritation  ENT/MOUTH: NEGATIVE for ear, mouth and throat problems  RESP: NEGATIVE for significant cough or SOB  CV: NEGATIVE for chest pain, palpitations or peripheral edema  GI: NEGATIVE for nausea, abdominal pain, heartburn, or change in bowel habits  : NEGATIVE for frequency, dysuria, or hematuria  MUSCULOSKELETAL: NEGATIVE for significant arthralgias or myalgia  NEURO: NEGATIVE for weakness, dizziness or paresthesias  ENDOCRINE: NEGATIVE for temperature intolerance, skin/hair changes  HEME: NEGATIVE for bleeding problems  PSYCHIATRIC: NEGATIVE for changes in mood or affect    Patient Active Problem List    Diagnosis Date Noted     Penile mass 05/30/2022     Priority: Medium     Elevated prostate specific antigen (PSA) 05/30/2022     Priority: Medium     Hyperlipidemia LDL goal <100 05/21/2021     Priority: Medium     Diabetes mellitus, type 2 (H) 08/10/2020     Priority: Medium     Morbid obesity (H) 07/12/2018     Priority: Medium     CARDIOVASCULAR SCREENING; LDL GOAL LESS THAN 130 10/31/2010     Priority: Medium     Essential hypertension, benign 07/13/2007     Priority: Medium     Redundant prepuce and phimosis 07/13/2007     Priority: Medium      Past Medical History:   Diagnosis Date     BENIGN HYPERTENSION 7/13/2007     BLIND Right Eye     since birth     Obesity, unspecified      Past Surgical History:   Procedure Laterality Date     CIRCUMCISION,CLAMP  11/07    for Severe phimosis with balanoposthitis     Current Outpatient Medications   Medication Sig Dispense Refill     amLODIPine (NORVASC) 5 MG tablet Take 1 tablet (5 mg) by mouth daily 90 tablet 1     atorvastatin (LIPITOR) 20 MG tablet Take 1 tablet (20 mg) by mouth daily 90  "tablet 1     blood glucose (ACCU-CHEK SOLO PLUS) test strip Use to test blood sugar 1 times daily or as directed.  Ok to substitute alternative if insurance prefers. 100 strip prn     blood glucose monitoring (ACCU-CHEK SOLO PLUS) meter device kit Use to test blood sugar 1 times daily or as directed.  Ok to substitute alternative if insurance prefers. 1 kit 0     blood glucose monitoring (SOFTCLIX) lancets Use to test blood sugar 1 times daily or as directed.  Ok to substitute alternative if insurance prefers. 100 each 1     lisinopril-hydrochlorothiazide (ZESTORETIC) 20-25 MG tablet Take 1 tablet by mouth daily 90 tablet 1     metFORMIN (GLUCOPHAGE) 500 MG tablet Take 1 tab twice daily. 180 tablet 1     metoprolol succinate ER (TOPROL-XL) 50 MG 24 hr tablet Take 1 tablet (50 mg) by mouth daily 90 tablet 1     ASPIRIN NOT PRESCRIBED (INTENTIONAL) Please choose reason not prescribed from choices below. (Patient not taking: No sig reported)       VITAMIN D PO          Allergies   Allergen Reactions     Fluticasone Propionate      Sped system up for one week prior to one use        Social History     Tobacco Use     Smoking status: Never Smoker     Smokeless tobacco: Never Used   Substance Use Topics     Alcohol use: No     Family History   Problem Relation Age of Onset     Cancer Mother         ovarian     Hypertension Father      Cerebrovascular Disease Father         age 69     Asthma Father      Diabetes No family hx of      Breast Cancer No family hx of      Cancer - colorectal No family hx of      Prostate Cancer No family hx of      History   Drug Use No         Objective     BP (!) 142/70   Pulse 72   Temp 97.1  F (36.2  C) (Tympanic)   Ht 1.753 m (5' 9\")   Wt 116.6 kg (257 lb)   SpO2 95%   BMI 37.95 kg/m      Physical Exam    GENERAL APPEARANCE: healthy, alert and no distress     EYES: EOMI,  PERRL     HENT: ear canals and TM's normal and nose and mouth without ulcers or lesions     NECK: no " adenopathy, no asymmetry, masses, or scars and thyroid normal to palpation     RESP: lungs clear to auscultation - no rales, rhonchi or wheezes     CV: regular rates and rhythm, normal S1 S2, no S3 or S4 and no murmur, click or rub     ABDOMEN:  soft, nontender, no HSM or masses and bowel sounds normal     MS: extremities normal- no gross deformities noted, no evidence of inflammation in joints, FROM in all extremities.     SKIN: no suspicious lesions or rashes     NEURO: Normal strength and tone, sensory exam grossly normal, mentation intact and speech normal     PSYCH: mentation appears normal. and affect normal/bright     LYMPHATICS: No cervical adenopathy    Recent Labs   Lab Test 05/27/22  0711 02/25/22  0911 08/25/21  1020 08/25/21  1020 05/21/21  0956 11/11/20  0909 09/15/20  0917   HGB  --   --   --   --  14.5  --  14.0   PLT  --   --   --   --   --   --  202   NA  --  140  --  139 140  --   --    POTASSIUM  --  5.1  --  4.7 4.9  --   --    CR 1.3 1.38*   < > 1.25 1.30*  --   --    A1C  --  5.8*  --  5.3 7.0*   < >  --     < > = values in this interval not displayed.        Diagnostics:  No labs were ordered during this visit.   No EKG required for low risk surgery (cataract, skin procedure, breast biopsy, etc).    Revised Cardiac Risk Index (RCRI):  The patient has the following serious cardiovascular risks for perioperative complications:   - No serious cardiac risks = 0 points     RCRI Interpretation: 0 points: Class I (very low risk - 0.4% complication rate)           Signed Electronically by: Kirill Beal MD  Copy of this evaluation report is provided to requesting physician.

## 2022-06-01 ENCOUNTER — OFFICE VISIT (OUTPATIENT)
Dept: FAMILY MEDICINE | Facility: CLINIC | Age: 67
End: 2022-06-01
Payer: COMMERCIAL

## 2022-06-01 VITALS
HEART RATE: 72 BPM | BODY MASS INDEX: 38.06 KG/M2 | DIASTOLIC BLOOD PRESSURE: 70 MMHG | WEIGHT: 257 LBS | HEIGHT: 69 IN | SYSTOLIC BLOOD PRESSURE: 142 MMHG | OXYGEN SATURATION: 95 % | TEMPERATURE: 97.1 F

## 2022-06-01 DIAGNOSIS — E66.01 MORBID OBESITY (H): ICD-10-CM

## 2022-06-01 DIAGNOSIS — Z01.818 PRE-OP EXAM: ICD-10-CM

## 2022-06-01 DIAGNOSIS — I10 ESSENTIAL HYPERTENSION, BENIGN: ICD-10-CM

## 2022-06-01 DIAGNOSIS — C60.9 PENILE CANCER (H): ICD-10-CM

## 2022-06-01 DIAGNOSIS — E11.69 TYPE 2 DIABETES MELLITUS WITH OTHER SPECIFIED COMPLICATION, WITHOUT LONG-TERM CURRENT USE OF INSULIN (H): Primary | ICD-10-CM

## 2022-06-01 LAB
ANION GAP SERPL CALCULATED.3IONS-SCNC: 5 MMOL/L (ref 3–14)
BUN SERPL-MCNC: 30 MG/DL (ref 7–30)
CALCIUM SERPL-MCNC: 9.5 MG/DL (ref 8.5–10.1)
CHLORIDE BLD-SCNC: 108 MMOL/L (ref 94–109)
CO2 SERPL-SCNC: 28 MMOL/L (ref 20–32)
CREAT SERPL-MCNC: 1.21 MG/DL (ref 0.66–1.25)
GFR SERPL CREATININE-BSD FRML MDRD: 65 ML/MIN/1.73M2
GLUCOSE BLD-MCNC: 118 MG/DL (ref 70–99)
HBA1C MFR BLD: 5.8 % (ref 0–5.6)
HGB BLD-MCNC: 14.5 G/DL (ref 13.3–17.7)
POTASSIUM BLD-SCNC: 4.8 MMOL/L (ref 3.4–5.3)
SARS-COV-2 RNA RESP QL NAA+PROBE: NEGATIVE
SODIUM SERPL-SCNC: 141 MMOL/L (ref 133–144)

## 2022-06-01 PROCEDURE — 83036 HEMOGLOBIN GLYCOSYLATED A1C: CPT | Performed by: FAMILY MEDICINE

## 2022-06-01 PROCEDURE — 93000 ELECTROCARDIOGRAM COMPLETE: CPT | Performed by: FAMILY MEDICINE

## 2022-06-01 PROCEDURE — 36415 COLL VENOUS BLD VENIPUNCTURE: CPT | Performed by: FAMILY MEDICINE

## 2022-06-01 PROCEDURE — 99214 OFFICE O/P EST MOD 30 MIN: CPT | Performed by: FAMILY MEDICINE

## 2022-06-01 PROCEDURE — 85018 HEMOGLOBIN: CPT | Performed by: FAMILY MEDICINE

## 2022-06-01 PROCEDURE — 80048 BASIC METABOLIC PNL TOTAL CA: CPT | Performed by: FAMILY MEDICINE

## 2022-06-01 ASSESSMENT — PAIN SCALES - GENERAL: PAINLEVEL: NO PAIN (0)

## 2022-06-02 ENCOUNTER — ANESTHESIA EVENT (OUTPATIENT)
Dept: SURGERY | Facility: CLINIC | Age: 67
End: 2022-06-02
Payer: COMMERCIAL

## 2022-06-03 ENCOUNTER — TELEPHONE (OUTPATIENT)
Dept: FAMILY MEDICINE | Facility: CLINIC | Age: 67
End: 2022-06-03

## 2022-06-03 ENCOUNTER — ANESTHESIA (OUTPATIENT)
Dept: SURGERY | Facility: CLINIC | Age: 67
End: 2022-06-03
Payer: COMMERCIAL

## 2022-06-03 ENCOUNTER — HOSPITAL ENCOUNTER (OUTPATIENT)
Facility: CLINIC | Age: 67
Discharge: HOME | End: 2022-06-03
Attending: UROLOGY | Admitting: UROLOGY
Payer: COMMERCIAL

## 2022-06-03 VITALS
SYSTOLIC BLOOD PRESSURE: 157 MMHG | BODY MASS INDEX: 37.68 KG/M2 | DIASTOLIC BLOOD PRESSURE: 69 MMHG | HEIGHT: 69 IN | OXYGEN SATURATION: 93 % | HEART RATE: 82 BPM | WEIGHT: 254.4 LBS | TEMPERATURE: 97.7 F | RESPIRATION RATE: 18 BRPM

## 2022-06-03 DIAGNOSIS — N48.89 PENILE MASS: Primary | ICD-10-CM

## 2022-06-03 LAB
ABO/RH(D): NORMAL
ANTIBODY SCREEN: NEGATIVE
CREAT SERPL-MCNC: 1.25 MG/DL (ref 0.66–1.25)
GFR SERPL CREATININE-BSD FRML MDRD: 63 ML/MIN/1.73M2
GLUCOSE BLD-MCNC: 125 MG/DL (ref 70–99)
POTASSIUM BLD-SCNC: 4.1 MMOL/L (ref 3.4–5.3)
SPECIMEN EXPIRATION DATE: NORMAL

## 2022-06-03 PROCEDURE — 36415 COLL VENOUS BLD VENIPUNCTURE: CPT | Performed by: UROLOGY

## 2022-06-03 PROCEDURE — 250N000009 HC RX 250: Performed by: NURSE ANESTHETIST, CERTIFIED REGISTERED

## 2022-06-03 PROCEDURE — 370N000017 HC ANESTHESIA TECHNICAL FEE, PER MIN: Performed by: UROLOGY

## 2022-06-03 PROCEDURE — 88331 PATH CONSLTJ SURG 1 BLK 1SPC: CPT | Mod: 26 | Performed by: PATHOLOGY

## 2022-06-03 PROCEDURE — 52000 CYSTOURETHROSCOPY: CPT | Performed by: UROLOGY

## 2022-06-03 PROCEDURE — 88305 TISSUE EXAM BY PATHOLOGIST: CPT | Mod: TC | Performed by: UROLOGY

## 2022-06-03 PROCEDURE — 710N000009 HC RECOVERY PHASE 1, LEVEL 1, PER MIN: Performed by: UROLOGY

## 2022-06-03 PROCEDURE — 250N000011 HC RX IP 250 OP 636: Performed by: NURSE ANESTHETIST, CERTIFIED REGISTERED

## 2022-06-03 PROCEDURE — 82565 ASSAY OF CREATININE: CPT | Performed by: ANESTHESIOLOGY

## 2022-06-03 PROCEDURE — 54120 PARTIAL REMOVAL OF PENIS: CPT | Performed by: UROLOGY

## 2022-06-03 PROCEDURE — 258N000003 HC RX IP 258 OP 636: Performed by: ANESTHESIOLOGY

## 2022-06-03 PROCEDURE — 258N000003 HC RX IP 258 OP 636: Performed by: NURSE ANESTHETIST, CERTIFIED REGISTERED

## 2022-06-03 PROCEDURE — 250N000025 HC SEVOFLURANE, PER MIN: Performed by: UROLOGY

## 2022-06-03 PROCEDURE — 272N000001 HC OR GENERAL SUPPLY STERILE: Performed by: UROLOGY

## 2022-06-03 PROCEDURE — 88305 TISSUE EXAM BY PATHOLOGIST: CPT | Mod: 26 | Performed by: PATHOLOGY

## 2022-06-03 PROCEDURE — 250N000009 HC RX 250: Performed by: UROLOGY

## 2022-06-03 PROCEDURE — 710N000012 HC RECOVERY PHASE 2, PER MINUTE: Performed by: UROLOGY

## 2022-06-03 PROCEDURE — 250N000011 HC RX IP 250 OP 636: Performed by: UROLOGY

## 2022-06-03 PROCEDURE — 84132 ASSAY OF SERUM POTASSIUM: CPT | Performed by: ANESTHESIOLOGY

## 2022-06-03 PROCEDURE — 82947 ASSAY GLUCOSE BLOOD QUANT: CPT | Performed by: ANESTHESIOLOGY

## 2022-06-03 PROCEDURE — 88332 PATH CONSLTJ SURG EA ADD BLK: CPT | Mod: 26 | Performed by: PATHOLOGY

## 2022-06-03 PROCEDURE — 88331 PATH CONSLTJ SURG 1 BLK 1SPC: CPT | Mod: TC | Performed by: UROLOGY

## 2022-06-03 PROCEDURE — 88307 TISSUE EXAM BY PATHOLOGIST: CPT | Mod: 26 | Performed by: PATHOLOGY

## 2022-06-03 PROCEDURE — 86901 BLOOD TYPING SEROLOGIC RH(D): CPT | Performed by: UROLOGY

## 2022-06-03 PROCEDURE — 360N000076 HC SURGERY LEVEL 3, PER MIN: Performed by: UROLOGY

## 2022-06-03 PROCEDURE — 999N000141 HC STATISTIC PRE-PROCEDURE NURSING ASSESSMENT: Performed by: UROLOGY

## 2022-06-03 RX ORDER — BUPIVACAINE HYDROCHLORIDE 2.5 MG/ML
INJECTION, SOLUTION EPIDURAL; INFILTRATION; INTRACAUDAL
Status: DISCONTINUED
Start: 2022-06-03 | End: 2022-06-03 | Stop reason: HOSPADM

## 2022-06-03 RX ORDER — LABETALOL HYDROCHLORIDE 5 MG/ML
10 INJECTION, SOLUTION INTRAVENOUS
Status: DISCONTINUED | OUTPATIENT
Start: 2022-06-03 | End: 2022-06-03 | Stop reason: HOSPADM

## 2022-06-03 RX ORDER — HYDRALAZINE HYDROCHLORIDE 20 MG/ML
2.5-5 INJECTION INTRAMUSCULAR; INTRAVENOUS EVERY 10 MIN PRN
Status: DISCONTINUED | OUTPATIENT
Start: 2022-06-03 | End: 2022-06-03 | Stop reason: HOSPADM

## 2022-06-03 RX ORDER — SODIUM CHLORIDE, SODIUM LACTATE, POTASSIUM CHLORIDE, CALCIUM CHLORIDE 600; 310; 30; 20 MG/100ML; MG/100ML; MG/100ML; MG/100ML
INJECTION, SOLUTION INTRAVENOUS CONTINUOUS
Status: DISCONTINUED | OUTPATIENT
Start: 2022-06-03 | End: 2022-06-03 | Stop reason: HOSPADM

## 2022-06-03 RX ORDER — BUPIVACAINE HYDROCHLORIDE 2.5 MG/ML
INJECTION, SOLUTION EPIDURAL; INFILTRATION; INTRACAUDAL PRN
Status: DISCONTINUED | OUTPATIENT
Start: 2022-06-03 | End: 2022-06-03 | Stop reason: HOSPADM

## 2022-06-03 RX ORDER — BACITRACIN ZINC 500 [USP'U]/G
OINTMENT TOPICAL 2 TIMES DAILY
Qty: 14 G | Refills: 0 | Status: ON HOLD | OUTPATIENT
Start: 2022-06-03 | End: 2022-10-21

## 2022-06-03 RX ORDER — PROPOFOL 10 MG/ML
INJECTION, EMULSION INTRAVENOUS PRN
Status: DISCONTINUED | OUTPATIENT
Start: 2022-06-03 | End: 2022-06-03

## 2022-06-03 RX ORDER — PROPOFOL 10 MG/ML
INJECTION, EMULSION INTRAVENOUS CONTINUOUS PRN
Status: DISCONTINUED | OUTPATIENT
Start: 2022-06-03 | End: 2022-06-03

## 2022-06-03 RX ORDER — HYDROMORPHONE HCL IN WATER/PF 6 MG/30 ML
0.4 PATIENT CONTROLLED ANALGESIA SYRINGE INTRAVENOUS EVERY 5 MIN PRN
Status: DISCONTINUED | OUTPATIENT
Start: 2022-06-03 | End: 2022-06-03 | Stop reason: HOSPADM

## 2022-06-03 RX ORDER — NEOSTIGMINE METHYLSULFATE 1 MG/ML
VIAL (ML) INJECTION PRN
Status: DISCONTINUED | OUTPATIENT
Start: 2022-06-03 | End: 2022-06-03

## 2022-06-03 RX ORDER — GINSENG 100 MG
CAPSULE ORAL PRN
Status: DISCONTINUED | OUTPATIENT
Start: 2022-06-03 | End: 2022-06-03 | Stop reason: HOSPADM

## 2022-06-03 RX ORDER — AMOXICILLIN 250 MG
1-2 CAPSULE ORAL 2 TIMES DAILY
Qty: 30 TABLET | Refills: 0 | Status: SHIPPED | OUTPATIENT
Start: 2022-06-03 | End: 2022-11-15

## 2022-06-03 RX ORDER — CEFAZOLIN SODIUM/WATER 2 G/20 ML
2 SYRINGE (ML) INTRAVENOUS SEE ADMIN INSTRUCTIONS
Status: DISCONTINUED | OUTPATIENT
Start: 2022-06-03 | End: 2022-06-03 | Stop reason: HOSPADM

## 2022-06-03 RX ORDER — OXYCODONE HYDROCHLORIDE 5 MG/1
5 TABLET ORAL EVERY 6 HOURS PRN
Qty: 12 TABLET | Refills: 0 | Status: SHIPPED | OUTPATIENT
Start: 2022-06-03 | End: 2022-06-06

## 2022-06-03 RX ORDER — MAGNESIUM HYDROXIDE 1200 MG/15ML
LIQUID ORAL PRN
Status: DISCONTINUED | OUTPATIENT
Start: 2022-06-03 | End: 2022-06-03 | Stop reason: HOSPADM

## 2022-06-03 RX ORDER — GLYCOPYRROLATE 0.2 MG/ML
INJECTION, SOLUTION INTRAMUSCULAR; INTRAVENOUS PRN
Status: DISCONTINUED | OUTPATIENT
Start: 2022-06-03 | End: 2022-06-03

## 2022-06-03 RX ORDER — HEPARIN SODIUM 5000 [USP'U]/.5ML
5000 INJECTION, SOLUTION INTRAVENOUS; SUBCUTANEOUS
Status: DISCONTINUED | OUTPATIENT
Start: 2022-06-03 | End: 2022-06-03 | Stop reason: HOSPADM

## 2022-06-03 RX ORDER — HYDROMORPHONE HYDROCHLORIDE 1 MG/ML
INJECTION, SOLUTION INTRAMUSCULAR; INTRAVENOUS; SUBCUTANEOUS PRN
Status: DISCONTINUED | OUTPATIENT
Start: 2022-06-03 | End: 2022-06-03

## 2022-06-03 RX ORDER — FENTANYL CITRATE 0.05 MG/ML
25 INJECTION, SOLUTION INTRAMUSCULAR; INTRAVENOUS
Status: DISCONTINUED | OUTPATIENT
Start: 2022-06-03 | End: 2022-06-03 | Stop reason: HOSPADM

## 2022-06-03 RX ORDER — EPHEDRINE SULFATE 50 MG/ML
INJECTION, SOLUTION INTRAMUSCULAR; INTRAVENOUS; SUBCUTANEOUS PRN
Status: DISCONTINUED | OUTPATIENT
Start: 2022-06-03 | End: 2022-06-03

## 2022-06-03 RX ORDER — ONDANSETRON 2 MG/ML
4 INJECTION INTRAMUSCULAR; INTRAVENOUS EVERY 30 MIN PRN
Status: DISCONTINUED | OUTPATIENT
Start: 2022-06-03 | End: 2022-06-03 | Stop reason: HOSPADM

## 2022-06-03 RX ORDER — LIDOCAINE HYDROCHLORIDE 20 MG/ML
INJECTION, SOLUTION INFILTRATION; PERINEURAL PRN
Status: DISCONTINUED | OUTPATIENT
Start: 2022-06-03 | End: 2022-06-03

## 2022-06-03 RX ORDER — ACETAMINOPHEN 325 MG/1
650 TABLET ORAL EVERY 4 HOURS PRN
Qty: 90 TABLET | Refills: 0 | Status: SHIPPED | OUTPATIENT
Start: 2022-06-03 | End: 2022-11-15

## 2022-06-03 RX ORDER — ONDANSETRON 4 MG/1
4 TABLET, ORALLY DISINTEGRATING ORAL EVERY 30 MIN PRN
Status: DISCONTINUED | OUTPATIENT
Start: 2022-06-03 | End: 2022-06-03 | Stop reason: HOSPADM

## 2022-06-03 RX ORDER — ONDANSETRON 2 MG/ML
INJECTION INTRAMUSCULAR; INTRAVENOUS PRN
Status: DISCONTINUED | OUTPATIENT
Start: 2022-06-03 | End: 2022-06-03

## 2022-06-03 RX ORDER — MEPERIDINE HYDROCHLORIDE 25 MG/ML
12.5 INJECTION INTRAMUSCULAR; INTRAVENOUS; SUBCUTANEOUS
Status: DISCONTINUED | OUTPATIENT
Start: 2022-06-03 | End: 2022-06-03 | Stop reason: HOSPADM

## 2022-06-03 RX ORDER — LIDOCAINE HYDROCHLORIDE 10 MG/ML
INJECTION, SOLUTION EPIDURAL; INFILTRATION; INTRACAUDAL; PERINEURAL
Status: DISCONTINUED
Start: 2022-06-03 | End: 2022-06-03 | Stop reason: HOSPADM

## 2022-06-03 RX ORDER — FENTANYL CITRATE 0.05 MG/ML
50 INJECTION, SOLUTION INTRAMUSCULAR; INTRAVENOUS EVERY 5 MIN PRN
Status: DISCONTINUED | OUTPATIENT
Start: 2022-06-03 | End: 2022-06-03 | Stop reason: HOSPADM

## 2022-06-03 RX ORDER — CEFAZOLIN SODIUM/WATER 2 G/20 ML
2 SYRINGE (ML) INTRAVENOUS
Status: COMPLETED | OUTPATIENT
Start: 2022-06-03 | End: 2022-06-03

## 2022-06-03 RX ORDER — OXYCODONE HYDROCHLORIDE 5 MG/1
5 TABLET ORAL EVERY 4 HOURS PRN
Status: DISCONTINUED | OUTPATIENT
Start: 2022-06-03 | End: 2022-06-03 | Stop reason: HOSPADM

## 2022-06-03 RX ORDER — FENTANYL CITRATE 50 UG/ML
INJECTION, SOLUTION INTRAMUSCULAR; INTRAVENOUS PRN
Status: DISCONTINUED | OUTPATIENT
Start: 2022-06-03 | End: 2022-06-03

## 2022-06-03 RX ORDER — GINSENG 100 MG
CAPSULE ORAL
Status: DISCONTINUED
Start: 2022-06-03 | End: 2022-06-03 | Stop reason: HOSPADM

## 2022-06-03 RX ORDER — DEXMEDETOMIDINE HYDROCHLORIDE 4 UG/ML
INJECTION, SOLUTION INTRAVENOUS PRN
Status: DISCONTINUED | OUTPATIENT
Start: 2022-06-03 | End: 2022-06-03

## 2022-06-03 RX ADMIN — PROPOFOL 30 MCG/KG/MIN: 10 INJECTION, EMULSION INTRAVENOUS at 13:30

## 2022-06-03 RX ADMIN — LIDOCAINE HYDROCHLORIDE 60 MG: 20 INJECTION, SOLUTION INFILTRATION; PERINEURAL at 13:18

## 2022-06-03 RX ADMIN — HYDROMORPHONE HYDROCHLORIDE 0.5 MG: 1 INJECTION, SOLUTION INTRAMUSCULAR; INTRAVENOUS; SUBCUTANEOUS at 13:35

## 2022-06-03 RX ADMIN — DEXMEDETOMIDINE HYDROCHLORIDE 8 MCG: 200 INJECTION INTRAVENOUS at 14:02

## 2022-06-03 RX ADMIN — Medication 2 G: at 13:13

## 2022-06-03 RX ADMIN — PROPOFOL 130 MG: 10 INJECTION, EMULSION INTRAVENOUS at 13:18

## 2022-06-03 RX ADMIN — ROCURONIUM BROMIDE 40 MG: 50 INJECTION, SOLUTION INTRAVENOUS at 13:18

## 2022-06-03 RX ADMIN — SODIUM CHLORIDE, POTASSIUM CHLORIDE, SODIUM LACTATE AND CALCIUM CHLORIDE: 600; 310; 30; 20 INJECTION, SOLUTION INTRAVENOUS at 14:10

## 2022-06-03 RX ADMIN — Medication 5 MG: at 14:07

## 2022-06-03 RX ADMIN — ONDANSETRON 4 MG: 2 INJECTION INTRAMUSCULAR; INTRAVENOUS at 14:57

## 2022-06-03 RX ADMIN — NEOSTIGMINE METHYLSULFATE 3 MG: 1 INJECTION, SOLUTION INTRAVENOUS at 15:18

## 2022-06-03 RX ADMIN — Medication 5 MG: at 14:30

## 2022-06-03 RX ADMIN — PHENYLEPHRINE HYDROCHLORIDE 50 MCG: 10 INJECTION INTRAVENOUS at 14:33

## 2022-06-03 RX ADMIN — SODIUM CHLORIDE, POTASSIUM CHLORIDE, SODIUM LACTATE AND CALCIUM CHLORIDE: 600; 310; 30; 20 INJECTION, SOLUTION INTRAVENOUS at 12:54

## 2022-06-03 RX ADMIN — Medication 5 MG: at 14:40

## 2022-06-03 RX ADMIN — Medication 5 MG: at 14:59

## 2022-06-03 RX ADMIN — PROPOFOL 40 MG: 10 INJECTION, EMULSION INTRAVENOUS at 13:51

## 2022-06-03 RX ADMIN — Medication 5 MG: at 13:36

## 2022-06-03 RX ADMIN — PHENYLEPHRINE HYDROCHLORIDE 50 MCG: 10 INJECTION INTRAVENOUS at 14:40

## 2022-06-03 RX ADMIN — GLYCOPYRROLATE 0.4 MG: 0.2 INJECTION, SOLUTION INTRAMUSCULAR; INTRAVENOUS at 15:18

## 2022-06-03 RX ADMIN — HYDROMORPHONE HYDROCHLORIDE 0.5 MG: 1 INJECTION, SOLUTION INTRAMUSCULAR; INTRAVENOUS; SUBCUTANEOUS at 13:44

## 2022-06-03 RX ADMIN — DEXMEDETOMIDINE HYDROCHLORIDE 12 MCG: 200 INJECTION INTRAVENOUS at 13:50

## 2022-06-03 RX ADMIN — MIDAZOLAM 2 MG: 1 INJECTION INTRAMUSCULAR; INTRAVENOUS at 13:12

## 2022-06-03 RX ADMIN — FENTANYL CITRATE 100 MCG: 50 INJECTION, SOLUTION INTRAMUSCULAR; INTRAVENOUS at 13:18

## 2022-06-03 ASSESSMENT — ACTIVITIES OF DAILY LIVING (ADL)
ADLS_ACUITY_SCORE: 20
ADLS_ACUITY_SCORE: 20
ADLS_ACUITY_SCORE: 33
ADLS_ACUITY_SCORE: 20
ADLS_ACUITY_SCORE: 20

## 2022-06-03 NOTE — ANESTHESIA POSTPROCEDURE EVALUATION
Patient: Brian Flynn    Procedure: Procedure(s):  PARTIAL PENECTOMY  FLEXIBLE CYSTOURETHROSCOPY       Anesthesia Type:  General    Note:  Disposition: Outpatient   Postop Pain Control: Uneventful            Sign Out: Well controlled pain   PONV: No   Neuro/Psych: Uneventful            Sign Out: Acceptable/Baseline neuro status   Airway/Respiratory: Uneventful            Sign Out: Acceptable/Baseline resp. status   CV/Hemodynamics: Uneventful            Sign Out: Acceptable CV status; No obvious hypovolemia; No obvious fluid overload   Other NRE: NONE   DID A NON-ROUTINE EVENT OCCUR? No           Last vitals:  Vitals Value Taken Time   /64 06/03/22 1600   Temp 36.9  C (98.4  F) 06/03/22 1537   Pulse 67 06/03/22 1614   Resp 7 06/03/22 1614   SpO2 90 % 06/03/22 1614   Vitals shown include unvalidated device data.    Electronically Signed By: Toñito Weeks DO, DO  Amara 3, 2022  4:15 PM

## 2022-06-03 NOTE — BRIEF OP NOTE
[facility]    Brief Operative Note    Pre-operative diagnosis: Penile mass [N48.89]  Post-operative diagnosis Same as pre-operative diagnosis    Procedure: Procedure(s):  PARTIAL PENECTOMY  FLEXIBLE CYSTOURETHROSCOPY  Surgeon: Surgeon(s) and Role:     * Alcon Whittaker MD - Primary  Anesthesia: General   Estimated Blood Loss: Less than 50 ml    Drains:  16 Fr Ott catheter    Specimens:   ID Type Source Tests Collected by Time Destination   1 : glans penis Tissue Penis SURGICAL PATHOLOGY EXAM Alcon Whittaker MD 6/3/2022  1:39 PM    2 : DISTAL PENIS Tissue Penis SURGICAL PATHOLOGY EXAM Alcon Whittaker MD 6/3/2022  2:10 PM      Findings:   Fungating distal penile mass, partial penectomy performed.  Complications: None.  Implants: * No implants in log *    Plan:  - Discharge home  - Follow up next week for Ott removal    Josue Royal MD  Urology PGY-4

## 2022-06-03 NOTE — OR NURSING
Dr. Narnajo at bedside assessing O2 Sat level.  Pt to hang out in the recovery room until we are able to keep O2  level at more stable level

## 2022-06-03 NOTE — OP NOTE
OPERATIVE REPORT  06/03/22      PREOPERATIVE DIAGNOSIS:  Penile Mass    POSTOPERATIVE DIAGNOSIS: Penile mass    PROCEDURES PERFORMED:   1. Partial Penectomy  2. Diagnostic cystourethroscopy    STAFF SURGEONS: Dr. Alcon Whittaker MD  RESIDENT(S):  Josue Royal MD    ANESTHESIA: General    ESTIMATED BLOOD LOSS: 20 mL.     DRAINS:   16Fr choi     SIGNIFICANT FINDINGS:  Fungating mass encompassing glans and distal shaft. Partial penectomy performed. Negative margins on frozen. Normal cystourethroscopy.     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 The patient was counseled on the alternatives, risks, and benefits and elected to proceed with the above stated procedure.    DESCRIPTION OF PROCEDURE:   After verification of informed consent was obtained, the patient was brought to the operating room, and moved to the operating table. After adequate anesthesia was induced, the patient was repositioned in the supine position and prepped and draped in the usual sterile fashion. A formal timeout was performed to confirm the correct patient, procedure and operative site.      We begun by performing a wedge biopsy of the fungating distal penile mass using a 15 blade and sent this for frozen section. The preliminary pathology read was notable for marked squamous proliferation and given these findings and high suspicion for neoplasm we proceeded with partial penectomy as planned. The biopsy site was reapproximated with interrupted 4-0 monocryl sutures.  A sterile glove was placed over  the palpable tumor and silk sutures were used to secure this in place to cover the fungating penile mass at the glans. A circumferential skin incision surrounding the palpable penile mass was made along the mid-shaft.  The skin incision was carried down through the dartos layer and dissected the surrounding tissue off down to Madera's fascia.  The dorsal penile vessels were dissected free from the fascia and tied off with two 3-0 silk ties and divided just proximal to our planned area of transection.      We then transected the penis starting on the dorsal side with the corporal bodies several millimeters proximal to the proximal-most extent of palpable tumor, using an 11 blade. After arriving to corpus spongiosum we then completed our dissection of the urethra with Metzenbaum scissors, leaving approximately 1 cm of urethral stump. We sent this specimen for pathology and the proximal margin returned negative for cancer.  2-0 PDS suture in an interrupted horizontal mattress pattern were used to close the corporal bodies. The tourniquet was removed and there was no bleeding noted and hemostasis was excellent. The specimen was sent for frozen section of the proximal margins from urethra, corpora, and skin, which ultimately returned negative for malignancy. Next we spatulated the urethra for approximately 1 cm on the dorsal border at 12:00. We then fixed the urethra to penile skin ventrally using interrupted 4-0 vicryl sutures. We closed the penile skin overlying the closed corporal bodies using interrupted horizontal mattress 4-0 vicryl sutures.     We then performed diagnostic cystourethroscopy with a well-lubricated 16 Fr flexible cystoscope. Urethra was unremarkable, prostate was approximately 2 cm and non-obstructive. The bladder was normal with normal media, minimal trabeculation, no diverticula, normal ureteral orifices in their normal orthotopic position, no intravesical median lobe on retroflexion, and no tumors  or stones noted. The scope was removed and a 16 Fr coude catheter was placed with 10 mL sterile water in the balloon.     We finished with a penile block using 30 mL of 0.25% marcaine. Bacitracin was applied to the incision. Fluffs and scrotal support were placed over the penile incision. The patient was awakened from anesthesia, transferred to the postanesthesia care unit in stable condition. There were no complications.     Plan:  - Discharge home  - Follow up mid-week for catheter removal    Physician Attestation   I was present for the entire procedure between opening and closing, including all key portions of the operation.    Alcon Whittaker MD  Urology  [facility]

## 2022-06-03 NOTE — PROGRESS NOTES
OPERATIVE REPORT  06/03/22      PREOPERATIVE DIAGNOSIS:  Penile Mass    POSTOPERATIVE DIAGNOSIS: Penile mass    PROCEDURES PERFORMED:   1. Partial Penectomy  2. Diagnostic cystourethroscopy    STAFF SURGEONS: Dr. Alcon Whittaker MD  RESIDENT(S):  Josue Royal MD    ANESTHESIA: General    ESTIMATED BLOOD LOSS: 20 mL.     DRAINS:   16Fr choi     SIGNIFICANT FINDINGS:  Fungating mass encompassing glans and distal shaft. Partial penectomy performed. Negative margins on frozen. Normal cystourethroscopy.     SPECIMEN(S):  1. Penile mass (frozen)  2. Distal penis for permanent    OPERATIVE INDICATIONS:   Brian Flynn is a 67 year old male with a history of penile mass highly concerning for neoplasm which was recently found on exam for difficulty urinating. The patient was counseled on the alternatives, risks, and benefits and elected to proceed with the above stated procedure.    DESCRIPTION OF PROCEDURE:   After verification of informed consent was obtained, the patient was brought to the operating room, and moved to the operating table. After adequate anesthesia was induced, the patient was repositioned in the high lithotomy position and prepped and draped in the usual sterile fashion. A formal timeout was performed to confirm the correct patient, procedure and operative site.     We begun by performing a wedge biopsy of the fungating distal penile mass using a 15 blade and sent this for frozen section. The preliminary pathologist read was indeterminate but notable for squamous proliferation and given these findings and high suspicion for neoplasm we proceeded with partial penectomy as planned. The biopsy site was reapproximated with interrupted 4-0 monocryl sutures.  A sterile glove was placed over the palpable tumor and silk sutures were used to secure this in place to cover the fungating penile mass at the glans. A circumferential skin incision surrounding the palpable penile mass was made along the mid-shaft.   The skin incision was carried down through the dartos layer and dissected the surrounding tissue off down to Madera's fascia.  The dorsal penile vessels were dissected free from the fascia and tied off with two 3-0 silk ties and divided just proximal to our planned area of transection.     We then transected the penis starting on the dorsal side with the corporal bodies several millimeters proximal to the proximal-most extent of palpable tumor, using an 11 blade. After arriving to corpus spongiosum we then completed our dissection of the urethra with Metzenbaum scissors, leaving approximately 1 cm of urethral stump. We sent this specimen for pathology and the proximal margin returned negative for cancer.  2-0 PDS suture in an interrupted horizontal mattress pattern were used to close the corporal bodies. The tourniquet was removed and there was no bleeding noted and hemostasis was excellent. The specimen was sent for frozen section of the proximal margins, which ultimately returned negative for malignancy. Next we spatulated the urethra for approximately 1 cm on the dorsal border at 12:00. We then fixed the urethra to penile skin ventrally using interrupted 4-0 vicryl sutures. We closed the penile skin overlying the closed corporal bodies using interrupted horizontal mattress 4-0 vicryl sutures.    We then performed diagnostic cystourethroscopy with a well-lubricated 16 Fr flexible cystoscope. Urethra was unremarkable, prostate was approximately 2 cm and non-obstructive. The bladder was normal with normal media, minimal trabeculation, no diverticula, normal ureteral orifices in their normal orthotopic position, no intravesical median lobe on retroflexion, and no tumors or stones noted. The scope was removed and a 16 Fr coude catheter was placed with 10 mL sterile water in the balloon.     We then focused on closing the infrapubic incision.  This was closed in multiple layers with absorbable sutures. A MARTI drain was  left in this space. The skin was closed with interrupted 4-0 vicryl mattress sutures. We finished with a penile block using 30 mL of 0.25% marcaine. Bacitracin was applied to the incision. Fluffs and scrotal support were placed over the penile incision. The patient was awakened from anesthesia, transferred to the postanesthesia care unit in stable condition. There were no complications.     Plan:  - Discharge home  - Follow up mid-week for catheter removal

## 2022-06-03 NOTE — OR NURSING
Daughter kimberly updated. 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  Kimberly states that they are familiar with catheter care, and will be able to help.

## 2022-06-03 NOTE — ANESTHESIA CARE TRANSFER NOTE
Patient: Brian Flynn    Procedure: Procedure(s):  PARTIAL PENECTOMY  FLEXIBLE CYSTOURETHROSCOPY       Diagnosis: Penile mass [N48.89]  Diagnosis Additional Information: No value filed.    Anesthesia Type:   General     Note:    Oropharynx: oropharynx clear of all foreign objects  Level of Consciousness: drowsy  Oxygen Supplementation: face mask  Level of Supplemental Oxygen (L/min / FiO2): 6  Independent Airway: airway patency satisfactory and stable  Dentition: dentition unchanged  Vital Signs Stable: post-procedure vital signs reviewed and stable  Report to RN Given: handoff report given  Patient transferred to: PACU    Handoff Report: Identifed the Patient, Identified the Reponsible Provider, Reviewed the pertinent medical history, Discussed the surgical course, Reviewed Intra-OP anesthesia mangement and issues during anesthesia, Set expectations for post-procedure period and Allowed opportunity for questions and acknowledgement of understanding      Vitals:  Vitals Value Taken Time   /64 06/03/22 1537   Temp     Pulse 68 06/03/22 1542   Resp 12 06/03/22 1542   SpO2 98 % 06/03/22 1542   Vitals shown include unvalidated device data.    Electronically Signed By: KENN Soriano CRNA  Amara 3, 2022  3:42 PM

## 2022-06-03 NOTE — TELEPHONE ENCOUNTER
Health Care Directive received in clinic for Brian Flynn. Document has been notarized.  Document emailed to Shawnee Saldana and sent to HIM for scanning.  Caitlin Jacome RN

## 2022-06-03 NOTE — OR NURSING
Pt has been doing a great job with the incentive spirometer.  When he is using the IS, his O@ sats are high 90's.  But when he stops, his is 86-89%.  Pt up and walking laps around the unit, without difficulty.  Pt did 4 laps,and then sat back down to the recliner.  Oximeter reapplied.  O2 Sat on RA  88-95%.

## 2022-06-03 NOTE — OR NURSING
Called Anesthiologist re: O2 Sats.  Sats ranging 87-88% on RA.  Got patient up to chair, using incentive spirometer, and able to get O2 sats up to 96-97%.  Pt states he is comfortable, and feels ready to go home.  Anesthesia cleared him for discharge.

## 2022-06-03 NOTE — ANESTHESIA PREPROCEDURE EVALUATION
Anesthesia Pre-Procedure Evaluation    Patient: Brian Flynn   MRN: [id number] : 1955        Procedure : Procedure(s):  PARTIAL PENECTOMY  FLEXIBLE CYSTOURETHROSCOPY          Past Medical History:   Diagnosis Date     BENIGN HYPERTENSION 2007     BLIND Right Eye     since birth     Obesity, unspecified       Past Surgical History:   Procedure Laterality Date     CIRCUMCISION,CLAMP      for Severe phimosis with balanoposthitis      Allergies   Allergen Reactions     Fluticasone Propionate      Sped system up for one week prior to one use      Social History     Tobacco Use     Smoking status: Never Smoker     Smokeless tobacco: Never Used   Substance Use Topics     Alcohol use: No      Wt Readings from Last 1 Encounters:   22 115.4 kg (254 lb 6.4 oz)        Anesthesia Evaluation   Pt has not had prior anesthetic         ROS/MED HX  ENT/Pulmonary: Comment: Blindness right eye   (-) sleep apnea   Neurologic:  - neg neurologic ROS     Cardiovascular:     (+) Dyslipidemia hypertension-----    METS/Exercise Tolerance:     Hematologic:       Musculoskeletal:       GI/Hepatic:    (-) GERD   Renal/Genitourinary:     (+) renal disease, type: CRI,     Endo:     (+) type II DM, Not using insulin, Obesity,     Psychiatric/Substance Use:       Infectious Disease:       Malignancy:   (+) Malignancy, History of Other.    Other:            Physical Exam    Airway        Mallampati: II   TM distance: > 3 FB   Neck ROM: full   Mouth opening: > 3 cm    Respiratory Devices and Support         Dental       (+) caps      Cardiovascular   cardiovascular exam normal          Pulmonary   pulmonary exam normal                OUTSIDE LABS:  CBC:   Lab Results   Component Value Date    WBC 7.8 09/15/2020    WBC 14.5 (H) 2019    HGB 14.5 2022    HGB 14.5 2021    HCT 40.9 09/15/2020    HCT 40.3 2019     09/15/2020     2019     BMP:   Lab Results   Component Value Date    NA  141 06/01/2022     02/25/2022    POTASSIUM 4.8 06/01/2022    POTASSIUM 5.1 02/25/2022    CHLORIDE 108 06/01/2022    CHLORIDE 109 02/25/2022    CO2 28 06/01/2022    CO2 26 02/25/2022    BUN 30 06/01/2022    BUN 30 02/25/2022    CR 1.21 06/01/2022    CR 1.3 05/27/2022     (H) 06/01/2022     (H) 02/25/2022     COAGS: No results found for: PTT, INR, FIBR  POC: No results found for: BGM, HCG, HCGS  HEPATIC:   Lab Results   Component Value Date    ALBUMIN 3.5 02/25/2022    PROTTOTAL 7.5 02/25/2022    ALT 33 02/25/2022    AST 16 02/25/2022    ALKPHOS 103 02/25/2022    BILITOTAL 1.7 (H) 02/25/2022     OTHER:   Lab Results   Component Value Date    A1C 5.8 (H) 06/01/2022    ADELINE 9.5 06/01/2022    TSH 2.34 03/14/2018    CRP 3.6 01/06/2009       Anesthesia Plan    ASA Status:  2   NPO Status:  NPO Appropriate    Anesthesia Type: General.     - Airway: ETT   Induction: Intravenous, Propofol.   Maintenance: Balanced.   Techniques and Equipment:     - Airway: Video-Laryngoscope         Consents    Anesthesia Plan(s) and associated risks, benefits, and realistic alternatives discussed. Questions answered and patient/representative(s) expressed understanding.    - Discussed:     - Discussed with:  Patient         Postoperative Care    Pain management: Multi-modal analgesia.   PONV prophylaxis: Ondansetron (or other 5HT-3), Background Propofol Infusion     Comments:                Yady Pryor MD

## 2022-06-03 NOTE — ANESTHESIA PROCEDURE NOTES
Airway       Patient location during procedure: OR       Procedure Start/Stop Times: 6/3/2022 1:21 PM  Staff -        Anesthesiologist:  Marc Perez MD       CRNA: Lexie Cloud APRN CRNA       Other Anesthesia Staff: Marlon Ames RN       Performed By: SRNA and with CRNAs       Procedure performed by resident/fellow/CRNA in presence of a teaching physician.    Consent for Airway        Urgency: elective  Indications and Patient Condition       Indications for airway management: marco antonio-procedural       Induction type:intravenous       Mask difficulty assessment: 1 - vent by mask    Final Airway Details       Final airway type: endotracheal airway       Successful airway: ETT - single  Endotracheal Airway Details        ETT size (mm): 8.0       Cuffed: yes       Successful intubation technique: video laryngoscopy       VL Blade Size: Glidescope 3       Grade View of Cords: 1       Adjucts: stylet       Position: Left       Measured from: gums/teeth       Secured at (cm): 23       Bite block used: None    Post intubation assessment        Placement verified by: capnometry, equal breath sounds and chest rise        Number of attempts at approach: 1       Secured with: pink tape       Ease of procedure: easy       Dentition: Unchanged    Medication(s) Administered   Medication Administration Time: 6/3/2022 1:21 PM

## 2022-06-03 NOTE — DISCHARGE INSTRUCTIONS
Same Day Surgery Discharge Instructions for  Sedation and General Anesthesia     It's not unusual to feel dizzy, light-headed or faint for up to 24 hours after surgery or while taking pain medication.  If you have these symptoms: sit for a few minutes before standing and have someone assist you when you get up to walk or use the bathroom.    You should rest and relax for the next 24 hours. We recommend you make arrangements to have an adult stay with you for at least 24 hours after your discharge.  Avoid hazardous and strenuous activity.    DO NOT DRIVE any vehicle or operate mechanical equipment for 24 hours following the end of your surgery.  Even though you may feel normal, your reactions may be affected by the medication you have received.    Do not drink alcoholic beverages for 24 hours following surgery.     Slowly progress to your regular diet as you feel able. It's not unusual to feel nauseated and/or vomit after receiving anesthesia.  If you develop these symptoms, drink clear liquids (apple juice, ginger ale, broth, 7-up, etc. ) until you feel better.  If your nausea and vomiting persists for 24 hours, please notify your surgeon.      All narcotic pain medications, along with inactivity and anesthesia, can cause constipation. Drinking plenty of liquids and increasing fiber intake will help.    For any questions of a medical nature, call your surgeon.    Do not make important decisions for 24 hours.    If you had general anesthesia, you may have a sore throat for a couple of days related to the breathing tube used during surgery.  You may use Cepacol lozenges to help with this discomfort.  If it worsens or if you develop a fever, contact your surgeon.     If you feel your pain is not well managed with the pain medications prescribed by your surgeon, please contact your surgeon's office to let them know so they can address your concerns.       CoVid 19 Information    We want to give you information regarding  Covid. Please consult your primary care provider with any questions you might have.     Patient who have symptoms (cough, fever, or shortness of breath), need to isolate for 7 days from when symptoms started OR 72 hours after fever resolves (without fever reducing medications) AND improvement of respiratory symptoms (whichever is longer).    Isolate yourself at home (in own room/own bathroom if possible)  Do Not allow any visitors  Do Not go to work or school  Do Not go to [employer],  centers, shopping, or other public places.  Do Not shake hands.  Avoid close and intimate contact with others (hugging, kissing).  Follow CDC recommendations for household cleaning of frequently touched services.     After the initial 7 days, continue to isolate yourself from household members as much as possible. To continue decrease the risk of community spread and exposure, you and any members of your household should limit activities in public for 14 days after starting home isolation.     You can reference the following CDC link for helpful home isolation/care tips:  https://www.cdc.gov/coronavirus/2019-ncov/downloads/10Things.pdf    Protect Others:  Cover Your Mouth and Nose with a mask, disposable tissue or wash cloth to avoid spreading germs to others.  Wash your hands and face frequently with soap and water    Call Your Primary Doctor If: Breathing difficulty develops or you become worse.    For more information about COVID19 and options for caring for yourself at home, please visit the CDC website at https://www.cdc.gov/coronavirus/2019-ncov/about/steps-when-sick.html  For more options for care at [facility], please visit our website at [email or web address]      DISCHARGE INSTRUCTIONS FOR   CATHETER CARE AT HOME    Basic Catheter Care  Always wash hands before and after handling your catheter.  Use soap and water to wash the area around your catheter.  Do this procedure twice a  day.  Proper cleansing will help keep the area from becoming irritated or infected.    Leg Bag  This is a small plastic bag that collects urine draining from your catheter and then strapped around your thigh. It will need to be emptied when the bag is 1/2 to 3/4 full.     Large Drainage Bag  This bag is larger than the leg bag and holds more urine.  It is to be used while at home, especially at night.    Before you go to bed, change the leg bag to the large drainage bag.  Pinch off the catheter with your fingers and swab the connection between the catheter and leg bag with alcohol sponge.  Disconnect the leg bag and connect the large drainage bag to your catheter.  When you get into bed, arrange the drainage tubing so that it doesn t kink.  Be sure to keep the bag below the level of your bladder and allow enough slack for turning.    Cleaning Your Drainage Bags    Wash hands.  Using funnel or syringe, fill the bag half full with a solution of 1/2  vinegar and 1/2 water.  Shake bag, allowing mixture to cleanse inside of bag.  Empty out all vinegar and water mixture from your bag.  Hang bag to dry when not in use.  Clean your bags anytime you change them.      Helpful Hints  Always keep drainage bags below bladder level to insure adequate drainage.  Drink 4-6 glasses of water daily along with other fluids you normally drink to keep urine free of infection and / or clots.  If you notice no urine in your bag for 2 to 4 hours or you develop extreme discomfort in bladder area, your catheter maybe plugged.  Notify your doctor.  If you notice your urine becomes foul smelling and cloudy, notify your doctor.  Also notify your doctor if you develop fever or chills.  If you notice urine leaking around the outside of the catheter, check to be sure catheter or tubing is not kinked.  Don t use leg bag while in bed.       **If you have questions or concerns about your procedure,   call Dr. Whittaker at [phone]**

## 2022-06-03 NOTE — INTERVAL H&P NOTE
"I have reviewed the surgical (or preoperative) H&P that is linked to this encounter, and examined the patient. There are no significant changes    Clinical Conditions Present on Arrival:  Clinically Significant Risk Factors Present on Admission                  # Platelet Defect: home medication list includes an antiplatelet medication  # Obesity: Estimated body mass index is 37.95 kg/m  as calculated from the following:    Height as of 6/1/22: 1.753 m (5' 9\").    Weight as of 6/1/22: 116.6 kg (257 lb).       "

## 2022-06-04 NOTE — OR NURSING
Spoke with Dr Naranjo, for the last 30min patient has been able to maintain O2 saturation 92-95% while sitting in chair.  Per Dr Naranjo patient ok to discharge.

## 2022-06-07 LAB
PATH REPORT.COMMENTS IMP SPEC: ABNORMAL
PATH REPORT.COMMENTS IMP SPEC: YES
PATH REPORT.FINAL DX SPEC: ABNORMAL
PATH REPORT.GROSS SPEC: ABNORMAL
PATH REPORT.INTRAOP OBS SPEC DOC: ABNORMAL
PATH REPORT.MICROSCOPIC SPEC OTHER STN: ABNORMAL
PATH REPORT.RELEVANT HX SPEC: ABNORMAL
PATHOLOGY SYNOPTIC REPORT: ABNORMAL
PHOTO IMAGE: ABNORMAL

## 2022-06-08 ENCOUNTER — ALLIED HEALTH/NURSE VISIT (OUTPATIENT)
Dept: UROLOGY | Facility: CLINIC | Age: 67
End: 2022-06-08
Payer: COMMERCIAL

## 2022-06-08 DIAGNOSIS — Z79.2 PROPHYLACTIC ANTIBIOTIC: Primary | ICD-10-CM

## 2022-06-08 PROCEDURE — 51700 IRRIGATION OF BLADDER: CPT | Mod: 58

## 2022-06-08 RX ORDER — CIPROFLOXACIN 500 MG/1
500 TABLET, FILM COATED ORAL ONCE
Qty: 1 TABLET | Refills: 0 | Status: SHIPPED | OUTPATIENT
Start: 2022-06-08 | End: 2022-06-08

## 2022-06-08 NOTE — PROGRESS NOTES
Patient presents to clinic for a trial of void per MD order. Patient properly identified and procedure explained to patient. Patient's leg-bag emptied, 150cc's clear-yellow urine drained from patient's catheter. Catheter was then detached from leg-bag and sterile cysto tubing inserted into catheter opening. Via gravity 100cc's sterile water was gently instilled with brief pauses into bladder. Patient tolerated fairly well and then catheter balloon was completely deflated. Ott catheter was removed from bladder. Patient was able to successfully void 100cc's following procedure and couldn't hold any more. He started to leak at 100cc. Patient was instructed to drink plenty of water, (At least 6-8 glasses daily). Patient instructed to call office during daytime hours, otherwise go to ER if unable to urinate/difficulty emptying. Patient verbalized understanding of this and will follow-up with MD as planned. Sent in one antibiotic to patient's preferred pharmacy and instructed patient to take today.    Deanna Rodrigues LPN

## 2022-06-08 NOTE — PATIENT INSTRUCTIONS
Today, you have successfully passed a trial of void in office. This means you are going home without the catheter in place. Per MD you are instructed to drink plenty of water. If unable to urinate during office hours Mon-Friday 8am-400pm you may call the office at [phone]. During non-office hours you are instructed to go to ER. You may need to have a catheter replaced.     Thanks!

## 2022-06-09 ENCOUNTER — DOCUMENTATION ONLY (OUTPATIENT)
Dept: OTHER | Facility: CLINIC | Age: 67
End: 2022-06-09
Payer: COMMERCIAL

## 2022-06-23 ENCOUNTER — OFFICE VISIT (OUTPATIENT)
Dept: UROLOGY | Facility: CLINIC | Age: 67
End: 2022-06-23
Payer: COMMERCIAL

## 2022-06-23 VITALS
HEIGHT: 70 IN | WEIGHT: 255 LBS | BODY MASS INDEX: 36.51 KG/M2 | DIASTOLIC BLOOD PRESSURE: 70 MMHG | SYSTOLIC BLOOD PRESSURE: 130 MMHG

## 2022-06-23 DIAGNOSIS — C60.9 PENILE CANCER (H): Primary | ICD-10-CM

## 2022-06-23 DIAGNOSIS — N20.0 NEPHROLITHIASIS: ICD-10-CM

## 2022-06-23 DIAGNOSIS — N28.1 ACQUIRED RENAL CYST OF LEFT KIDNEY: ICD-10-CM

## 2022-06-23 PROCEDURE — 99024 POSTOP FOLLOW-UP VISIT: CPT | Performed by: UROLOGY

## 2022-06-23 RX ORDER — ZINC GLUCONATE 50 MG
50 TABLET ORAL DAILY
COMMUNITY

## 2022-06-23 ASSESSMENT — PAIN SCALES - GENERAL: PAINLEVEL: NO PAIN (0)

## 2022-06-23 NOTE — PROGRESS NOTES
CHIEF COMPLAINT   It was my pleasure to see Brian Flynn who is a 67 year old male for follow-up of penile cancer.      HPI  Brian Flynn is a very pleasant 67 year old male who presents with a history of penile cancer. He noted burning with urination and palpable mass. Now s/p partial penectomy 6/3/2022. Demonstrated well-differentiated, stage pT1a. No palpable nodes or visible nodes on CT. Margins negative from resection. He overall is doing well and reports improvement in his voiding.      Partial Penectomy 6/3/2022  TUMOR   Tumor Focality  Unifocal    Tumor Site  Glans    Tumor Macroscopic Features  Polypoid    Tumor Size  Greatest Dimension (Centimeters): 5.1 cm   Histologic Type  Squamous cell carcinoma, usual type    Histologic Grade  G1, well-differentiated    Tumor Thickness / Depth of Invasion  7.0 mm   Tumor Deep Borders  Pushing (broadly based)    Tumor Extent  Invades lamina propria      Invades dermis    Lymphovascular Invasion  Not identified    Perineural Invasion  Not identified    MARGINS   Margin Status for Invasive Carcinoma  All margins negative for invasive carcinoma    Closest Margin(s) to Invasive Carcinoma  Skin    Distance from Invasive Carcinoma to Closest Margin  9 mm   Margin Status for Noninvasive Carcinoma / Carcinoma in Situ  All margins negative for non-invasive carcinoma / carcinoma in situ      PATHOLOGIC STAGE CLASSIFICATION (pTNM, AJCC 8th Edition)   Reporting of pT, pN, and (when applicable) pM categories is based on information available to the pathologist at the time the report is issued. As per the AJCC (Chapter 1, 8th Ed.) it is the managing physician s responsibility to establish the final pathologic stage based upon all pertinent information, including but potentially not limited to this pathology report.   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  No metastatic disease demonstrated.  2.  3 mm distal right ureteral stone.  3.  Indeterminate lesion in the anterior left upper lobe measuring 2.3  cm. Differential includes a proteinaceous or hemorrhagic cyst vs. a  solid renal mass.    PHYSICAL EXAM  Patient is a 67 year old  male   Vitals: Blood pressure 130/70, height 1.778 m (5' 10\"), weight 115.7 kg (255 lb).  General Appearance Adult: Body mass index is 36.59 kg/m .  Alert, no acute distress, oriented  Lungs: no respiratory distress, or pursed lip breathing  Abdomen: soft, nontender, no organomegaly or masses  Back: no CVAT  Neuro: Alert, oriented, speech and mentation normal  Psych: affect and mood normal  : partial penectomy incisions healing well; see-meatus patent and healthy    ASSESSMENT and PLAN  67 year old male with penile cancer s/p partial penectomy 6/3/2022 with stage pT1a squamous cell carcinoma. No palpable nodes. Margins of resection negative. We discussed pathology today, and that with stage pT1 disease, no further surgery or treatment is indicated at this time, but that close observation is important.    We also discussed his findings on CT of small lesion in the left kidney and right distal ureteral stone, from which he is asymptomatic. Regarding the kidney, we discussed this mass was small and indeterminate in nature. Will plan CT w and w/o contrast at next visit to better define this, but at this time, observation is reasonable.  Regarding the stone, he is asymptomatic from this. If it persists on next scan, however, we discussed the possible role for ureteroscopy to address this as it does appear to be in the distal ureter, despite the lack of obstruction. He expresses understanding.     - Follow-up in 3 months with CT urogram and labs    Alcon Whittaker MD  Urology  [facility]    "

## 2022-06-23 NOTE — LETTER
6/23/2022       RE: Brian Flynn  [address]  [place] [address]     Dear Colleague,    Thank you for referring your patient, Brian Flynn, to the [facility] at [facility]. Please see a copy of my visit note below.      CHIEF COMPLAINT   It was my pleasure to see Brian Flynn who is a 67 year old male for follow-up of penile cancer.      HPI  Brian Flynn is a very pleasant 67 year old male who presents with a history of penile cancer. He noted burning with urination and palpable mass. Now s/p partial penectomy 6/3/2022. Demonstrated well-differentiated, stage pT1a. No palpable nodes or visible nodes on CT. Margins negative from resection. He overall is doing well and reports improvement in his voiding.      Partial Penectomy 6/3/2022  TUMOR   Tumor Focality  Unifocal    Tumor Site  Glans    Tumor Macroscopic Features  Polypoid    Tumor Size  Greatest Dimension (Centimeters): 5.1 cm   Histologic Type  Squamous cell carcinoma, usual type    Histologic Grade  G1, well-differentiated    Tumor Thickness / Depth of Invasion  7.0 mm   Tumor Deep Borders  Pushing (broadly based)    Tumor Extent  Invades lamina propria      Invades dermis    Lymphovascular Invasion  Not identified    Perineural Invasion  Not identified    MARGINS   Margin Status for Invasive Carcinoma  All margins negative for invasive carcinoma    Closest Margin(s) to Invasive Carcinoma  Skin    Distance from Invasive Carcinoma to Closest Margin  9 mm   Margin Status for Noninvasive Carcinoma / Carcinoma in Situ  All margins negative for non-invasive carcinoma / carcinoma in situ      PATHOLOGIC STAGE CLASSIFICATION (pTNM, AJCC 8th Edition)   Reporting of pT, pN, and (when applicable) pM categories is based on information available to the pathologist at the time the report is issued. As per the AJCC (Chapter 1, 8th Ed.) it is the managing  "physician s responsibility to establish the final pathologic stage based upon all pertinent information, including but potentially not limited to this pathology report.   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  No metastatic disease demonstrated.  2.  3 mm distal right ureteral stone.  3.  Indeterminate lesion in the anterior left upper lobe measuring 2.3  cm. Differential includes a proteinaceous or hemorrhagic cyst vs. a  solid renal mass.    PHYSICAL EXAM  Patient is a 67 year old  male   Vitals: Blood pressure 130/70, height 1.778 m (5' 10\"), weight 115.7 kg (255 lb).  General Appearance Adult: Body mass index is 36.59 kg/m .  Alert, no acute distress, oriented  Lungs: no respiratory distress, or pursed lip breathing  Abdomen: soft, nontender, no organomegaly or masses  Back: no CVAT  Neuro: Alert, oriented, speech and mentation normal  Psych: affect and mood normal  : partial penectomy incisions healing well; see-meatus patent and healthy    ASSESSMENT and PLAN  67 year old male with penile cancer s/p partial penectomy 6/3/2022 with stage pT1a squamous cell carcinoma. No palpable nodes. Margins of resection negative. We discussed pathology today, and that with stage pT1 disease, no further surgery or treatment is indicated at this time, but that close observation is important.    We also discussed his findings on CT of small lesion in the left kidney and right distal ureteral stone, from which he is asymptomatic. Regarding the kidney, we discussed this mass was small and indeterminate in nature. Will plan CT w and w/o contrast at next visit to better define this, but at this time, observation is reasonable.  Regarding the stone, he is asymptomatic from this. If it persists on next scan, " however, we discussed the possible role for ureteroscopy to address this as it does appear to be in the distal ureter, despite the lack of obstruction. He expresses understanding.     - Follow-up in 3 months with CT urogram and labs    Alcon Whittaker MD  Urology  [facility]  r

## 2022-06-29 PROBLEM — N28.1 ACQUIRED RENAL CYST OF LEFT KIDNEY: Status: ACTIVE | Noted: 2022-06-29

## 2022-06-29 PROBLEM — C60.9 PENILE CANCER (H): Status: ACTIVE | Noted: 2022-06-29

## 2022-06-29 PROBLEM — N20.0 NEPHROLITHIASIS: Status: ACTIVE | Noted: 2022-06-29

## 2022-08-07 ENCOUNTER — HEALTH MAINTENANCE LETTER (OUTPATIENT)
Age: 67
End: 2022-08-07

## 2022-08-19 ENCOUNTER — OFFICE VISIT (OUTPATIENT)
Dept: FAMILY MEDICINE | Facility: CLINIC | Age: 67
End: 2022-08-19
Payer: COMMERCIAL

## 2022-08-19 VITALS
TEMPERATURE: 97.5 F | DIASTOLIC BLOOD PRESSURE: 72 MMHG | OXYGEN SATURATION: 97 % | BODY MASS INDEX: 35.82 KG/M2 | WEIGHT: 250.2 LBS | RESPIRATION RATE: 16 BRPM | HEIGHT: 70 IN | SYSTOLIC BLOOD PRESSURE: 130 MMHG | HEART RATE: 66 BPM

## 2022-08-19 DIAGNOSIS — E11.69 TYPE 2 DIABETES MELLITUS WITH OTHER SPECIFIED COMPLICATION, WITHOUT LONG-TERM CURRENT USE OF INSULIN (H): Primary | ICD-10-CM

## 2022-08-19 DIAGNOSIS — E78.5 HYPERLIPIDEMIA LDL GOAL <130: ICD-10-CM

## 2022-08-19 DIAGNOSIS — E66.01 MORBID OBESITY (H): ICD-10-CM

## 2022-08-19 DIAGNOSIS — I10 ESSENTIAL HYPERTENSION, BENIGN: ICD-10-CM

## 2022-08-19 LAB
ANION GAP SERPL CALCULATED.3IONS-SCNC: 6 MMOL/L (ref 3–14)
BUN SERPL-MCNC: 38 MG/DL (ref 7–30)
CALCIUM SERPL-MCNC: 9.2 MG/DL (ref 8.5–10.1)
CHLORIDE BLD-SCNC: 107 MMOL/L (ref 94–109)
CO2 SERPL-SCNC: 26 MMOL/L (ref 20–32)
CREAT SERPL-MCNC: 1.44 MG/DL (ref 0.66–1.25)
ERYTHROCYTE [DISTWIDTH] IN BLOOD BY AUTOMATED COUNT: 13.6 % (ref 10–15)
GFR SERPL CREATININE-BSD FRML MDRD: 53 ML/MIN/1.73M2
GLUCOSE BLD-MCNC: 112 MG/DL (ref 70–99)
HBA1C MFR BLD: 5.6 % (ref 0–5.6)
HCT VFR BLD AUTO: 42.2 % (ref 40–53)
HGB BLD-MCNC: 14 G/DL (ref 13.3–17.7)
MCH RBC QN AUTO: 29.7 PG (ref 26.5–33)
MCHC RBC AUTO-ENTMCNC: 33.2 G/DL (ref 31.5–36.5)
MCV RBC AUTO: 89 FL (ref 78–100)
PLATELET # BLD AUTO: 191 10E3/UL (ref 150–450)
POTASSIUM BLD-SCNC: 5 MMOL/L (ref 3.4–5.3)
RBC # BLD AUTO: 4.72 10E6/UL (ref 4.4–5.9)
SODIUM SERPL-SCNC: 139 MMOL/L (ref 133–144)
WBC # BLD AUTO: 6.4 10E3/UL (ref 4–11)

## 2022-08-19 PROCEDURE — 83036 HEMOGLOBIN GLYCOSYLATED A1C: CPT | Performed by: FAMILY MEDICINE

## 2022-08-19 PROCEDURE — 80048 BASIC METABOLIC PNL TOTAL CA: CPT | Performed by: FAMILY MEDICINE

## 2022-08-19 PROCEDURE — 36415 COLL VENOUS BLD VENIPUNCTURE: CPT | Performed by: FAMILY MEDICINE

## 2022-08-19 PROCEDURE — 99214 OFFICE O/P EST MOD 30 MIN: CPT | Performed by: FAMILY MEDICINE

## 2022-08-19 PROCEDURE — 85027 COMPLETE CBC AUTOMATED: CPT | Performed by: FAMILY MEDICINE

## 2022-08-19 RX ORDER — AMLODIPINE BESYLATE 5 MG/1
5 TABLET ORAL DAILY
Qty: 90 TABLET | Refills: 1 | Status: SHIPPED | OUTPATIENT
Start: 2022-08-19 | End: 2023-03-20

## 2022-08-19 RX ORDER — ATORVASTATIN CALCIUM 20 MG/1
20 TABLET, FILM COATED ORAL DAILY
Qty: 90 TABLET | Refills: 1 | Status: SHIPPED | OUTPATIENT
Start: 2022-08-19 | End: 2023-04-19

## 2022-08-19 RX ORDER — LISINOPRIL AND HYDROCHLOROTHIAZIDE 20; 25 MG/1; MG/1
1 TABLET ORAL DAILY
Qty: 90 TABLET | Refills: 1 | Status: SHIPPED | OUTPATIENT
Start: 2022-08-19 | End: 2023-03-20

## 2022-08-19 RX ORDER — METOPROLOL SUCCINATE 50 MG/1
50 TABLET, EXTENDED RELEASE ORAL DAILY
Qty: 90 TABLET | Refills: 1 | Status: SHIPPED | OUTPATIENT
Start: 2022-08-19 | End: 2023-03-20

## 2022-08-19 ASSESSMENT — PAIN SCALES - GENERAL: PAINLEVEL: NO PAIN (0)

## 2022-08-19 NOTE — PROGRESS NOTES
"  Assessment & Plan     Essential hypertension, benign    - amLODIPine (NORVASC) 5 MG tablet; Take 1 tablet (5 mg) by mouth daily  - lisinopril-hydrochlorothiazide (ZESTORETIC) 20-25 MG tablet; Take 1 tablet by mouth daily  - metoprolol succinate ER (TOPROL XL) 50 MG 24 hr tablet; Take 1 tablet (50 mg) by mouth daily    Hyperlipidemia LDL goal <130    - atorvastatin (LIPITOR) 20 MG tablet; Take 1 tablet (20 mg) by mouth daily    Type 2 diabetes mellitus with other specified complication, without long-term current use of insulin (H)  We will follow-up on his A1c if stable we will refill his metformin overall he feeling much better.  A1c reviewed is even improved advised continue same dose of medication refilled  - lisinopril-hydrochlorothiazide (ZESTORETIC) 20-25 MG tablet; Take 1 tablet by mouth daily  - Hemoglobin A1c; Future  - Hemoglobin A1c    Morbid obesity (H)        BMI:   Estimated body mass index is 35.9 kg/m  as calculated from the following:    Height as of this encounter: 1.778 m (5' 10\").    Weight as of this encounter: 113.5 kg (250 lb 3.2 oz).           Return in about 6 months (around 2/19/2023) for Physical Exam.    Kirill Beal MD  [facility] AMY PRAOSCAR Torres is a 67 year old, presenting for the following health issues:  Diabetes    Came in here for follow-up on his high blood pressure high cholesterol and diabetes.  Patient recently has penile cancer surgery since then he is doing fine denies any concerns however he feels he is not taking much care of his blood sugar he is taking metformin.  His blood pressure has been stable for blood pressure medication.  History of Present Illness       Diabetes:   He presents for follow up of diabetes.  He is checking home blood glucose a few times a week. He checks blood glucose before meals.  Blood glucose is never over 200 and never under 70. When his blood glucose is low, the patient is asymptomatic for confusion, blurred " "vision, lethargy and reports not feeling dizzy, shaky, or weak.  He has no concerns regarding his diabetes at this time.  He is having numbness in feet.         Hypertension: He presents for follow up of hypertension.  He does check blood pressure  regularly outside of the clinic. Outpatient blood pressures have not been over 140/90. He follows a low salt diet.               Review of Systems   Constitutional, HEENT, cardiovascular, pulmonary, gi and gu systems are negative, except as otherwise noted.      Objective    /72   Pulse 66   Temp 97.5  F (36.4  C) (Tympanic)   Resp 16   Ht 1.778 m (5' 10\")   Wt 113.5 kg (250 lb 3.2 oz)   SpO2 97%   BMI 35.90 kg/m    Body mass index is 35.9 kg/m .  Physical Exam   GENERAL: healthy, alert and no distress  NECK: no adenopathy, no asymmetry, masses, or scars and thyroid normal to palpation  RESP: lungs clear to auscultation - no rales, rhonchi or wheezes  CV: regular rate and rhythm, normal S1 S2, no S3 or S4, no murmur, click or rub, no peripheral edema and peripheral pulses strong  ABDOMEN: soft, nontender, no hepatosplenomegaly, no masses and bowel sounds normal  MS: no gross musculoskeletal defects noted, no edema                .  ..  "

## 2022-09-19 ENCOUNTER — HOSPITAL ENCOUNTER (OUTPATIENT)
Dept: CT IMAGING | Facility: CLINIC | Age: 67
Discharge: HOME | End: 2022-09-19
Attending: UROLOGY | Admitting: UROLOGY
Payer: COMMERCIAL

## 2022-09-19 DIAGNOSIS — N28.1 ACQUIRED RENAL CYST OF LEFT KIDNEY: ICD-10-CM

## 2022-09-19 DIAGNOSIS — C60.9 PENILE CANCER (H): ICD-10-CM

## 2022-09-19 DIAGNOSIS — N20.0 NEPHROLITHIASIS: ICD-10-CM

## 2022-09-19 LAB
CREAT BLD-MCNC: 1.3 MG/DL (ref 0.7–1.3)
GFR SERPL CREATININE-BSD FRML MDRD: 60 ML/MIN/1.73M2

## 2022-09-19 PROCEDURE — 82565 ASSAY OF CREATININE: CPT

## 2022-09-19 PROCEDURE — 250N000011 HC RX IP 250 OP 636: Performed by: UROLOGY

## 2022-09-19 PROCEDURE — 74178 CT ABD&PLV WO CNTR FLWD CNTR: CPT

## 2022-09-19 PROCEDURE — 250N000009 HC RX 250: Performed by: UROLOGY

## 2022-09-19 RX ORDER — IOPAMIDOL 755 MG/ML
122 INJECTION, SOLUTION INTRAVASCULAR ONCE
Status: COMPLETED | OUTPATIENT
Start: 2022-09-19 | End: 2022-09-19

## 2022-09-19 RX ADMIN — IOPAMIDOL 122 ML: 755 INJECTION, SOLUTION INTRAVENOUS at 08:57

## 2022-09-19 RX ADMIN — SODIUM CHLORIDE 81 ML: 9 INJECTION, SOLUTION INTRAVENOUS at 08:58

## 2022-09-22 ENCOUNTER — OFFICE VISIT (OUTPATIENT)
Dept: UROLOGY | Facility: CLINIC | Age: 67
End: 2022-09-22
Payer: COMMERCIAL

## 2022-09-22 VITALS
SYSTOLIC BLOOD PRESSURE: 150 MMHG | HEIGHT: 70 IN | WEIGHT: 248 LBS | BODY MASS INDEX: 35.5 KG/M2 | DIASTOLIC BLOOD PRESSURE: 70 MMHG

## 2022-09-22 DIAGNOSIS — N28.89 LEFT RENAL MASS: Primary | ICD-10-CM

## 2022-09-22 DIAGNOSIS — C60.9 PENILE CANCER (H): ICD-10-CM

## 2022-09-22 DIAGNOSIS — N20.1 RIGHT URETERAL STONE: ICD-10-CM

## 2022-09-22 PROCEDURE — 99215 OFFICE O/P EST HI 40 MIN: CPT | Performed by: UROLOGY

## 2022-09-22 ASSESSMENT — PAIN SCALES - GENERAL: PAINLEVEL: NO PAIN (0)

## 2022-09-22 NOTE — PROGRESS NOTES
CHIEF COMPLAINT  It was my pleasure to see Brian Flynn who is a 67 year old male for follow-up of penile cancer, ureteral stone, renal mass.      HPI  Brian Flynn is a very pleasant 67 year old male who presents with a history of penile cancer. He noted burning with urination and palpable mass. Now s/p partial penectomy 6/3/2022. Demonstrated well-differentiated, stage pT1a. No palpable nodes or visible nodes on CT. Margins negative from resection. He overall is doing well and reports improvement in his voiding.     He returns today for follow-up. Most recent CT now demonstrating persistent 3 mm right ureteral stone, without hydronephrosis. He is asymptomatic with no hematuria. He also has an apparent growing left upper pole renal mass, suspicious for RCC. These were each noted on prior scans. Stone has not moved, and mass has grown some since his previous CT.    Creat 1.44  HGB 14.0    CT Urogram 9/19/2022  IMPRESSION:   1.  3.9 cm enhancing mass upper pole of the left kidney, suspicious  for renal cell carcinoma.  2.  Stable 3 mm distal right ureteral calculus, without  hydronephrosis.     Partial Penectomy 6/3/2022       TUMOR   Tumor Focality   Unifocal    Tumor Site   Glans    Tumor Macroscopic Features   Polypoid    Tumor Size   Greatest Dimension (Centimeters): 5.1 cm   Histologic Type   Squamous cell carcinoma, usual type    Histologic Grade   G1, well-differentiated    Tumor Thickness / Depth of Invasion   7.0 mm   Tumor Deep Borders   Pushing (broadly based)    Tumor Extent   Invades lamina propria        Invades dermis    Lymphovascular Invasion   Not identified    Perineural Invasion   Not identified    MARGINS   Margin Status for Invasive Carcinoma   All margins negative for invasive carcinoma    Closest Margin(s) to Invasive Carcinoma   Skin    Distance from Invasive Carcinoma to Closest Margin   9 mm   Margin Status for Noninvasive Carcinoma / Carcinoma in Situ   All margins negative for  "non-invasive carcinoma / carcinoma in situ            PATHOLOGIC STAGE CLASSIFICATION (pTNM, AJCC 8th Edition)   Reporting of pT, pN, and (when applicable) pM categories is based on information available to the pathologist at the time the report is issued. As per the AJCC (Chapter 1, 8th Ed.) it is the managing physician s responsibility to establish the final pathologic stage based upon all pertinent information, including but potentially not limited to this pathology report.   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  No metastatic disease demonstrated.  2.  3 mm distal right ureteral stone.  3.  Indeterminate lesion in the anterior left upper lobe measuring 2.3  cm. Differential includes a proteinaceous or hemorrhagic cyst vs. a  solid renal mass.    PHYSICAL EXAM  Patient is a 67 year old  male   Vitals: Blood pressure (!) 150/70, height 1.778 m (5' 10\"), weight 112.5 kg (248 lb).  General Appearance Adult: Body mass index is 35.58 kg/m .  Alert, no acute distress, oriented  Lungs: no respiratory distress, or pursed lip breathing  Abdomen: soft, nontender, no organomegaly or masses  Back: no CVAT  Neuro: Alert, oriented, speech and mentation normal  Psych: affect and mood normal  : no evidence of disease recurrence. Rene-meatus healthy without evidence of disease recurrence    Outside and Past Medical records:  Review of the result(s) of each unique test - CT, creat HGB    ASSESSMENT and PLAN  67 year old male with penile cancer, ureteral stone, and left renal mass.    Penile Cancer - s/p partial penectomy 6/3/2022 with stage pT1a squamous cell carcinoma. No palpable nodes. Margins of resection negative. CT with no evidence of disease recurrence.    Renal Mass - We had an extensive discussion " about the significance of a localized, enhancing kidney mass.  We discussed that approximately 80% of enhancing renal masses turn out to be kidney cancer upon removal, while 20% are found to be benign.  Although certain features such as size, gender and tumor characteristics can change these risks.    We discussed the role of renal mass biopsy including the fact that renal mass biopsy is safe with current techniques, it can be inaccurate and it can be non-diagnostic.  Furthermore, the majority of patients find they ultimately need to proceed with treatment anyway.      We discussed the options for treatment of a localized kidney mass including active surveillance, thermal ablation, and surgical extirpation.  Surgical removal has the best track record and close to a 99% chance of cure for T1a lesions compared to 90% cure with thermal ablation and is generally preferred.  Furthermore, thermal ablation is not optimal for larger masses or centrally located masses.    We discussed the role of observation and the low risk of metastases associated with lesions less than 2-3 cm in size and the potential for slow/stable growth in many cases.  However, the unpredictable natural history of these lesions was mentioned as a drawback with this approach and the lack of effective therapy in the event of systemic progression.  In general, this approach is most favored for those with limited life expectancy and/or those with indeterminate (< 2-3 cm) renal masses.    Furthermore, we discussed the relative merits of partial nephrectomy vs. radical nephrectomy and the theoretic basis behind maximal nephron preservation.  There is some data suggesting there are long term survival advantages and equivalent cancer control with nephron sparing surgery.  We also discussed the advantages and disadvantages and roles of open surgery vs. laparoscopic (and Da Christine assisted) surgery. Risks and benefits of robotic partial nephrectomy were  discussed in detail. Risks of surgery including bleeding, infection, MI, stroke, DVT/PE, bowel injury and injury to other surrounding structures were discussed. In addition, we discussed potential for positive surgical margins, urine leak, vascular injury, and potential need for radical nephrectomy or conversion to an open procedure.      The anticipated post-operative course was explained as well as expectations for recovery. All questions were answered.  A written informed consent will be finalized on the morning of the procedure.    - Ureteral Stone - While he remains asymptomatic from this, the small, 3 mm presumed distal ureteral stone has not moved. We discussed that this may be a phlebolith next to the ureter, but appears on imaging to be most consistent with a ureteral stone. We discussed that given planned partial nephrectomy on the left, it would be important to clear this from the right ureter, to avoid renal function issues marco antonio-operatively. We discussed the role for ureteroscopy to fragment and/or remove stone. Risks of procedure were discussed, including bleeding, infection, pain, and possible stent. He elects to proceed with this, prior to left partial nephrectomy.      - Schedule right ureteroscopy with laser lithotripsy and stone extraction, possible ureteral stent  - Schedule left robotic partial nephrectomy    45 minutes spent on the date of the encounter doing chart review, history and exam, documentation and further activities as noted above.    Alcon Whittaker MD  Urology  [facility]

## 2022-09-28 ENCOUNTER — PREP FOR PROCEDURE (OUTPATIENT)
Dept: UROLOGY | Facility: CLINIC | Age: 67
End: 2022-09-28

## 2022-10-18 ENCOUNTER — LAB (OUTPATIENT)
Dept: URGENT CARE | Facility: URGENT CARE | Age: 67
End: 2022-10-18
Payer: COMMERCIAL

## 2022-10-18 DIAGNOSIS — Z20.822 ENCOUNTER FOR LABORATORY TESTING FOR COVID-19 VIRUS: ICD-10-CM

## 2022-10-18 LAB — SARS-COV-2 RNA RESP QL NAA+PROBE: NEGATIVE

## 2022-10-18 PROCEDURE — U0003 INFECTIOUS AGENT DETECTION BY NUCLEIC ACID (DNA OR RNA); SEVERE ACUTE RESPIRATORY SYNDROME CORONAVIRUS 2 (SARS-COV-2) (CORONAVIRUS DISEASE [COVID-19]), AMPLIFIED PROBE TECHNIQUE, MAKING USE OF HIGH THROUGHPUT TECHNOLOGIES AS DESCRIBED BY CMS-2020-01-R: HCPCS

## 2022-10-18 PROCEDURE — U0005 INFEC AGEN DETEC AMPLI PROBE: HCPCS

## 2022-10-19 ENCOUNTER — HOSPITAL ENCOUNTER (OUTPATIENT)
Dept: CARDIOLOGY | Facility: CLINIC | Age: 67
Discharge: HOME | End: 2022-10-19
Attending: INTERNAL MEDICINE | Admitting: INTERNAL MEDICINE
Payer: COMMERCIAL

## 2022-10-19 ENCOUNTER — OFFICE VISIT (OUTPATIENT)
Dept: FAMILY MEDICINE | Facility: CLINIC | Age: 67
End: 2022-10-19
Payer: COMMERCIAL

## 2022-10-19 VITALS
OXYGEN SATURATION: 95 % | HEART RATE: 80 BPM | DIASTOLIC BLOOD PRESSURE: 84 MMHG | TEMPERATURE: 97.8 F | WEIGHT: 259 LBS | HEIGHT: 70 IN | BODY MASS INDEX: 37.08 KG/M2 | SYSTOLIC BLOOD PRESSURE: 138 MMHG | RESPIRATION RATE: 16 BRPM

## 2022-10-19 DIAGNOSIS — Z01.818 PREOPERATIVE CLEARANCE: ICD-10-CM

## 2022-10-19 DIAGNOSIS — N28.89 LEFT RENAL MASS: ICD-10-CM

## 2022-10-19 DIAGNOSIS — N20.1 RIGHT URETERAL STONE: ICD-10-CM

## 2022-10-19 DIAGNOSIS — C60.9 PENILE CANCER (H): ICD-10-CM

## 2022-10-19 DIAGNOSIS — R94.31 ABNORMAL ELECTROCARDIOGRAM (ECG) (EKG): ICD-10-CM

## 2022-10-19 DIAGNOSIS — E11.69 TYPE 2 DIABETES MELLITUS WITH OTHER SPECIFIED COMPLICATION, WITHOUT LONG-TERM CURRENT USE OF INSULIN (H): ICD-10-CM

## 2022-10-19 DIAGNOSIS — I10 ESSENTIAL HYPERTENSION, BENIGN: ICD-10-CM

## 2022-10-19 DIAGNOSIS — Z01.818 PREOPERATIVE CLEARANCE: Primary | ICD-10-CM

## 2022-10-19 DIAGNOSIS — Z23 NEED FOR VACCINATION: ICD-10-CM

## 2022-10-19 LAB
ALBUMIN SERPL-MCNC: 3.7 G/DL (ref 3.4–5)
ALP SERPL-CCNC: 112 U/L (ref 40–150)
ALT SERPL W P-5'-P-CCNC: 30 U/L (ref 0–70)
ANION GAP SERPL CALCULATED.3IONS-SCNC: 4 MMOL/L (ref 3–14)
AST SERPL W P-5'-P-CCNC: 22 U/L (ref 0–45)
BILIRUB SERPL-MCNC: 1.5 MG/DL (ref 0.2–1.3)
BUN SERPL-MCNC: 31 MG/DL (ref 7–30)
CALCIUM SERPL-MCNC: 9.7 MG/DL (ref 8.5–10.1)
CHLORIDE BLD-SCNC: 107 MMOL/L (ref 94–109)
CO2 SERPL-SCNC: 30 MMOL/L (ref 20–32)
CREAT SERPL-MCNC: 1.22 MG/DL (ref 0.66–1.25)
ERYTHROCYTE [DISTWIDTH] IN BLOOD BY AUTOMATED COUNT: 13.2 % (ref 10–15)
GFR SERPL CREATININE-BSD FRML MDRD: 65 ML/MIN/1.73M2
GLUCOSE BLD-MCNC: 111 MG/DL (ref 70–99)
HCT VFR BLD AUTO: 43.8 % (ref 40–53)
HGB BLD-MCNC: 14.9 G/DL (ref 13.3–17.7)
LVEF ECHO: NORMAL
MCH RBC QN AUTO: 29.8 PG (ref 26.5–33)
MCHC RBC AUTO-ENTMCNC: 34 G/DL (ref 31.5–36.5)
MCV RBC AUTO: 88 FL (ref 78–100)
PLATELET # BLD AUTO: 178 10E3/UL (ref 150–450)
POTASSIUM BLD-SCNC: 5.3 MMOL/L (ref 3.4–5.3)
PROT SERPL-MCNC: 7.8 G/DL (ref 6.8–8.8)
RBC # BLD AUTO: 5 10E6/UL (ref 4.4–5.9)
SODIUM SERPL-SCNC: 141 MMOL/L (ref 133–144)
WBC # BLD AUTO: 7.6 10E3/UL (ref 4–11)

## 2022-10-19 PROCEDURE — 85027 COMPLETE CBC AUTOMATED: CPT | Performed by: INTERNAL MEDICINE

## 2022-10-19 PROCEDURE — 36415 COLL VENOUS BLD VENIPUNCTURE: CPT | Performed by: INTERNAL MEDICINE

## 2022-10-19 PROCEDURE — 999N000208 ECHOCARDIOGRAM COMPLETE

## 2022-10-19 PROCEDURE — 93306 TTE W/DOPPLER COMPLETE: CPT | Mod: 26 | Performed by: INTERNAL MEDICINE

## 2022-10-19 PROCEDURE — 80053 COMPREHEN METABOLIC PANEL: CPT | Performed by: INTERNAL MEDICINE

## 2022-10-19 PROCEDURE — 93000 ELECTROCARDIOGRAM COMPLETE: CPT | Performed by: INTERNAL MEDICINE

## 2022-10-19 PROCEDURE — 255N000002 HC RX 255 OP 636: Performed by: INTERNAL MEDICINE

## 2022-10-19 PROCEDURE — 99215 OFFICE O/P EST HI 40 MIN: CPT | Performed by: INTERNAL MEDICINE

## 2022-10-19 RX ADMIN — HUMAN ALBUMIN MICROSPHERES AND PERFLUTREN 9 ML: 10; .22 INJECTION, SOLUTION INTRAVENOUS at 15:33

## 2022-10-19 ASSESSMENT — PAIN SCALES - GENERAL: PAINLEVEL: NO PAIN (0)

## 2022-10-19 NOTE — PROGRESS NOTES
[address] [address]  Phone: [phone]  Primary Provider: Kirill Beal  Pre-op Performing Provider: HOLLI LUNDBERG      PREOPERATIVE EVALUATION:  Today's date: 10/19/2022    Brian Flynn is a 67 year old male who presents for a preoperative evaluation.    Surgical Information:  Surgery/Procedure: CYSTOSCOPY RIGHT URETEROSCOPY HOLMIUM LASER LITHOTRIPSY RIGHT RETROGRADES RIGHT STENT PLACEMENT,   Surgery Location: [facility]  Surgeon: pete  Surgery Date: 10/21  Time of Surgery: 0730  Where patient plans to recover: At home with family  Fax number for surgical facility: Note does not need to be faxed, will be available electronically in Epic.    Type of Anesthesia Anticipated: General    Assessment & Plan     The proposed surgical procedure is considered INTERMEDIATE risk.    Assessment and Plan  1. Preoperative clearance  2. Right ureteral stone  Pt is here for CYSTOSCOPY RIGHT URETEROSCOPY HOLMIUM LASER LITHOTRIPSY RIGHT RETROGRADES RIGHT STENT PLACEMENT for Right ureteral stone under general anesthesia. Will do pertinent work up.   - EKG 12-lead complete w/read - Clinics  - Echocardiogram Complete; Future  - Comprehensive metabolic panel (BMP + Alb, Alk Phos, ALT, AST, Total. Bili, TP); Future  - CBC with platelets; Future    3. Essential hypertension, benign  Borderline elevated BP on my recheck. Continue current 3 antihypertensives.     4. Left renal mass  5. Penile cancer (H)  Upcoming surgery of Partial Nephrectomy in 11/2022. Records reviewed.     6. Type 2 diabetes mellitus with other specified complication, without long-term current use of insulin (H)  Well controlled, Continue current Metformin with holding parameters before surgery explained. Last A1C in 8/2022 which was normal at 5.6 on current Metformin.     7. Abnormal electrocardiogram (ECG) (EKG)  Persistently abnormal EKG in the past too in 6/2022 . No recent  Echocardiogram done. Does have abnormal MPI stress test in 2019 with Ischemia. Denies any symptoms of chest pain or SOB.   UPDATE -- Echocardiogram report is normal. Approval given for the procedure.  - Echocardiogram Complete; Future    8. Need for vaccination  Pt declined, will do after the surgery.        Patient Instructions     As discussed , please do pertinent work up for preoperative clearance.     Given abnormal EKG will check Echocardiogram.    Please hold Metformin on the day of surgery as you will be fasting.     ===============  Preparing for Your Surgery  Getting started  A nurse will call you to review your health history and instructions. They will give you an arrival time based on your scheduled surgery time. Please be ready to share:    Your doctor's clinic name and phone number    Your medical, surgical and anesthesia history    A list of allergies and sensitivities    A list of medicines, including herbal treatments and over-the-counter drugs    Whether the patient has a legal guardian (ask how to send us the papers in advance)  Please tell us if you're pregnant--or if there's any chance you might be pregnant. Some surgeries may injure a fetus (unborn baby), so they require a pregnancy test. Surgeries that are safe for a fetus don't always need a test, and you can choose whether to have one.   If you have a child who's having surgery, please ask for a copy of Preparing for Your Child's Surgery.    Preparing for surgery  1. Within 10 to 30 days of surgery: Have a pre-op exam (sometimes called an H&P, or History and Physical). This can be done at a clinic or pre-operative center.  ? If you're having a , you may not need this exam. Talk to your care team.  2. At your pre-op exam, talk to your care team about all medicines you take. If you need to stop any medicines before surgery, ask when to start taking them again.  ? We do this for your safety. Many medicines can make you bleed too much  during surgery. Some change how well surgery (anesthesia) drugs work.  3. Call your insurance company to let them know you're having surgery. (If you don't have insurance, call [phone].)  4. Call your clinic if there's any change in your health. This includes signs of a cold or flu (sore throat, runny nose, cough, rash, fever). It also includes a scrape or scratch near the surgery site.  5. If you have questions on the day of surgery, call your hospital or surgery center.  COVID testing  You may need to be tested for COVID-19 before having surgery. If so, we will give you instructions (or click here).  Eating and drinking guidelines  For your safety: Unless your surgeon tells you otherwise, follow the guidelines below.    Eat and drink as usual until 8 hours before surgery. After that, no food or milk.    Drink clear liquids until 2 hours before surgery. These are liquids you can see through, like water, Gatorade and Propel Water. You may also have black coffee and tea (no cream or milk).    Nothing by mouth within 2 hours of surgery. This includes gum, candy and breath mints.    If you drink alcohol: Stop drinking it the night before surgery.    If your care team tells you to take medicine on the morning of surgery, it's okay to take it with a sip of water.  Preventing infection  1. Shower or bathe the night before and morning of your surgery. Follow the instructions your clinic gave you. (If no instructions, use regular soap.)  2. Don't shave or clip hair near your surgery site. We'll remove the hair if needed.  3. Don't smoke or vape the morning of surgery. You may chew nicotine gum up to 2 hours before surgery. A nicotine patch is okay.  ? Note: Some surgeries require you to completely quit smoking and nicotine. Check with your surgeon.  4. Your care team will make every effort to keep you safe from infection. We will:  ? Clean our hands often with soap and water (or an alcohol-based hand rub).  ? Clean the  skin at your surgery site with a special soap that kills germs.  ? Give you a special gown to keep you warm. (Cold raises the risk of infection.)  ? Wear special hair covers, masks, gowns and gloves during surgery.  ? Give antibiotic medicine, if prescribed. Not all surgeries need antibiotics.  What to bring on the day of surgery  1. Photo ID and insurance card  2. Copy of your health care directive, if you have one  3. Glasses and hearing aides (bring cases)  ? You can't wear contacts during surgery  4. Inhaler and eye drops, if you use them (tell us about these when you arrive)  5. CPAP machine or breathing device, if you use them  6. A few personal items, if spending the night  7. If you have . . .  ? A pacemaker, ICD (cardiac defibrillator) or other implant: Bring the ID card.  ? An implanted stimulator: Bring the remote control.  ? A legal guardian: Bring a copy of the certified (court-stamped) guardianship papers.  Please remove any jewelry, including body piercings. Leave jewelry and other valuables at home.  If you're going home the day of surgery    You must have a responsible adult drive you home. They should stay with you overnight as well.    If you don't have someone to stay with you, and you aren't safe to go home alone, we may keep you overnight. Insurance often won't pay for this.  After surgery  If it's hard to control your pain or you need more pain medicine, please call your surgeon's office.  Questions?   If you have any questions for your care team, list them here: _________________________________________________________________________________________________________________________________________________________________________ ____________________________________ ____________________________________ ____________________________________  For informational purposes only. Not to replace the advice of your health care provider. Copyright   2003, 2019 [facility]. All rights  reserved. Clinically reviewed by Erlinda De La Cruz MD. [employer] [id number] - REV 07/22.      Return in about 3 months (around 1/19/2023), or if symptoms worsen or fail to improve, for Annual Wellness Exam.    Flor Smyth MD  [facility]      Risks and Recommendations:  The patient has the following additional risks and recommendations for perioperative complications:   - Consult Hospitalist / IM to assist with post-op medical management  Cardiovascular:   - Anxiety induced HTN   Diabetes:  - Patient is not on insulin therapy: regular NPO guidelines can be followed.   - Obesity with Snoring , though pt states that he had sleep study in the past and negative for SAI.    Medication Instructions:  Patient is to take all scheduled medications on the day of surgery EXCEPT for modifications listed below:   - Beta Blockers: Continue taking on the day of surgery.   - Calcium Channel Blockers: May be continued on the day of surgery.   - Diuretics: May continue due to Uncontrolled HTN with anxiety   - metformin: HOLD day of surgery.    RECOMMENDATION:  APPROVAL GIVEN to proceed with proposed procedure, without further diagnostic evaluation.    Review of external notes as documented above   Independent interpretation of a test performed by another physician/other qualified health care professional (not separately reported) - EKG    40 minutes spent on the date of the encounter doing chart review, review of outside records, review of test results, interpretation of tests, patient visit and documentation         Subjective     HPI related to upcoming procedure:     Preop Questions 10/19/2022   1. Have you ever had a heart attack or stroke? No   2. Have you ever had surgery on your heart or blood vessels, such as a stent placement, a coronary artery bypass, or surgery on an artery in your head, neck, heart, or legs? No   3. Do you have chest pain with activity? No   4. Do you have a history of  heart  failure? No   5. Do you currently have a cold, bronchitis or symptoms of other infection? No   6. Do you have a cough, shortness of breath, or wheezing? No   7. Do you or anyone in your family have previous history of blood clots? No   8. Do you or does anyone in your family have a serious bleeding problem such as prolonged bleeding following surgeries or cuts? No   9. Have you ever had problems with anemia or been told to take iron pills? No   10. Have you had any abnormal blood loss such as black, tarry or bloody stools? No   11. Have you ever had a blood transfusion? YES    11a. Have you ever had a transfusion reaction? No   12. Are you willing to have a blood transfusion if it is medically needed before, during, or after your surgery? Yes   13. Have you or any of your relatives ever had problems with anesthesia? No   14. Do you have sleep apnea, excessive snoring or daytime drowsiness? No   15. Do you have any artifical heart valves or other implanted medical devices like a pacemaker, defibrillator, or continuous glucose monitor? No   16. Do you have artificial joints? No   17. Are you allergic to latex? No       Health Care Directive:  Patient has a Health Care Directive on file      Preoperative Review of :   reviewed - controlled substances prescribed by other outside provider(s).    Status of Chronic Conditions:  See problem list for active medical problems.  Problems all longstanding and stable, except as noted/documented.  See ROS for pertinent symptoms related to these conditions.      Review of Systems  CONSTITUTIONAL: NEGATIVE for fever, chills, change in weight  INTEGUMENTARY/SKIN: NEGATIVE for worrisome rashes, moles or lesions  EYES: NEGATIVE for vision changes or irritation  ENT/MOUTH: NEGATIVE for ear, mouth and throat problems  RESP: NEGATIVE for significant cough or SOB  CV: NEGATIVE for chest pain, palpitations or peripheral edema  GI: NEGATIVE for nausea, abdominal pain, heartburn, or  change in bowel habits  : NEGATIVE for frequency, dysuria, or hematuria  MUSCULOSKELETAL: NEGATIVE for significant arthralgias or myalgia  NEURO: NEGATIVE for weakness, dizziness or paresthesias  ENDOCRINE: NEGATIVE for temperature intolerance, skin/hair changes  HEME: NEGATIVE for bleeding problems  PSYCHIATRIC: NEGATIVE for changes in mood or affect    Patient Active Problem List    Diagnosis Date Noted     Left renal mass 09/22/2022     Priority: Medium     Right ureteral stone 09/22/2022     Priority: Medium     Penile cancer (H) 06/29/2022     Priority: Medium     Acquired renal cyst of left kidney 06/29/2022     Priority: Medium     Nephrolithiasis 06/29/2022     Priority: Medium     Penile mass 05/30/2022     Priority: Medium     Elevated prostate specific antigen (PSA) 05/30/2022     Priority: Medium     Hyperlipidemia LDL goal <100 05/21/2021     Priority: Medium     Diabetes mellitus, type 2 (H) 08/10/2020     Priority: Medium     Morbid obesity (H) 07/12/2018     Priority: Medium     CARDIOVASCULAR SCREENING; LDL GOAL LESS THAN 130 10/31/2010     Priority: Medium     Essential hypertension, benign 07/13/2007     Priority: Medium     Redundant prepuce and phimosis 07/13/2007     Priority: Medium      Past Medical History:   Diagnosis Date     BENIGN HYPERTENSION 7/13/2007     BLIND Right Eye     since birth     Obesity, unspecified      Past Surgical History:   Procedure Laterality Date     CIRCUMCISION,CLAMP  11/07    for Severe phimosis with balanoposthitis     CYSTOSCOPY FLEXIBLE N/A 6/3/2022    Procedure: FLEXIBLE CYSTOURETHROSCOPY;  Surgeon: Alcon Whittaker MD;  Location:  OR     PENECTOMY N/A 6/3/2022    Procedure: PARTIAL PENECTOMY;  Surgeon: Alcon Whittaker MD;  Location:  OR     Current Outpatient Medications   Medication Sig Dispense Refill     amLODIPine (NORVASC) 5 MG tablet Take 1 tablet (5 mg) by mouth daily 90 tablet 1     atorvastatin (LIPITOR) 20 MG tablet  Take 1 tablet (20 mg) by mouth daily 90 tablet 1     blood glucose (ACCU-CHEK SOLO PLUS) test strip Use to test blood sugar 1 times daily or as directed.  Ok to substitute alternative if insurance prefers. 100 strip prn     blood glucose monitoring (ACCU-CHEK SOLO PLUS) meter device kit Use to test blood sugar 1 times daily or as directed.  Ok to substitute alternative if insurance prefers. 1 kit 0     blood glucose monitoring (SOFTCLIX) lancets Use to test blood sugar 1 times daily or as directed.  Ok to substitute alternative if insurance prefers. 100 each 1     lisinopril-hydrochlorothiazide (ZESTORETIC) 20-25 MG tablet Take 1 tablet by mouth daily 90 tablet 1     metFORMIN (GLUCOPHAGE) 500 MG tablet Take 1 tab twice daily. 180 tablet 1     metoprolol succinate ER (TOPROL XL) 50 MG 24 hr tablet Take 1 tablet (50 mg) by mouth daily 90 tablet 1     VITAMIN D PO        zinc gluconate 50 MG tablet Take 50 mg by mouth daily       acetaminophen (TYLENOL) 325 MG tablet Take 2 tablets (650 mg) by mouth every 4 hours as needed for mild pain (Patient not taking: No sig reported) 90 tablet 0     ASPIRIN NOT PRESCRIBED (INTENTIONAL) Please choose reason not prescribed from choices below. (Patient not taking: No sig reported)       bacitracin 500 UNIT/GM external ointment Apply topically 2 times daily Apply to penis incision twice daily for 7 days (Patient not taking: No sig reported) 14 g 0     senna-docusate (SENOKOT-S/PERICOLACE) 8.6-50 MG tablet Take 1-2 tablets by mouth 2 times daily (Patient not taking: No sig reported) 30 tablet 0       Allergies   Allergen Reactions     Fluticasone Propionate      Sped system up for one week prior to one use        Social History     Tobacco Use     Smoking status: Never     Smokeless tobacco: Never   Substance Use Topics     Alcohol use: No     Family History   Problem Relation Age of Onset     Cancer Mother         ovarian     Hypertension Father      Cerebrovascular Disease  "Father         age 69     Asthma Father      Diabetes No family hx of      Breast Cancer No family hx of      Cancer - colorectal No family hx of      Prostate Cancer No family hx of      History   Drug Use No         Objective     /84   Pulse 80   Temp 97.8  F (36.6  C) (Temporal)   Resp 16   Ht 1.778 m (5' 10\")   Wt 117.5 kg (259 lb)   SpO2 95%   BMI 37.16 kg/m      Physical Exam    GENERAL APPEARANCE: healthy, alert and no distress     EYES: EOMI,  PERRL     HENT: ear canals and TM's normal and nose and mouth without ulcers or lesions     NECK: no adenopathy, no asymmetry, masses, or scars and thyroid normal to palpation     RESP: lungs clear to auscultation - no rales, rhonchi or wheezes     CV: regular rates and rhythm, normal S1 S2, no S3 or S4 and no murmur, click or rub     ABDOMEN:  soft, nontender, no HSM or masses and bowel sounds normal     MS: extremities normal- no gross deformities noted, no evidence of inflammation in joints, FROM in all extremities.     SKIN: no suspicious lesions or rashes     NEURO: Normal strength and tone, sensory exam grossly normal, mentation intact and speech normal     PSYCH: mentation appears normal. and affect normal/bright     LYMPHATICS: No cervical adenopathy    Recent Labs   Lab Test 09/19/22  0846 08/19/22  0904 06/03/22  1116 06/01/22  0935   HGB  --  14.0  --  14.5   PLT  --  191  --   --    NA  --  139  --  141   POTASSIUM  --  5.0 4.1 4.8   CR 1.3 1.44* 1.25 1.21   A1C  --  5.6  --  5.8*        Diagnostics:  Labs pending at this time.  Results will be reviewed when available.  Recent Results (from the past 720 hour(s))   Asymptomatic COVID-19 Virus (Coronavirus) by PCR Nose    Collection Time: 10/18/22  8:44 AM    Specimen: Nose; Swab   Result Value Ref Range    SARS CoV2 PCR Negative Negative      EKG required for Age, past abnormal STress test , General anesthesia , Intermediate risk surgery and not completed in the last 90 days.   EKG: appears " normal, NSR, no LVH by voltage criteria, ischemic changes noted in Anterolateral leads also seen in the past , unchanged from previous tracings    Revised Cardiac Risk Index (RCRI):  The patient has the following serious cardiovascular risks for perioperative complications:   - No serious cardiac risks = 0 points     RCRI Interpretation: 0 points: Class I (very low risk - 0.4% complication rate)           Signed Electronically by: Flor Smyth MD  Copy of this evaluation report is provided to requesting physician.

## 2022-10-19 NOTE — PATIENT INSTRUCTIONS
As discussed , please do pertinent work up for preoperative clearance.     Given abnormal EKG will check Echocardiogram.    Please hold Metformin on the day of surgery as you will be fasting.     ===============  Preparing for Your Surgery  Getting started  A nurse will call you to review your health history and instructions. They will give you an arrival time based on your scheduled surgery time. Please be ready to share:  Your doctor's clinic name and phone number  Your medical, surgical and anesthesia history  A list of allergies and sensitivities  A list of medicines, including herbal treatments and over-the-counter drugs  Whether the patient has a legal guardian (ask how to send us the papers in advance)  Please tell us if you're pregnant--or if there's any chance you might be pregnant. Some surgeries may injure a fetus (unborn baby), so they require a pregnancy test. Surgeries that are safe for a fetus don't always need a test, and you can choose whether to have one.   If you have a child who's having surgery, please ask for a copy of Preparing for Your Child's Surgery.    Preparing for surgery  Within 10 to 30 days of surgery: Have a pre-op exam (sometimes called an H&P, or History and Physical). This can be done at a clinic or pre-operative center.  If you're having a , you may not need this exam. Talk to your care team.  At your pre-op exam, talk to your care team about all medicines you take. If you need to stop any medicines before surgery, ask when to start taking them again.  We do this for your safety. Many medicines can make you bleed too much during surgery. Some change how well surgery (anesthesia) drugs work.  Call your insurance company to let them know you're having surgery. (If you don't have insurance, call [phone].)  Call your clinic if there's any change in your health. This includes signs of a cold or flu (sore throat, runny nose, cough, rash, fever). It also includes a scrape  or scratch near the surgery site.  If you have questions on the day of surgery, call your hospital or surgery center.  COVID testing  You may need to be tested for COVID-19 before having surgery. If so, we will give you instructions (or click here).  Eating and drinking guidelines  For your safety: Unless your surgeon tells you otherwise, follow the guidelines below.  Eat and drink as usual until 8 hours before surgery. After that, no food or milk.  Drink clear liquids until 2 hours before surgery. These are liquids you can see through, like water, Gatorade and Propel Water. You may also have black coffee and tea (no cream or milk).  Nothing by mouth within 2 hours of surgery. This includes gum, candy and breath mints.  If you drink alcohol: Stop drinking it the night before surgery.  If your care team tells you to take medicine on the morning of surgery, it's okay to take it with a sip of water.  Preventing infection  Shower or bathe the night before and morning of your surgery. Follow the instructions your clinic gave you. (If no instructions, use regular soap.)  Don't shave or clip hair near your surgery site. We'll remove the hair if needed.  Don't smoke or vape the morning of surgery. You may chew nicotine gum up to 2 hours before surgery. A nicotine patch is okay.  Note: Some surgeries require you to completely quit smoking and nicotine. Check with your surgeon.  Your care team will make every effort to keep you safe from infection. We will:  Clean our hands often with soap and water (or an alcohol-based hand rub).  Clean the skin at your surgery site with a special soap that kills germs.  Give you a special gown to keep you warm. (Cold raises the risk of infection.)  Wear special hair covers, masks, gowns and gloves during surgery.  Give antibiotic medicine, if prescribed. Not all surgeries need antibiotics.  What to bring on the day of surgery  Photo ID and insurance card  Copy of your health care directive,  if you have one  Glasses and hearing aides (bring cases)  You can't wear contacts during surgery  Inhaler and eye drops, if you use them (tell us about these when you arrive)  CPAP machine or breathing device, if you use them  A few personal items, if spending the night  If you have . . .  A pacemaker, ICD (cardiac defibrillator) or other implant: Bring the ID card.  An implanted stimulator: Bring the remote control.  A legal guardian: Bring a copy of the certified (court-stamped) guardianship papers.  Please remove any jewelry, including body piercings. Leave jewelry and other valuables at home.  If you're going home the day of surgery  You must have a responsible adult drive you home. They should stay with you overnight as well.  If you don't have someone to stay with you, and you aren't safe to go home alone, we may keep you overnight. Insurance often won't pay for this.  After surgery  If it's hard to control your pain or you need more pain medicine, please call your surgeon's office.  Questions?   If you have any questions for your care team, list them here: _________________________________________________________________________________________________________________________________________________________________________ ____________________________________ ____________________________________ ____________________________________  For informational purposes only. Not to replace the advice of your health care provider. Copyright   2003, 2019 [employer]. All rights reserved. Clinically reviewed by Erlinda De La Cruz MD. [employer] [id number] - REV 07/22.

## 2022-10-20 ENCOUNTER — TELEPHONE (OUTPATIENT)
Dept: FAMILY MEDICINE | Facility: CLINIC | Age: 67
End: 2022-10-20

## 2022-10-20 NOTE — TELEPHONE ENCOUNTER
Shannon from [facility] calling requesting pt's pre- op visit note from 10/19/22 needs an addendum to include ECHO results and if pt is cleared for surgery tomorrow. Please advise.    Judi NG RN  EP Triage

## 2022-10-21 ENCOUNTER — HOSPITAL ENCOUNTER (OUTPATIENT)
Facility: CLINIC | Age: 67
Discharge: HOME | End: 2022-10-21
Attending: UROLOGY | Admitting: UROLOGY
Payer: COMMERCIAL

## 2022-10-21 ENCOUNTER — APPOINTMENT (OUTPATIENT)
Dept: GENERAL RADIOLOGY | Facility: CLINIC | Age: 67
End: 2022-10-21
Attending: UROLOGY
Payer: COMMERCIAL

## 2022-10-21 ENCOUNTER — ANESTHESIA EVENT (OUTPATIENT)
Dept: SURGERY | Facility: CLINIC | Age: 67
End: 2022-10-21
Payer: COMMERCIAL

## 2022-10-21 ENCOUNTER — ANESTHESIA (OUTPATIENT)
Dept: SURGERY | Facility: CLINIC | Age: 67
End: 2022-10-21
Payer: COMMERCIAL

## 2022-10-21 VITALS
SYSTOLIC BLOOD PRESSURE: 139 MMHG | RESPIRATION RATE: 14 BRPM | WEIGHT: 258.8 LBS | OXYGEN SATURATION: 96 % | DIASTOLIC BLOOD PRESSURE: 62 MMHG | TEMPERATURE: 97.6 F | HEART RATE: 65 BPM | HEIGHT: 69 IN | BODY MASS INDEX: 38.33 KG/M2

## 2022-10-21 DIAGNOSIS — N20.0 NEPHROLITHIASIS: ICD-10-CM

## 2022-10-21 DIAGNOSIS — N20.1 RIGHT URETERAL STONE: Primary | ICD-10-CM

## 2022-10-21 LAB
ABO/RH(D): NORMAL
ANION GAP SERPL CALCULATED.3IONS-SCNC: 3 MMOL/L (ref 3–14)
ANTIBODY SCREEN: NEGATIVE
BUN SERPL-MCNC: 26 MG/DL (ref 7–30)
CALCIUM SERPL-MCNC: 9 MG/DL (ref 8.5–10.1)
CHLORIDE BLD-SCNC: 110 MMOL/L (ref 94–109)
CO2 SERPL-SCNC: 27 MMOL/L (ref 20–32)
CREAT SERPL-MCNC: 1.19 MG/DL (ref 0.66–1.25)
GFR SERPL CREATININE-BSD FRML MDRD: 67 ML/MIN/1.73M2
GLUCOSE BLD-MCNC: 133 MG/DL (ref 70–99)
POTASSIUM BLD-SCNC: 4 MMOL/L (ref 3.4–5.3)
SODIUM SERPL-SCNC: 140 MMOL/L (ref 133–144)
SPECIMEN EXPIRATION DATE: NORMAL

## 2022-10-21 PROCEDURE — 710N000009 HC RECOVERY PHASE 1, LEVEL 1, PER MIN: Performed by: UROLOGY

## 2022-10-21 PROCEDURE — 360N000076 HC SURGERY LEVEL 3, PER MIN: Performed by: UROLOGY

## 2022-10-21 PROCEDURE — 250N000025 HC SEVOFLURANE, PER MIN: Performed by: UROLOGY

## 2022-10-21 PROCEDURE — 250N000011 HC RX IP 250 OP 636: Performed by: ANESTHESIOLOGY

## 2022-10-21 PROCEDURE — 250N000009 HC RX 250: Performed by: ANESTHESIOLOGY

## 2022-10-21 PROCEDURE — 74420 UROGRAPHY RTRGR +-KUB: CPT | Mod: 26 | Performed by: UROLOGY

## 2022-10-21 PROCEDURE — 255N000002 HC RX 255 OP 636: Performed by: UROLOGY

## 2022-10-21 PROCEDURE — 258N000003 HC RX IP 258 OP 636: Performed by: ANESTHESIOLOGY

## 2022-10-21 PROCEDURE — 710N000012 HC RECOVERY PHASE 2, PER MINUTE: Performed by: UROLOGY

## 2022-10-21 PROCEDURE — C1769 GUIDE WIRE: HCPCS | Performed by: UROLOGY

## 2022-10-21 PROCEDURE — 36415 COLL VENOUS BLD VENIPUNCTURE: CPT | Performed by: UROLOGY

## 2022-10-21 PROCEDURE — 999N000179 XR SURGERY CARM FLUORO LESS THAN 5 MIN W STILLS

## 2022-10-21 PROCEDURE — 250N000011 HC RX IP 250 OP 636: Performed by: UROLOGY

## 2022-10-21 PROCEDURE — 370N000017 HC ANESTHESIA TECHNICAL FEE, PER MIN: Performed by: UROLOGY

## 2022-10-21 PROCEDURE — 86850 RBC ANTIBODY SCREEN: CPT | Performed by: UROLOGY

## 2022-10-21 PROCEDURE — 80048 BASIC METABOLIC PNL TOTAL CA: CPT | Performed by: UROLOGY

## 2022-10-21 PROCEDURE — 999N000141 HC STATISTIC PRE-PROCEDURE NURSING ASSESSMENT: Performed by: UROLOGY

## 2022-10-21 PROCEDURE — 272N000001 HC OR GENERAL SUPPLY STERILE: Performed by: UROLOGY

## 2022-10-21 PROCEDURE — 82365 CALCULUS SPECTROSCOPY: CPT | Performed by: UROLOGY

## 2022-10-21 PROCEDURE — 52352 CYSTOURETERO W/STONE REMOVE: CPT | Mod: RT | Performed by: UROLOGY

## 2022-10-21 PROCEDURE — 86901 BLOOD TYPING SEROLOGIC RH(D): CPT | Performed by: UROLOGY

## 2022-10-21 RX ORDER — FENTANYL CITRATE 0.05 MG/ML
25 INJECTION, SOLUTION INTRAMUSCULAR; INTRAVENOUS EVERY 5 MIN PRN
Status: DISCONTINUED | OUTPATIENT
Start: 2022-10-21 | End: 2022-10-21 | Stop reason: HOSPADM

## 2022-10-21 RX ORDER — ONDANSETRON 2 MG/ML
INJECTION INTRAMUSCULAR; INTRAVENOUS PRN
Status: DISCONTINUED | OUTPATIENT
Start: 2022-10-21 | End: 2022-10-21

## 2022-10-21 RX ORDER — PROPOFOL 10 MG/ML
INJECTION, EMULSION INTRAVENOUS PRN
Status: DISCONTINUED | OUTPATIENT
Start: 2022-10-21 | End: 2022-10-21

## 2022-10-21 RX ORDER — OXYCODONE HYDROCHLORIDE 5 MG/1
5-10 TABLET ORAL EVERY 4 HOURS PRN
Qty: 15 TABLET | Refills: 0 | Status: SHIPPED | OUTPATIENT
Start: 2022-10-21 | End: 2022-11-15

## 2022-10-21 RX ORDER — LIDOCAINE HYDROCHLORIDE 20 MG/ML
INJECTION, SOLUTION INFILTRATION; PERINEURAL PRN
Status: DISCONTINUED | OUTPATIENT
Start: 2022-10-21 | End: 2022-10-21

## 2022-10-21 RX ORDER — FENTANYL CITRATE 50 UG/ML
INJECTION, SOLUTION INTRAMUSCULAR; INTRAVENOUS PRN
Status: DISCONTINUED | OUTPATIENT
Start: 2022-10-21 | End: 2022-10-21

## 2022-10-21 RX ORDER — CEFAZOLIN SODIUM/WATER 2 G/20 ML
2 SYRINGE (ML) INTRAVENOUS
Status: DISCONTINUED | OUTPATIENT
Start: 2022-10-21 | End: 2022-10-21 | Stop reason: HOSPADM

## 2022-10-21 RX ORDER — ONDANSETRON 2 MG/ML
4 INJECTION INTRAMUSCULAR; INTRAVENOUS EVERY 30 MIN PRN
Status: DISCONTINUED | OUTPATIENT
Start: 2022-10-21 | End: 2022-10-21 | Stop reason: HOSPADM

## 2022-10-21 RX ORDER — SODIUM CHLORIDE, SODIUM LACTATE, POTASSIUM CHLORIDE, CALCIUM CHLORIDE 600; 310; 30; 20 MG/100ML; MG/100ML; MG/100ML; MG/100ML
INJECTION, SOLUTION INTRAVENOUS CONTINUOUS PRN
Status: DISCONTINUED | OUTPATIENT
Start: 2022-10-21 | End: 2022-10-21

## 2022-10-21 RX ORDER — HYDROMORPHONE HCL IN WATER/PF 6 MG/30 ML
0.2 PATIENT CONTROLLED ANALGESIA SYRINGE INTRAVENOUS EVERY 5 MIN PRN
Status: DISCONTINUED | OUTPATIENT
Start: 2022-10-21 | End: 2022-10-21 | Stop reason: HOSPADM

## 2022-10-21 RX ORDER — SODIUM CHLORIDE, SODIUM LACTATE, POTASSIUM CHLORIDE, CALCIUM CHLORIDE 600; 310; 30; 20 MG/100ML; MG/100ML; MG/100ML; MG/100ML
INJECTION, SOLUTION INTRAVENOUS CONTINUOUS
Status: DISCONTINUED | OUTPATIENT
Start: 2022-10-21 | End: 2022-10-21 | Stop reason: HOSPADM

## 2022-10-21 RX ORDER — ONDANSETRON 4 MG/1
4 TABLET, ORALLY DISINTEGRATING ORAL EVERY 30 MIN PRN
Status: DISCONTINUED | OUTPATIENT
Start: 2022-10-21 | End: 2022-10-21 | Stop reason: HOSPADM

## 2022-10-21 RX ORDER — CEFAZOLIN SODIUM/WATER 2 G/20 ML
2 SYRINGE (ML) INTRAVENOUS SEE ADMIN INSTRUCTIONS
Status: DISCONTINUED | OUTPATIENT
Start: 2022-10-21 | End: 2022-10-21 | Stop reason: HOSPADM

## 2022-10-21 RX ORDER — AMOXICILLIN 250 MG
1-2 CAPSULE ORAL 2 TIMES DAILY PRN
Qty: 10 TABLET | Refills: 0 | Status: SHIPPED | OUTPATIENT
Start: 2022-10-21 | End: 2022-11-23

## 2022-10-21 RX ORDER — OXYCODONE HYDROCHLORIDE 5 MG/1
5 TABLET ORAL EVERY 4 HOURS PRN
Status: DISCONTINUED | OUTPATIENT
Start: 2022-10-21 | End: 2022-10-21 | Stop reason: HOSPADM

## 2022-10-21 RX ORDER — IBUPROFEN 600 MG/1
600 TABLET, FILM COATED ORAL EVERY 6 HOURS PRN
Qty: 30 TABLET | Refills: 0 | Status: SHIPPED | OUTPATIENT
Start: 2022-10-21 | End: 2022-11-15

## 2022-10-21 RX ADMIN — MIDAZOLAM 2 MG: 1 INJECTION INTRAMUSCULAR; INTRAVENOUS at 07:34

## 2022-10-21 RX ADMIN — LIDOCAINE HYDROCHLORIDE 100 MG: 20 INJECTION, SOLUTION INFILTRATION; PERINEURAL at 07:50

## 2022-10-21 RX ADMIN — PROPOFOL 200 MG: 10 INJECTION, EMULSION INTRAVENOUS at 07:50

## 2022-10-21 RX ADMIN — Medication 2 G: at 07:34

## 2022-10-21 RX ADMIN — FENTANYL CITRATE 100 MCG: 50 INJECTION, SOLUTION INTRAMUSCULAR; INTRAVENOUS at 07:50

## 2022-10-21 RX ADMIN — ONDANSETRON 4 MG: 2 INJECTION INTRAMUSCULAR; INTRAVENOUS at 08:05

## 2022-10-21 RX ADMIN — PHENYLEPHRINE HYDROCHLORIDE 50 MCG: 10 INJECTION INTRAVENOUS at 07:54

## 2022-10-21 RX ADMIN — PHENYLEPHRINE HYDROCHLORIDE 100 MCG: 10 INJECTION INTRAVENOUS at 07:57

## 2022-10-21 RX ADMIN — SODIUM CHLORIDE, POTASSIUM CHLORIDE, SODIUM LACTATE AND CALCIUM CHLORIDE: 600; 310; 30; 20 INJECTION, SOLUTION INTRAVENOUS at 07:50

## 2022-10-21 ASSESSMENT — ACTIVITIES OF DAILY LIVING (ADL)
ADLS_ACUITY_SCORE: 18
ADLS_ACUITY_SCORE: 18

## 2022-10-21 ASSESSMENT — LIFESTYLE VARIABLES: TOBACCO_USE: 0

## 2022-10-21 NOTE — TELEPHONE ENCOUNTER
Spoke with [facility] and they received the heart echo results.     Daniela Martínez RN  [facility]

## 2022-10-21 NOTE — DISCHARGE INSTRUCTIONS
Same Day Surgery Discharge Instructions for  Sedation and General Anesthesia     It's not unusual to feel dizzy, light-headed or faint for up to 24 hours after surgery or while taking pain medication.  If you have these symptoms: sit for a few minutes before standing and have someone assist you when you get up to walk or use the bathroom.    You should rest and relax for the next 24 hours. We recommend you make arrangements to have an adult stay with you for at least 24 hours after your discharge.  Avoid hazardous and strenuous activity.    DO NOT DRIVE any vehicle or operate mechanical equipment for 24 hours following the end of your surgery.  Even though you may feel normal, your reactions may be affected by the medication you have received.    Do not drink alcoholic beverages for 24 hours following surgery.     Slowly progress to your regular diet as you feel able. It's not unusual to feel nauseated and/or vomit after receiving anesthesia.  If you develop these symptoms, drink clear liquids (apple juice, ginger ale, broth, 7-up, etc. ) until you feel better.  If your nausea and vomiting persists for 24 hours, please notify your surgeon.      All narcotic pain medications, along with inactivity and anesthesia, can cause constipation. Drinking plenty of liquids and increasing fiber intake will help.    For any questions of a medical nature, call your surgeon.    Do not make important decisions for 24 hours.    If you had general anesthesia, you may have a sore throat for a couple of days related to the breathing tube used during surgery.  You may use Cepacol lozenges to help with this discomfort.  If it worsens or if you develop a fever, contact your surgeon.     If you feel your pain is not well managed with the pain medications prescribed by your surgeon, please contact your surgeon's office to let them know so they can address your concerns.      Cystoscopy Discharge Instructions      Diet:  Return to the diet  that you were on before the procedure, unless you are given specific diet instructions.  It is important to drink 6-8 glasses of fluids per day at home - at least 3-4 glasses should be water.    Activity:  Walk short distances and increase as your strength allows.  You may climb stairs.  Do not do strenuous exercise or heavy lifting until approved by surgeon.  Do not drive while taking narcotic pain medications.    Bathing:  You may take a shower.    Call your physician if these signs/symptoms are present:  Pain that is not relieved by a short rest or ordered pain medications.  Temperature at or above 101.0 F or chills.  Inability or difficulty urinating.  Excessive blood in urine.  Any questions or concerns.   **If you have questions or concerns about your procedure,   call Dr. Whittaker at [phone]**

## 2022-10-21 NOTE — ANESTHESIA PREPROCEDURE EVALUATION
Anesthesia Pre-Procedure Evaluation    Patient: Brian Flynn   MRN: [id number] : 1955        Procedure : Procedure(s):  CYSTOSCOPY RIGHT URETEROSCOPY HOLMIUM LASER LITHOTRIPSY RIGHT RETROGRADES RIGHT STENT PLACEMENT          Past Medical History:   Diagnosis Date     BENIGN HYPERTENSION 2007     BLIND Right Eye     since birth     Obesity, unspecified       Past Surgical History:   Procedure Laterality Date     CIRCUMCISION,CLAMP      for Severe phimosis with balanoposthitis     CYSTOSCOPY FLEXIBLE N/A 6/3/2022    Procedure: FLEXIBLE CYSTOURETHROSCOPY;  Surgeon: Alcon Whittaker MD;  Location: [facility] OR     PENECTOMY N/A 6/3/2022    Procedure: PARTIAL PENECTOMY;  Surgeon: Alcon Whittaker MD;  Location: [facility] OR      Allergies   Allergen Reactions     Fluticasone Propionate      Sped system up for one week prior to one use      Social History     Tobacco Use     Smoking status: Never     Smokeless tobacco: Never   Substance Use Topics     Alcohol use: No      Wt Readings from Last 1 Encounters:   10/21/22 117.4 kg (258 lb 12.8 oz)        Anesthesia Evaluation   Pt has had prior anesthetic.     No history of anesthetic complications       ROS/MED HX  ENT/Pulmonary:     (+) SAI risk factors, obese,  (-) tobacco use and sleep apnea   Neurologic:       Cardiovascular:     (+) hypertension-----    METS/Exercise Tolerance:     Hematologic:       Musculoskeletal:       GI/Hepatic:    (-) GERD   Renal/Genitourinary:     (+) renal disease, Nephrolithiasis ,     Endo:     (+) type II DM, Not using insulin, Obesity,     Psychiatric/Substance Use:       Infectious Disease:       Malignancy:       Other:            Physical Exam    Airway        Mallampati: II    Neck ROM: full     Respiratory Devices and Support         Dental       (+) caps      Cardiovascular   cardiovascular exam normal          Pulmonary   pulmonary exam normal                OUTSIDE LABS:  CBC:   Lab Results    Component Value Date    WBC 7.6 10/19/2022    WBC 6.4 08/19/2022    HGB 14.9 10/19/2022    HGB 14.0 08/19/2022    HCT 43.8 10/19/2022    HCT 42.2 08/19/2022     10/19/2022     08/19/2022     BMP:   Lab Results   Component Value Date     10/21/2022     10/19/2022    POTASSIUM 4.0 10/21/2022    POTASSIUM 5.3 10/19/2022    CHLORIDE 110 (H) 10/21/2022    CHLORIDE 107 10/19/2022    CO2 27 10/21/2022    CO2 30 10/19/2022    BUN 26 10/21/2022    BUN 31 (H) 10/19/2022    CR 1.19 10/21/2022    CR 1.22 10/19/2022     (H) 10/21/2022     (H) 10/19/2022     COAGS: No results found for: PTT, INR, FIBR  POC: No results found for: BGM, HCG, HCGS  HEPATIC:   Lab Results   Component Value Date    ALBUMIN 3.7 10/19/2022    PROTTOTAL 7.8 10/19/2022    ALT 30 10/19/2022    AST 22 10/19/2022    ALKPHOS 112 10/19/2022    BILITOTAL 1.5 (H) 10/19/2022     OTHER:   Lab Results   Component Value Date    A1C 5.6 08/19/2022    ADELINE 9.0 10/21/2022    TSH 2.34 03/14/2018    CRP 3.6 01/06/2009       Anesthesia Plan    ASA Status:  3   NPO Status:  NPO Appropriate    Anesthesia Type: General.     - Airway: LMA              Consents    Anesthesia Plan(s) and associated risks, benefits, and realistic alternatives discussed. Questions answered and patient/representative(s) expressed understanding.    - Discussed:     - Discussed with:  Patient         Postoperative Care    Pain management: IV analgesics.   PONV prophylaxis: Ondansetron (or other 5HT-3)     Comments:                Gerry Ortiz MD

## 2022-10-21 NOTE — BRIEF OP NOTE
[facility] Brief Operative Note    Pre-operative diagnosis: Right ureteral stone [N20.1]   Post-operative diagnosis * No post-op diagnosis entered *   Procedure: Procedure(s):  CYSTOSCOPY RIGHT URETEROSCOPY, RIGHT RETROGRADES, RIGHT STONE EXTRACTION   Surgeon: Alcon Whittaker MD   Assistants(s): None   Estimated blood loss: Minimal    Specimens: ID Type Source Tests Collected by Time Destination   A : Right ureteral stone Calculus/Stone Other STONE ANALYSIS Alcon Whittaker MD 10/21/2022  8:21 AM         Findings: 2-3mm stone noted in right distal ureter. Grasped with Halo basket and withdrawn per urethra. No stent placed due to minimal ureteral manipulation.    Plan home today.    Alcon Whittaker MD  Urology  [facility]

## 2022-10-21 NOTE — OP NOTE
DATE OF SERVICE: 10/21/2022  PREOPERATIVE DIAGNOSIS: Right nephrolithiasis  POSTOPERATIVE DIAGNOSIS: Right nephrolithiasis    PROCEDURES PERFORMED:   1. Cystourethroscopy  2. Right ureteroscopy with stone extraction  3. Right retrograde pyelography with interpretation of intraoperative fluoroscopic imaging    STAFF SURGEON: Alcon Whittaker MD, present and scrubbed for the entire case.   ANESTHESIA: General  ESTIMATED BLOOD LOSS: 1 cc  DRAINS/TUBES: None  COMPLICATIONS: None.   SPECIMEN: Stone for analysis  SIGNIFICANT FINDINGS: Small right distal ureteral stone grasped and removed intact.    BRIEF OPERATIVE INDICATIONS: Jeremi Flynn is a 67 year old male who recently presented with right ureteral calculus. After a discussion of all risks, benefits, and alternatives, the patient elected to proceed with definitive stone management. The patient understands the potential need for more than one procedure to eliminate all stone burden.      DESCRIPTION OF PROCEDURE:  After informed consent was verified, the patient was transported to the operating room, placed supine on the table. After adequate anesthesia was induced, he was placed in dorsal lithotomy and prepped and draped in the usual sterile fashion. A timeout was taken to confirm correct patient, procedure and laterality. Pre-operative IV antibiotics were administered.    A 22 Welsh rigid cystoscope was inserted per a well-lubricated urethra. The anterior urethra was unremarkable. The ureteral orifices were orthotopic bilaterally. The right ureteral orifice was identified and cannulated with a Sensor wire and 6-Fr open-ended catheter. The wire passed without resistance into the upper pole.  A retrograde pyelogram showed distal right ureteral stone, but no hydronephrosis, or other filling defects. A semirigid ureteroscope was advanced under direct vision alongside the safety wire to the level of the stone. A 2mm stone was noted in the right distal ureter. This was  grasped with a basket and withdrawn per urethra  Pullback ureteroscopy was performed and showed no retained stone fragments or ureteral injury. The bladder was drained.   The patient tolerated the procedure well.  No apparent complications. He was transported to the postanesthesia care unit in stable condition.      PLAN: Plan partial nephrectomy in 1 month.    Alcon Whittaker MD  Urology  [facility]

## 2022-10-21 NOTE — ANESTHESIA CARE TRANSFER NOTE
Patient: Brian Flynn    Procedure: Procedure(s):  CYSTOSCOPY RIGHT URETEROSCOPY, RIGHT RETROGRADES, RIGHT STONE EXTRACTION       Diagnosis: Right ureteral stone [N20.1]  Diagnosis Additional Information: No value filed.    Anesthesia Type:   General     Note:    Oropharynx: oropharynx clear of all foreign objects and oral gastic tube in place  Level of Consciousness: drowsy  Oxygen Supplementation: face mask  Level of Supplemental Oxygen (L/min / FiO2): 8  Independent Airway: airway patency satisfactory and stable  Dentition: dentition unchanged  Vital Signs Stable: post-procedure vital signs reviewed and stable  Report to RN Given: handoff report given  Patient transferred to: PACU  Comments: Pt exhibits spontaneous respirations, follows commands, suctioned, LMA removed, exchanging well, transferred to pacu with O2 @ 8L via mask, all monitors and alarms on, VSS, patent IV, report and transfer of care to RN.    Handoff Report: Identifed the Patient, Identified the Reponsible Provider, Reviewed the pertinent medical history, Discussed the surgical course, Reviewed Intra-OP anesthesia mangement and issues during anesthesia, Set expectations for post-procedure period and Allowed opportunity for questions and acknowledgement of understanding      Vitals:  Vitals Value Taken Time   /69 10/21/22 0837   Temp 36.3  C (97.3  F) 10/21/22 0837   Pulse 73 10/21/22 0844   Resp 11 10/21/22 0844   SpO2 99 % 10/21/22 0844   Vitals shown include unvalidated device data.    Electronically Signed By: KENN Sigala CRNA  October 21, 2022  8:45 AM

## 2022-10-21 NOTE — ANESTHESIA POSTPROCEDURE EVALUATION
Patient: Brian Flynn    Procedure: Procedure(s):  CYSTOSCOPY RIGHT URETEROSCOPY, RIGHT RETROGRADES, RIGHT STONE EXTRACTION       Anesthesia Type:  General    Note:  Disposition: Outpatient   Postop Pain Control: Uneventful            Sign Out: Well controlled pain   PONV: No   Neuro/Psych: Uneventful            Sign Out: Acceptable/Baseline neuro status   Airway/Respiratory: Uneventful            Sign Out: Acceptable/Baseline resp. status   CV/Hemodynamics: Uneventful            Sign Out: Acceptable CV status; No obvious hypovolemia; No obvious fluid overload   Other NRE: NONE   DID A NON-ROUTINE EVENT OCCUR? No           Last vitals:  Vitals Value Taken Time   /66 10/21/22 0915   Temp 36.4  C (97.6  F) 10/21/22 0915   Pulse 72 10/21/22 0923   Resp 16 10/21/22 0923   SpO2 95 % 10/21/22 0920   Vitals shown include unvalidated device data.    Electronically Signed By: Gerry Ortiz MD  October 21, 2022  2:05 PM

## 2022-10-23 ENCOUNTER — HEALTH MAINTENANCE LETTER (OUTPATIENT)
Age: 67
End: 2022-10-23

## 2022-10-25 LAB
APPEARANCE STONE: NORMAL
COMPN STONE: NORMAL
SPECIMEN WT: 7 MG

## 2022-11-15 ENCOUNTER — OFFICE VISIT (OUTPATIENT)
Dept: FAMILY MEDICINE | Facility: CLINIC | Age: 67
End: 2022-11-15
Payer: COMMERCIAL

## 2022-11-15 VITALS
SYSTOLIC BLOOD PRESSURE: 144 MMHG | OXYGEN SATURATION: 97 % | HEART RATE: 78 BPM | RESPIRATION RATE: 13 BRPM | DIASTOLIC BLOOD PRESSURE: 72 MMHG | TEMPERATURE: 98.5 F | BODY MASS INDEX: 38.69 KG/M2 | WEIGHT: 262 LBS

## 2022-11-15 DIAGNOSIS — N28.89 LEFT RENAL MASS: ICD-10-CM

## 2022-11-15 DIAGNOSIS — E66.01 MORBID OBESITY (H): ICD-10-CM

## 2022-11-15 DIAGNOSIS — Z01.818 PREOPERATIVE CLEARANCE: Primary | ICD-10-CM

## 2022-11-15 DIAGNOSIS — I10 ESSENTIAL HYPERTENSION, BENIGN: ICD-10-CM

## 2022-11-15 DIAGNOSIS — E11.69 TYPE 2 DIABETES MELLITUS WITH OTHER SPECIFIED COMPLICATION, WITHOUT LONG-TERM CURRENT USE OF INSULIN (H): ICD-10-CM

## 2022-11-15 LAB
ERYTHROCYTE [DISTWIDTH] IN BLOOD BY AUTOMATED COUNT: 13 % (ref 10–15)
HBA1C MFR BLD: 6 % (ref 0–5.6)
HCT VFR BLD AUTO: 41 % (ref 40–53)
HGB BLD-MCNC: 13.8 G/DL (ref 13.3–17.7)
MCH RBC QN AUTO: 29.6 PG (ref 26.5–33)
MCHC RBC AUTO-ENTMCNC: 33.7 G/DL (ref 31.5–36.5)
MCV RBC AUTO: 88 FL (ref 78–100)
PLATELET # BLD AUTO: 176 10E3/UL (ref 150–450)
RBC # BLD AUTO: 4.66 10E6/UL (ref 4.4–5.9)
WBC # BLD AUTO: 8.3 10E3/UL (ref 4–11)

## 2022-11-15 PROCEDURE — 85027 COMPLETE CBC AUTOMATED: CPT | Performed by: FAMILY MEDICINE

## 2022-11-15 PROCEDURE — 36415 COLL VENOUS BLD VENIPUNCTURE: CPT | Performed by: FAMILY MEDICINE

## 2022-11-15 PROCEDURE — 83036 HEMOGLOBIN GLYCOSYLATED A1C: CPT | Performed by: FAMILY MEDICINE

## 2022-11-15 PROCEDURE — 99214 OFFICE O/P EST MOD 30 MIN: CPT | Performed by: FAMILY MEDICINE

## 2022-11-15 ASSESSMENT — PAIN SCALES - GENERAL: PAINLEVEL: MODERATE PAIN (5)

## 2022-11-15 NOTE — H&P (VIEW-ONLY)
[address] [address]  Phone: [phone]  Primary Provider: Pat Beal  Pre-op Performing Provider: PAT BEAL      PREOPERATIVE EVALUATION:  Today's date: 11/15/2022    Brian Flynn is a 67 year old male who presents for a preoperative evaluation.    Surgical Information:  Surgery/Procedure: left partial nephrectomy  Surgery Location: [facility]  Surgeon: Panfilo  Surgery Date: 11/25/2022  Time of Surgery: 8:30am  Where patient plans to recover: At home with family  Fax number for surgical facility: Note does not need to be faxed, will be available electronically in Epic.    Type of Anesthesia Anticipated: General    Assessment & Plan     The proposed surgical procedure is considered LOW risk.    Preoperative clearance    - HEMOGLOBIN A1C; Future  - CBC with platelets; Future    Essential hypertension, benign  BP is stable on the upper side, on 3 BP medication, he can continue it    Type 2 diabetes mellitus with other specified complication, without long-term current use of insulin (H)  Stable in the past, hold metformin on the day of surgery.  - HEMOGLOBIN A1C; Future    Morbid obesity (H)               Risks and Recommendations:  The patient has the following additional risks and recommendations for perioperative complications:   - No identified additional risk factors other than previously addressed    Medication Instructions:  Patient is to take all scheduled medications on the day of surgery except metformin night beofre if he is fasting.    RECOMMENDATION:  APPROVAL GIVEN to proceed with proposed procedure, without further diagnostic evaluation.      6}    Subjective     HPI related to upcoming procedure:     Preop Questions 11/12/2022   1. Have you ever had a heart attack or stroke? No   2. Have you ever had surgery on your heart or blood vessels, such as a stent placement, a coronary artery bypass, or surgery on an artery in your head,  neck, heart, or legs? No   3. Do you have chest pain with activity? No   4. Do you have a history of  heart failure? No   5. Do you currently have a cold, bronchitis or symptoms of other infection? No   6. Do you have a cough, shortness of breath, or wheezing? No   7. Do you or anyone in your family have previous history of blood clots? No   8. Do you or does anyone in your family have a serious bleeding problem such as prolonged bleeding following surgeries or cuts? No   9. Have you ever had problems with anemia or been told to take iron pills? No   10. Have you had any abnormal blood loss such as black, tarry or bloody stools? No   11. Have you ever had a blood transfusion? YES -    11a. Have you ever had a transfusion reaction? No   12. Are you willing to have a blood transfusion if it is medically needed before, during, or after your surgery? Yes   13. Have you or any of your relatives ever had problems with anesthesia? No   14. Do you have sleep apnea, excessive snoring or daytime drowsiness? No   15. Do you have any artifical heart valves or other implanted medical devices like a pacemaker, defibrillator, or continuous glucose monitor? No   16. Do you have artificial joints? No   17. Are you allergic to latex? No       Health Care Directive:  Patient has a Health Care Directive on file      Preoperative Review of :   reviewed - controlled substances reflected in medication list.        Status of Chronic Conditions:  See problem list for active medical problems.  Problems all longstanding and stable, except as noted/documented.  See ROS for pertinent symptoms related to these conditions.      Review of Systems  CONSTITUTIONAL: NEGATIVE for fever, chills, change in weight  INTEGUMENTARY/SKIN: NEGATIVE for worrisome rashes, moles or lesions  EYES: NEGATIVE for vision changes or irritation  ENT/MOUTH: NEGATIVE for ear, mouth and throat problems  RESP: NEGATIVE for significant cough or SOB  CV: NEGATIVE for  chest pain, palpitations or peripheral edema  GI: NEGATIVE for nausea, abdominal pain, heartburn, or change in bowel habits  : NEGATIVE for frequency, dysuria, or hematuria  MUSCULOSKELETAL: NEGATIVE for significant arthralgias or myalgia  NEURO: NEGATIVE for weakness, dizziness or paresthesias  ENDOCRINE: NEGATIVE for temperature intolerance, skin/hair changes  HEME: NEGATIVE for bleeding problems  PSYCHIATRIC: NEGATIVE for changes in mood or affect    Patient Active Problem List    Diagnosis Date Noted     Left renal mass 09/22/2022     Priority: Medium     Right ureteral stone 09/22/2022     Priority: Medium     Penile cancer (H) 06/29/2022     Priority: Medium     Acquired renal cyst of left kidney 06/29/2022     Priority: Medium     Nephrolithiasis 06/29/2022     Priority: Medium     Penile mass 05/30/2022     Priority: Medium     Elevated prostate specific antigen (PSA) 05/30/2022     Priority: Medium     Hyperlipidemia LDL goal <100 05/21/2021     Priority: Medium     Diabetes mellitus, type 2 (H) 08/10/2020     Priority: Medium     Morbid obesity (H) 07/12/2018     Priority: Medium     CARDIOVASCULAR SCREENING; LDL GOAL LESS THAN 130 10/31/2010     Priority: Medium     Essential hypertension, benign 07/13/2007     Priority: Medium     Redundant prepuce and phimosis 07/13/2007     Priority: Medium      Past Medical History:   Diagnosis Date     BENIGN HYPERTENSION 7/13/2007     BLIND Right Eye     since birth     Obesity, unspecified      Past Surgical History:   Procedure Laterality Date     CIRCUMCISION,CLAMP  11/07    for Severe phimosis with balanoposthitis     CYSTOSCOPY FLEXIBLE N/A 6/3/2022    Procedure: FLEXIBLE CYSTOURETHROSCOPY;  Surgeon: Alcon Whittaker MD;  Location:  OR     LASER HOLMIUM LITHOTRIPSY URETER(S), INSERT STENT, COMBINED Right 10/21/2022    Procedure: CYSTOSCOPY RIGHT URETEROSCOPY, RIGHT RETROGRADES, RIGHT STONE EXTRACTION;  Surgeon: Alcon Whittaker MD;   Location:  OR     PENECTOMY N/A 6/3/2022    Procedure: PARTIAL PENECTOMY;  Surgeon: Alcon Whittaker MD;  Location:  OR     Current Outpatient Medications   Medication Sig Dispense Refill     amLODIPine (NORVASC) 5 MG tablet Take 1 tablet (5 mg) by mouth daily 90 tablet 1     atorvastatin (LIPITOR) 20 MG tablet Take 1 tablet (20 mg) by mouth daily 90 tablet 1     ibuprofen (ADVIL/MOTRIN) 600 MG tablet Take 1 tablet (600 mg) by mouth every 6 hours as needed for other (mild and/or inflammatory pain) 30 tablet 0     lisinopril-hydrochlorothiazide (ZESTORETIC) 20-25 MG tablet Take 1 tablet by mouth daily 90 tablet 1     metFORMIN (GLUCOPHAGE) 500 MG tablet Take 1 tab twice daily. 180 tablet 1     metoprolol succinate ER (TOPROL XL) 50 MG 24 hr tablet Take 1 tablet (50 mg) by mouth daily 90 tablet 1     senna-docusate (SENOKOT-S/PERICOLACE) 8.6-50 MG tablet Take 1-2 tablets by mouth 2 times daily as needed for constipation (While on oral opioids.) 10 tablet 0     VITAMIN D PO        zinc gluconate 50 MG tablet Take 50 mg by mouth daily       acetaminophen (TYLENOL) 325 MG tablet Take 2 tablets (650 mg) by mouth every 4 hours as needed for mild pain (Patient not taking: No sig reported) 90 tablet 0     ASPIRIN NOT PRESCRIBED (INTENTIONAL) Please choose reason not prescribed from choices below. (Patient not taking: No sig reported)       blood glucose (ACCU-CHEK SOLO PLUS) test strip Use to test blood sugar 1 times daily or as directed.  Ok to substitute alternative if insurance prefers. 100 strip prn     blood glucose monitoring (ACCU-CHEK SOLO PLUS) meter device kit Use to test blood sugar 1 times daily or as directed.  Ok to substitute alternative if insurance prefers. 1 kit 0     blood glucose monitoring (SOFTCLIX) lancets Use to test blood sugar 1 times daily or as directed.  Ok to substitute alternative if insurance prefers. 100 each 1     oxyCODONE (ROXICODONE) 5 MG tablet Take 1-2 tablets (5-10  mg) by mouth every 4 hours as needed for moderate to severe pain 15 tablet 0     senna-docusate (SENOKOT-S/PERICOLACE) 8.6-50 MG tablet Take 1-2 tablets by mouth 2 times daily (Patient not taking: No sig reported) 30 tablet 0       Allergies   Allergen Reactions     Fluticasone Propionate      Sped system up for one week prior to one use        Social History     Tobacco Use     Smoking status: Never     Smokeless tobacco: Never   Substance Use Topics     Alcohol use: No     Family History   Problem Relation Age of Onset     Cancer Mother         ovarian     Hypertension Father      Cerebrovascular Disease Father         age 69     Asthma Father      Diabetes No family hx of      Breast Cancer No family hx of      Cancer - colorectal No family hx of      Prostate Cancer No family hx of      History   Drug Use No         Objective     BP (!) 156/70   Pulse 78   Temp 98.5  F (36.9  C) (Tympanic)   Resp 13   Wt 118.8 kg (262 lb)   SpO2 97%   BMI 38.69 kg/m      Physical Exam    GENERAL APPEARANCE: healthy, alert and no distress     EYES: EOMI,  PERRL     HENT: ear canals and TM's normal and nose and mouth without ulcers or lesions     NECK: no adenopathy, no asymmetry, masses, or scars and thyroid normal to palpation     RESP: lungs clear to auscultation - no rales, rhonchi or wheezes     CV: regular rates and rhythm, normal S1 S2, no S3 or S4 and no murmur, click or rub     ABDOMEN:  soft, nontender, no HSM or masses and bowel sounds normal     MS: extremities normal- no gross deformities noted, no evidence of inflammation in joints, FROM in all extremities.     SKIN: no suspicious lesions or rashes     NEURO: Normal strength and tone, sensory exam grossly normal, mentation intact and speech normal     PSYCH: mentation appears normal. and affect normal/bright     LYMPHATICS: No cervical adenopathy    Recent Labs   Lab Test 10/21/22  0609 10/19/22  1002 09/19/22  0846 08/19/22  0904 06/03/22  1116 06/01/22  0935    HGB  --  14.9  --  14.0  --  14.5   PLT  --  178  --  191  --   --     141  --  139  --  141   POTASSIUM 4.0 5.3  --  5.0   < > 4.8   CR 1.19 1.22   < > 1.44*   < > 1.21   A1C  --   --   --  5.6  --  5.8*    < > = values in this interval not displayed.        Diagnostics:  Labs pending at this time.  Results will be reviewed when available.   Echo done recently.    Revised Cardiac Risk Index (RCRI):  The patient has the following serious cardiovascular risks for perioperative complications:   - No serious cardiac risks = 0 points     RCRI Interpretation: 0 points: Class I (very low risk - 0.4% complication rate)           Signed Electronically by: Kirill Beal MD  Copy of this evaluation report is provided to requesting physician.

## 2022-11-15 NOTE — PROGRESS NOTES
[address] [address]  Phone: [phone]  Primary Provider: Pat Beal  Pre-op Performing Provider: PAT BEAL      PREOPERATIVE EVALUATION:  Today's date: 11/15/2022    Brian Flynn is a 67 year old male who presents for a preoperative evaluation.    Surgical Information:  Surgery/Procedure: left partial nephrectomy  Surgery Location: [facility]  Surgeon: Panfilo  Surgery Date: 11/25/2022  Time of Surgery: 8:30am  Where patient plans to recover: At home with family  Fax number for surgical facility: Note does not need to be faxed, will be available electronically in Epic.    Type of Anesthesia Anticipated: General    Assessment & Plan     The proposed surgical procedure is considered LOW risk.    Preoperative clearance    - HEMOGLOBIN A1C; Future  - CBC with platelets; Future    Essential hypertension, benign  BP is stable on the upper side, on 3 BP medication, he can continue it    Type 2 diabetes mellitus with other specified complication, without long-term current use of insulin (H)  Stable in the past, hold metformin on the day of surgery.  - HEMOGLOBIN A1C; Future    Morbid obesity (H)    Left renal mass.           Risks and Recommendations:  The patient has the following additional risks and recommendations for perioperative complications:   - No identified additional risk factors other than previously addressed    Medication Instructions:  Patient is to take all scheduled medications on the day of surgery except metformin night beofre if he is fasting.    RECOMMENDATION:  APPROVAL GIVEN to proceed with proposed procedure, without further diagnostic evaluation.      6}    Subjective     HPI related to upcoming procedure:     Preop Questions 11/12/2022   1. Have you ever had a heart attack or stroke? No   2. Have you ever had surgery on your heart or blood vessels, such as a stent placement, a coronary artery bypass, or surgery on an  artery in your head, neck, heart, or legs? No   3. Do you have chest pain with activity? No   4. Do you have a history of  heart failure? No   5. Do you currently have a cold, bronchitis or symptoms of other infection? No   6. Do you have a cough, shortness of breath, or wheezing? No   7. Do you or anyone in your family have previous history of blood clots? No   8. Do you or does anyone in your family have a serious bleeding problem such as prolonged bleeding following surgeries or cuts? No   9. Have you ever had problems with anemia or been told to take iron pills? No   10. Have you had any abnormal blood loss such as black, tarry or bloody stools? No   11. Have you ever had a blood transfusion? YES -    11a. Have you ever had a transfusion reaction? No   12. Are you willing to have a blood transfusion if it is medically needed before, during, or after your surgery? Yes   13. Have you or any of your relatives ever had problems with anesthesia? No   14. Do you have sleep apnea, excessive snoring or daytime drowsiness? No   15. Do you have any artifical heart valves or other implanted medical devices like a pacemaker, defibrillator, or continuous glucose monitor? No   16. Do you have artificial joints? No   17. Are you allergic to latex? No       Health Care Directive:  Patient has a Health Care Directive on file      Preoperative Review of :   reviewed - controlled substances reflected in medication list.        Status of Chronic Conditions:  See problem list for active medical problems.  Problems all longstanding and stable, except as noted/documented.  See ROS for pertinent symptoms related to these conditions.      Review of Systems  CONSTITUTIONAL: NEGATIVE for fever, chills, change in weight  INTEGUMENTARY/SKIN: NEGATIVE for worrisome rashes, moles or lesions  EYES: NEGATIVE for vision changes or irritation  ENT/MOUTH: NEGATIVE for ear, mouth and throat problems  RESP: NEGATIVE for significant cough or  SOB  CV: NEGATIVE for chest pain, palpitations or peripheral edema  GI: NEGATIVE for nausea, abdominal pain, heartburn, or change in bowel habits  : NEGATIVE for frequency, dysuria, or hematuria  MUSCULOSKELETAL: NEGATIVE for significant arthralgias or myalgia  NEURO: NEGATIVE for weakness, dizziness or paresthesias  ENDOCRINE: NEGATIVE for temperature intolerance, skin/hair changes  HEME: NEGATIVE for bleeding problems  PSYCHIATRIC: NEGATIVE for changes in mood or affect    Patient Active Problem List    Diagnosis Date Noted     Left renal mass 09/22/2022     Priority: Medium     Right ureteral stone 09/22/2022     Priority: Medium     Penile cancer (H) 06/29/2022     Priority: Medium     Acquired renal cyst of left kidney 06/29/2022     Priority: Medium     Nephrolithiasis 06/29/2022     Priority: Medium     Penile mass 05/30/2022     Priority: Medium     Elevated prostate specific antigen (PSA) 05/30/2022     Priority: Medium     Hyperlipidemia LDL goal <100 05/21/2021     Priority: Medium     Diabetes mellitus, type 2 (H) 08/10/2020     Priority: Medium     Morbid obesity (H) 07/12/2018     Priority: Medium     CARDIOVASCULAR SCREENING; LDL GOAL LESS THAN 130 10/31/2010     Priority: Medium     Essential hypertension, benign 07/13/2007     Priority: Medium     Redundant prepuce and phimosis 07/13/2007     Priority: Medium      Past Medical History:   Diagnosis Date     BENIGN HYPERTENSION 7/13/2007     BLIND Right Eye     since birth     Obesity, unspecified      Past Surgical History:   Procedure Laterality Date     CIRCUMCISION,CLAMP  11/07    for Severe phimosis with balanoposthitis     CYSTOSCOPY FLEXIBLE N/A 6/3/2022    Procedure: FLEXIBLE CYSTOURETHROSCOPY;  Surgeon: Alcon Whittaker MD;  Location: [facility] OR     LASER HOLMIUM LITHOTRIPSY URETER(S), INSERT STENT, COMBINED Right 10/21/2022    Procedure: CYSTOSCOPY RIGHT URETEROSCOPY, RIGHT RETROGRADES, RIGHT STONE EXTRACTION;  Surgeon: Panfilo  Alcon Garibay MD;  Location:  OR     PENECTOMY N/A 6/3/2022    Procedure: PARTIAL PENECTOMY;  Surgeon: Alcon Whittaker MD;  Location:  OR     Current Outpatient Medications   Medication Sig Dispense Refill     amLODIPine (NORVASC) 5 MG tablet Take 1 tablet (5 mg) by mouth daily 90 tablet 1     atorvastatin (LIPITOR) 20 MG tablet Take 1 tablet (20 mg) by mouth daily 90 tablet 1     ibuprofen (ADVIL/MOTRIN) 600 MG tablet Take 1 tablet (600 mg) by mouth every 6 hours as needed for other (mild and/or inflammatory pain) 30 tablet 0     lisinopril-hydrochlorothiazide (ZESTORETIC) 20-25 MG tablet Take 1 tablet by mouth daily 90 tablet 1     metFORMIN (GLUCOPHAGE) 500 MG tablet Take 1 tab twice daily. 180 tablet 1     metoprolol succinate ER (TOPROL XL) 50 MG 24 hr tablet Take 1 tablet (50 mg) by mouth daily 90 tablet 1     senna-docusate (SENOKOT-S/PERICOLACE) 8.6-50 MG tablet Take 1-2 tablets by mouth 2 times daily as needed for constipation (While on oral opioids.) 10 tablet 0     VITAMIN D PO        zinc gluconate 50 MG tablet Take 50 mg by mouth daily       acetaminophen (TYLENOL) 325 MG tablet Take 2 tablets (650 mg) by mouth every 4 hours as needed for mild pain (Patient not taking: No sig reported) 90 tablet 0     ASPIRIN NOT PRESCRIBED (INTENTIONAL) Please choose reason not prescribed from choices below. (Patient not taking: No sig reported)       blood glucose (ACCU-CHEK SOLO PLUS) test strip Use to test blood sugar 1 times daily or as directed.  Ok to substitute alternative if insurance prefers. 100 strip prn     blood glucose monitoring (ACCU-CHEK SOLO PLUS) meter device kit Use to test blood sugar 1 times daily or as directed.  Ok to substitute alternative if insurance prefers. 1 kit 0     blood glucose monitoring (SOFTCLIX) lancets Use to test blood sugar 1 times daily or as directed.  Ok to substitute alternative if insurance prefers. 100 each 1     oxyCODONE (ROXICODONE) 5 MG  tablet Take 1-2 tablets (5-10 mg) by mouth every 4 hours as needed for moderate to severe pain 15 tablet 0     senna-docusate (SENOKOT-S/PERICOLACE) 8.6-50 MG tablet Take 1-2 tablets by mouth 2 times daily (Patient not taking: No sig reported) 30 tablet 0       Allergies   Allergen Reactions     Fluticasone Propionate      Sped system up for one week prior to one use        Social History     Tobacco Use     Smoking status: Never     Smokeless tobacco: Never   Substance Use Topics     Alcohol use: No     Family History   Problem Relation Age of Onset     Cancer Mother         ovarian     Hypertension Father      Cerebrovascular Disease Father         age 69     Asthma Father      Diabetes No family hx of      Breast Cancer No family hx of      Cancer - colorectal No family hx of      Prostate Cancer No family hx of      History   Drug Use No         Objective     BP (!) 156/70   Pulse 78   Temp 98.5  F (36.9  C) (Tympanic)   Resp 13   Wt 118.8 kg (262 lb)   SpO2 97%   BMI 38.69 kg/m      Physical Exam    GENERAL APPEARANCE: healthy, alert and no distress     EYES: EOMI,  PERRL     HENT: ear canals and TM's normal and nose and mouth without ulcers or lesions     NECK: no adenopathy, no asymmetry, masses, or scars and thyroid normal to palpation     RESP: lungs clear to auscultation - no rales, rhonchi or wheezes     CV: regular rates and rhythm, normal S1 S2, no S3 or S4 and no murmur, click or rub     ABDOMEN:  soft, nontender, no HSM or masses and bowel sounds normal     MS: extremities normal- no gross deformities noted, no evidence of inflammation in joints, FROM in all extremities.     SKIN: no suspicious lesions or rashes     NEURO: Normal strength and tone, sensory exam grossly normal, mentation intact and speech normal     PSYCH: mentation appears normal. and affect normal/bright     LYMPHATICS: No cervical adenopathy    Recent Labs   Lab Test 10/21/22  0609 10/19/22  1002 09/19/22  0846 08/19/22  0904  06/03/22  1116 06/01/22  0935   HGB  --  14.9  --  14.0  --  14.5   PLT  --  178  --  191  --   --     141  --  139  --  141   POTASSIUM 4.0 5.3  --  5.0   < > 4.8   CR 1.19 1.22   < > 1.44*   < > 1.21   A1C  --   --   --  5.6  --  5.8*    < > = values in this interval not displayed.        Diagnostics:  Labs pending at this time.  Results will be reviewed when available.   Echo done recently.    Revised Cardiac Risk Index (RCRI):  The patient has the following serious cardiovascular risks for perioperative complications:   - No serious cardiac risks = 0 points     RCRI Interpretation: 0 points: Class I (very low risk - 0.4% complication rate)           Signed Electronically by: Kirill Beal MD  Copy of this evaluation report is provided to requesting physician.

## 2022-11-22 ENCOUNTER — LAB (OUTPATIENT)
Dept: URGENT CARE | Facility: URGENT CARE | Age: 67
End: 2022-11-22
Payer: COMMERCIAL

## 2022-11-22 DIAGNOSIS — Z20.822 ENCOUNTER FOR LABORATORY TESTING FOR COVID-19 VIRUS: ICD-10-CM

## 2022-11-22 LAB — SARS-COV-2 RNA RESP QL NAA+PROBE: NEGATIVE

## 2022-11-22 PROCEDURE — U0003 INFECTIOUS AGENT DETECTION BY NUCLEIC ACID (DNA OR RNA); SEVERE ACUTE RESPIRATORY SYNDROME CORONAVIRUS 2 (SARS-COV-2) (CORONAVIRUS DISEASE [COVID-19]), AMPLIFIED PROBE TECHNIQUE, MAKING USE OF HIGH THROUGHPUT TECHNOLOGIES AS DESCRIBED BY CMS-2020-01-R: HCPCS

## 2022-11-22 PROCEDURE — U0005 INFEC AGEN DETEC AMPLI PROBE: HCPCS

## 2022-11-23 RX ORDER — OXYCODONE HYDROCHLORIDE 5 MG/1
5 CAPSULE ORAL EVERY 4 HOURS PRN
Status: ON HOLD | COMMUNITY
End: 2022-11-26

## 2022-11-23 RX ORDER — IBUPROFEN 600 MG/1
600 TABLET, FILM COATED ORAL EVERY 6 HOURS PRN
COMMUNITY
End: 2024-02-13

## 2022-11-23 NOTE — PROGRESS NOTES
PTA medications updated by Medication Scribe prior to surgery via phone call with patient (last doses completed by Nurse)     Medication history sources: Patient, Surescripts and H&P  In the past week, patient estimated taking medication this percent of the time: Greater than 90%  Adherence assessment: N/A Not Observed    Significant changes made to the medication list:  Patient reports no longer taking the following meds (med scribe removed from PTA med list): Senna-Docusate      Additional medication history information:   None    Medication reconciliation completed by provider prior to medication history? No    Time spent in this activity: 30 minutes    The information provided in this note is only as accurate as the sources available at the time of update(s)    Prior to Admission medications    Medication Sig Last Dose Taking? Auth Provider Long Term End Date   amLODIPine (NORVASC) 5 MG tablet Take 1 tablet (5 mg) by mouth daily  at pm Yes Kirill Beal MD Yes    atorvastatin (LIPITOR) 20 MG tablet Take 1 tablet (20 mg) by mouth daily  at pm Yes Kirill Beal MD Yes    ibuprofen (ADVIL/MOTRIN) 600 MG tablet Take 600 mg by mouth every 6 hours as needed for moderate pain (4-6)  at prn Yes Reported, Patient     lisinopril-hydrochlorothiazide (ZESTORETIC) 20-25 MG tablet Take 1 tablet by mouth daily  at pm Yes Kirill Bela MD Yes    metFORMIN (GLUCOPHAGE) 500 MG tablet Take 1 tab twice daily.  at pm Yes Kirill Beal MD Yes    metoprolol succinate ER (TOPROL XL) 50 MG 24 hr tablet Take 1 tablet (50 mg) by mouth daily  at pm Yes Kirill Beal MD Yes    oxyCODONE (OXY-IR) 5 MG capsule Take 5 mg by mouth every 4 hours as needed for severe pain (7-10)  at prn Yes Reported, Patient     VITAMIN D PO Take 1 capsule by mouth daily 11/23/2022 at am Yes Reported, Patient     zinc gluconate 50 MG tablet Take 50 mg by mouth daily 11/23/2022 at am Yes Reported, Patient     ASPIRIN NOT PRESCRIBED (INTENTIONAL) Please choose reason  not prescribed from choices below.  Patient not taking: No sig reported   Kirill Beal MD Yes    blood glucose (ACCU-CHEK SOLO PLUS) test strip Use to test blood sugar 1 times daily or as directed.  Ok to substitute alternative if insurance prefers.   Kirill Beal MD     blood glucose monitoring (ACCU-CHEK SOLO PLUS) meter device kit Use to test blood sugar 1 times daily or as directed.  Ok to substitute alternative if insurance prefers.   Kirill Beal MD     blood glucose monitoring (SOFTCLIX) lancets Use to test blood sugar 1 times daily or as directed.  Ok to substitute alternative if insurance prefers.   Kirill Beal MD       Medication history completed by:    Regis Mcmahon CPhT  Medication [facility]

## 2022-11-24 ENCOUNTER — ANESTHESIA EVENT (OUTPATIENT)
Dept: SURGERY | Facility: CLINIC | Age: 67
End: 2022-11-24
Payer: COMMERCIAL

## 2022-11-24 ASSESSMENT — LIFESTYLE VARIABLES: TOBACCO_USE: 0

## 2022-11-24 NOTE — ANESTHESIA PREPROCEDURE EVALUATION
Anesthesia Pre-Procedure Evaluation    Patient: Brian Flynn   MRN: [id number] : 1955        Procedure : Procedure(s):  ROBOTIC ASSISTED LEFT PARTIAL NEPHRECTOMY          Past Medical History:   Diagnosis Date     BENIGN HYPERTENSION 2007     BLIND Right Eye     since birth     Obesity, unspecified       Past Surgical History:   Procedure Laterality Date     CIRCUMCISION,CLAMP      for Severe phimosis with balanoposthitis     CYSTOSCOPY FLEXIBLE N/A 6/3/2022    Procedure: FLEXIBLE CYSTOURETHROSCOPY;  Surgeon: Alcon Whittaker MD;  Location:  OR     LASER HOLMIUM LITHOTRIPSY URETER(S), INSERT STENT, COMBINED Right 10/21/2022    Procedure: CYSTOSCOPY RIGHT URETEROSCOPY, RIGHT RETROGRADES, RIGHT STONE EXTRACTION;  Surgeon: Alcon Whittaker MD;  Location:  OR     PENECTOMY N/A 6/3/2022    Procedure: PARTIAL PENECTOMY;  Surgeon: Alcon Whittaker MD;  Location:  OR      Allergies   Allergen Reactions     Fluticasone Propionate      Sped system up for one week prior to one use      Social History     Tobacco Use     Smoking status: Never     Smokeless tobacco: Never   Substance Use Topics     Alcohol use: No      Wt Readings from Last 1 Encounters:   11/15/22 118.8 kg (262 lb)        Anesthesia Evaluation   Pt has had prior anesthetic.     No history of anesthetic complications       ROS/MED HX  ENT/Pulmonary:     (+) SAI risk factors, obese,  (-) tobacco use and sleep apnea   Neurologic:       Cardiovascular:     (+) hypertension-----    METS/Exercise Tolerance:     Hematologic:       Musculoskeletal:       GI/Hepatic:    (-) GERD   Renal/Genitourinary: Comment: LEft renal mass    (+) renal disease, Nephrolithiasis ,     Endo:     (+) type II DM, Not using insulin, Obesity,     Psychiatric/Substance Use:       Infectious Disease:       Malignancy:       Other:            Physical Exam    Airway  airway exam normal      Mallampati: II   TM distance: > 3 FB    Neck ROM: full   Mouth opening: > 3 cm    Respiratory Devices and Support         Dental  no notable dental history         Cardiovascular   cardiovascular exam normal          Pulmonary   pulmonary exam normal                OUTSIDE LABS:  CBC:   Lab Results   Component Value Date    WBC 8.3 11/15/2022    WBC 7.6 10/19/2022    HGB 13.8 11/15/2022    HGB 14.9 10/19/2022    HCT 41.0 11/15/2022    HCT 43.8 10/19/2022     11/15/2022     10/19/2022     BMP:   Lab Results   Component Value Date     10/21/2022     10/19/2022    POTASSIUM 4.0 10/21/2022    POTASSIUM 5.3 10/19/2022    CHLORIDE 110 (H) 10/21/2022    CHLORIDE 107 10/19/2022    CO2 27 10/21/2022    CO2 30 10/19/2022    BUN 26 10/21/2022    BUN 31 (H) 10/19/2022    CR 1.19 10/21/2022    CR 1.22 10/19/2022     (H) 10/21/2022     (H) 10/19/2022     COAGS: No results found for: PTT, INR, FIBR  POC: No results found for: BGM, HCG, HCGS  HEPATIC:   Lab Results   Component Value Date    ALBUMIN 3.7 10/19/2022    PROTTOTAL 7.8 10/19/2022    ALT 30 10/19/2022    AST 22 10/19/2022    ALKPHOS 112 10/19/2022    BILITOTAL 1.5 (H) 10/19/2022     OTHER:   Lab Results   Component Value Date    A1C 6.0 (H) 11/15/2022    ADELINE 9.0 10/21/2022    TSH 2.34 03/14/2018    CRP 3.6 01/06/2009       Anesthesia Plan    ASA Status:  3   NPO Status:  NPO Appropriate    Anesthesia Type: General.     - Airway: ETT   Induction: Intravenous.   Maintenance: Balanced.        Consents    Anesthesia Plan(s) and associated risks, benefits, and realistic alternatives discussed. Questions answered and patient/representative(s) expressed understanding.    - Discussed:     - Discussed with:  Patient         Postoperative Care    Pain management: IV analgesics.   PONV prophylaxis: Ondansetron (or other 5HT-3), Dexamethasone or Solumedrol     Comments:                Toñito Weeks DO, DO

## 2022-11-25 ENCOUNTER — HOSPITAL ENCOUNTER (OUTPATIENT)
Facility: CLINIC | Age: 67
Discharge: HOME | End: 2022-11-26
Attending: UROLOGY | Admitting: UROLOGY
Payer: COMMERCIAL

## 2022-11-25 ENCOUNTER — ANESTHESIA (OUTPATIENT)
Dept: SURGERY | Facility: CLINIC | Age: 67
End: 2022-11-25
Payer: COMMERCIAL

## 2022-11-25 DIAGNOSIS — N28.89 LEFT RENAL MASS: Primary | ICD-10-CM

## 2022-11-25 LAB
ABO/RH(D): NORMAL
ANION GAP SERPL CALCULATED.3IONS-SCNC: 4 MMOL/L (ref 3–14)
ANTIBODY SCREEN: NEGATIVE
BUN SERPL-MCNC: 33 MG/DL (ref 7–30)
CALCIUM SERPL-MCNC: 8.6 MG/DL (ref 8.5–10.1)
CHLORIDE BLD-SCNC: 109 MMOL/L (ref 94–109)
CO2 SERPL-SCNC: 26 MMOL/L (ref 20–32)
CREAT SERPL-MCNC: 1.29 MG/DL (ref 0.66–1.25)
CREAT SERPL-MCNC: 1.4 MG/DL (ref 0.66–1.25)
FASTING STATUS PATIENT QL REPORTED: YES
GFR SERPL CREATININE-BSD FRML MDRD: 55 ML/MIN/1.73M2
GFR SERPL CREATININE-BSD FRML MDRD: 60 ML/MIN/1.73M2
GLUCOSE BLD-MCNC: 124 MG/DL (ref 70–99)
GLUCOSE BLD-MCNC: 153 MG/DL (ref 70–99)
GLUCOSE BLDC GLUCOMTR-MCNC: 148 MG/DL (ref 70–99)
GLUCOSE BLDC GLUCOMTR-MCNC: 178 MG/DL (ref 70–99)
HGB BLD-MCNC: 12.9 G/DL (ref 13.3–17.7)
POTASSIUM BLD-SCNC: 3.8 MMOL/L (ref 3.4–5.3)
POTASSIUM BLD-SCNC: 4.1 MMOL/L (ref 3.4–5.3)
SODIUM SERPL-SCNC: 139 MMOL/L (ref 133–144)
SPECIMEN EXPIRATION DATE: NORMAL

## 2022-11-25 PROCEDURE — 250N000011 HC RX IP 250 OP 636: Performed by: ANESTHESIOLOGY

## 2022-11-25 PROCEDURE — 82565 ASSAY OF CREATININE: CPT | Performed by: ANESTHESIOLOGY

## 2022-11-25 PROCEDURE — 250N000009 HC RX 250: Performed by: UROLOGY

## 2022-11-25 PROCEDURE — 258N000003 HC RX IP 258 OP 636: Performed by: ANESTHESIOLOGY

## 2022-11-25 PROCEDURE — 250N000025 HC SEVOFLURANE, PER MIN: Performed by: UROLOGY

## 2022-11-25 PROCEDURE — 370N000017 HC ANESTHESIA TECHNICAL FEE, PER MIN: Performed by: UROLOGY

## 2022-11-25 PROCEDURE — 50543 LAPARO PARTIAL NEPHRECTOMY: CPT | Mod: LT | Performed by: UROLOGY

## 2022-11-25 PROCEDURE — 36415 COLL VENOUS BLD VENIPUNCTURE: CPT | Performed by: ANESTHESIOLOGY

## 2022-11-25 PROCEDURE — 96372 THER/PROPH/DIAG INJ SC/IM: CPT | Performed by: UROLOGY

## 2022-11-25 PROCEDURE — 84132 ASSAY OF SERUM POTASSIUM: CPT | Performed by: ANESTHESIOLOGY

## 2022-11-25 PROCEDURE — 999N000141 HC STATISTIC PRE-PROCEDURE NURSING ASSESSMENT: Performed by: UROLOGY

## 2022-11-25 PROCEDURE — 258N000003 HC RX IP 258 OP 636: Performed by: UROLOGY

## 2022-11-25 PROCEDURE — 82947 ASSAY GLUCOSE BLOOD QUANT: CPT | Performed by: ANESTHESIOLOGY

## 2022-11-25 PROCEDURE — 710N000009 HC RECOVERY PHASE 1, LEVEL 1, PER MIN: Performed by: UROLOGY

## 2022-11-25 PROCEDURE — 250N000013 HC RX MED GY IP 250 OP 250 PS 637: Performed by: UROLOGY

## 2022-11-25 PROCEDURE — 82947 ASSAY GLUCOSE BLOOD QUANT: CPT | Mod: 91 | Performed by: UROLOGY

## 2022-11-25 PROCEDURE — 82962 GLUCOSE BLOOD TEST: CPT

## 2022-11-25 PROCEDURE — 86901 BLOOD TYPING SEROLOGIC RH(D): CPT | Performed by: UROLOGY

## 2022-11-25 PROCEDURE — 88331 PATH CONSLTJ SURG 1 BLK 1SPC: CPT | Mod: TC,59 | Performed by: UROLOGY

## 2022-11-25 PROCEDURE — 250N000009 HC RX 250: Performed by: ANESTHESIOLOGY

## 2022-11-25 PROCEDURE — 360N000080 HC SURGERY LEVEL 7, PER MIN: Performed by: UROLOGY

## 2022-11-25 PROCEDURE — 272N000001 HC OR GENERAL SUPPLY STERILE: Performed by: UROLOGY

## 2022-11-25 PROCEDURE — 85018 HEMOGLOBIN: CPT | Performed by: UROLOGY

## 2022-11-25 PROCEDURE — 250N000012 HC RX MED GY IP 250 OP 636 PS 637: Performed by: UROLOGY

## 2022-11-25 PROCEDURE — 258N000001 HC RX 258: Performed by: UROLOGY

## 2022-11-25 PROCEDURE — 250N000011 HC RX IP 250 OP 636: Performed by: UROLOGY

## 2022-11-25 PROCEDURE — 76770 US EXAM ABDO BACK WALL COMP: CPT | Mod: 26 | Performed by: UROLOGY

## 2022-11-25 RX ORDER — CALCIUM CARBONATE 500 MG/1
500 TABLET, CHEWABLE ORAL 4 TIMES DAILY PRN
Status: DISCONTINUED | OUTPATIENT
Start: 2022-11-25 | End: 2022-11-26 | Stop reason: HOSPADM

## 2022-11-25 RX ORDER — HYDROMORPHONE HCL IN WATER/PF 6 MG/30 ML
0.4 PATIENT CONTROLLED ANALGESIA SYRINGE INTRAVENOUS EVERY 5 MIN PRN
Status: DISCONTINUED | OUTPATIENT
Start: 2022-11-25 | End: 2022-11-25 | Stop reason: HOSPADM

## 2022-11-25 RX ORDER — FENTANYL CITRATE 0.05 MG/ML
25 INJECTION, SOLUTION INTRAMUSCULAR; INTRAVENOUS EVERY 5 MIN PRN
Status: DISCONTINUED | OUTPATIENT
Start: 2022-11-25 | End: 2022-11-25 | Stop reason: HOSPADM

## 2022-11-25 RX ORDER — LIDOCAINE 40 MG/G
CREAM TOPICAL
Status: DISCONTINUED | OUTPATIENT
Start: 2022-11-25 | End: 2022-11-26 | Stop reason: HOSPADM

## 2022-11-25 RX ORDER — HYDROMORPHONE HCL IN WATER/PF 6 MG/30 ML
0.4 PATIENT CONTROLLED ANALGESIA SYRINGE INTRAVENOUS
Status: DISCONTINUED | OUTPATIENT
Start: 2022-11-25 | End: 2022-11-26 | Stop reason: HOSPADM

## 2022-11-25 RX ORDER — ONDANSETRON 4 MG/1
4 TABLET, ORALLY DISINTEGRATING ORAL EVERY 30 MIN PRN
Status: DISCONTINUED | OUTPATIENT
Start: 2022-11-25 | End: 2022-11-25 | Stop reason: HOSPADM

## 2022-11-25 RX ORDER — SODIUM CHLORIDE, SODIUM LACTATE, POTASSIUM CHLORIDE, CALCIUM CHLORIDE 600; 310; 30; 20 MG/100ML; MG/100ML; MG/100ML; MG/100ML
INJECTION, SOLUTION INTRAVENOUS CONTINUOUS
Status: DISCONTINUED | OUTPATIENT
Start: 2022-11-25 | End: 2022-11-25 | Stop reason: HOSPADM

## 2022-11-25 RX ORDER — NICOTINE POLACRILEX 4 MG
15-30 LOZENGE BUCCAL
Status: DISCONTINUED | OUTPATIENT
Start: 2022-11-25 | End: 2022-11-26 | Stop reason: HOSPADM

## 2022-11-25 RX ORDER — METOPROLOL SUCCINATE 50 MG/1
50 TABLET, EXTENDED RELEASE ORAL EVERY EVENING
Status: DISCONTINUED | OUTPATIENT
Start: 2022-11-25 | End: 2022-11-26 | Stop reason: HOSPADM

## 2022-11-25 RX ORDER — OXYCODONE HYDROCHLORIDE 5 MG/1
10 TABLET ORAL EVERY 4 HOURS PRN
Status: DISCONTINUED | OUTPATIENT
Start: 2022-11-25 | End: 2022-11-26 | Stop reason: HOSPADM

## 2022-11-25 RX ORDER — SODIUM CHLORIDE 9 MG/ML
INJECTION, SOLUTION INTRAVENOUS CONTINUOUS
Status: DISCONTINUED | OUTPATIENT
Start: 2022-11-25 | End: 2022-11-26 | Stop reason: HOSPADM

## 2022-11-25 RX ORDER — ACETAMINOPHEN 325 MG/1
650 TABLET ORAL EVERY 6 HOURS
Status: DISCONTINUED | OUTPATIENT
Start: 2022-11-25 | End: 2022-11-26 | Stop reason: HOSPADM

## 2022-11-25 RX ORDER — NALOXONE HYDROCHLORIDE 0.4 MG/ML
0.4 INJECTION, SOLUTION INTRAMUSCULAR; INTRAVENOUS; SUBCUTANEOUS
Status: DISCONTINUED | OUTPATIENT
Start: 2022-11-25 | End: 2022-11-26 | Stop reason: HOSPADM

## 2022-11-25 RX ORDER — HYDROMORPHONE HCL IN WATER/PF 6 MG/30 ML
0.2 PATIENT CONTROLLED ANALGESIA SYRINGE INTRAVENOUS
Status: DISCONTINUED | OUTPATIENT
Start: 2022-11-25 | End: 2022-11-26 | Stop reason: HOSPADM

## 2022-11-25 RX ORDER — LISINOPRIL AND HYDROCHLOROTHIAZIDE 20; 25 MG/1; MG/1
1 TABLET ORAL EVERY EVENING
Status: DISCONTINUED | OUTPATIENT
Start: 2022-11-25 | End: 2022-11-26 | Stop reason: HOSPADM

## 2022-11-25 RX ORDER — MAGNESIUM HYDROXIDE 1200 MG/15ML
LIQUID ORAL PRN
Status: DISCONTINUED | OUTPATIENT
Start: 2022-11-25 | End: 2022-11-25 | Stop reason: HOSPADM

## 2022-11-25 RX ORDER — AMOXICILLIN 250 MG
1 CAPSULE ORAL 2 TIMES DAILY
Status: DISCONTINUED | OUTPATIENT
Start: 2022-11-25 | End: 2022-11-26 | Stop reason: HOSPADM

## 2022-11-25 RX ORDER — GLYCOPYRROLATE 0.2 MG/ML
INJECTION, SOLUTION INTRAMUSCULAR; INTRAVENOUS PRN
Status: DISCONTINUED | OUTPATIENT
Start: 2022-11-25 | End: 2022-11-25

## 2022-11-25 RX ORDER — OXYCODONE HYDROCHLORIDE 5 MG/1
5 TABLET ORAL EVERY 4 HOURS PRN
Status: DISCONTINUED | OUTPATIENT
Start: 2022-11-25 | End: 2022-11-26 | Stop reason: HOSPADM

## 2022-11-25 RX ORDER — LABETALOL HYDROCHLORIDE 5 MG/ML
INJECTION, SOLUTION INTRAVENOUS PRN
Status: DISCONTINUED | OUTPATIENT
Start: 2022-11-25 | End: 2022-11-25

## 2022-11-25 RX ORDER — ONDANSETRON 2 MG/ML
4 INJECTION INTRAMUSCULAR; INTRAVENOUS EVERY 6 HOURS PRN
Status: DISCONTINUED | OUTPATIENT
Start: 2022-11-25 | End: 2022-11-26 | Stop reason: HOSPADM

## 2022-11-25 RX ORDER — NALOXONE HYDROCHLORIDE 0.4 MG/ML
0.2 INJECTION, SOLUTION INTRAMUSCULAR; INTRAVENOUS; SUBCUTANEOUS
Status: DISCONTINUED | OUTPATIENT
Start: 2022-11-25 | End: 2022-11-26 | Stop reason: HOSPADM

## 2022-11-25 RX ORDER — HYDROMORPHONE HCL IN WATER/PF 6 MG/30 ML
0.2 PATIENT CONTROLLED ANALGESIA SYRINGE INTRAVENOUS EVERY 5 MIN PRN
Status: DISCONTINUED | OUTPATIENT
Start: 2022-11-25 | End: 2022-11-25 | Stop reason: HOSPADM

## 2022-11-25 RX ORDER — NEOSTIGMINE METHYLSULFATE 1 MG/ML
VIAL (ML) INJECTION PRN
Status: DISCONTINUED | OUTPATIENT
Start: 2022-11-25 | End: 2022-11-25

## 2022-11-25 RX ORDER — FENTANYL CITRATE 50 UG/ML
INJECTION, SOLUTION INTRAMUSCULAR; INTRAVENOUS PRN
Status: DISCONTINUED | OUTPATIENT
Start: 2022-11-25 | End: 2022-11-25

## 2022-11-25 RX ORDER — ONDANSETRON 2 MG/ML
INJECTION INTRAMUSCULAR; INTRAVENOUS PRN
Status: DISCONTINUED | OUTPATIENT
Start: 2022-11-25 | End: 2022-11-25

## 2022-11-25 RX ORDER — DEXTROSE MONOHYDRATE 25 G/50ML
25-50 INJECTION, SOLUTION INTRAVENOUS
Status: DISCONTINUED | OUTPATIENT
Start: 2022-11-25 | End: 2022-11-26 | Stop reason: HOSPADM

## 2022-11-25 RX ORDER — HYDROMORPHONE HYDROCHLORIDE 1 MG/ML
INJECTION, SOLUTION INTRAMUSCULAR; INTRAVENOUS; SUBCUTANEOUS PRN
Status: DISCONTINUED | OUTPATIENT
Start: 2022-11-25 | End: 2022-11-25

## 2022-11-25 RX ORDER — FENTANYL CITRATE 0.05 MG/ML
50 INJECTION, SOLUTION INTRAMUSCULAR; INTRAVENOUS EVERY 5 MIN PRN
Status: DISCONTINUED | OUTPATIENT
Start: 2022-11-25 | End: 2022-11-25 | Stop reason: HOSPADM

## 2022-11-25 RX ORDER — BUPIVACAINE HYDROCHLORIDE 2.5 MG/ML
INJECTION, SOLUTION EPIDURAL; INFILTRATION; INTRACAUDAL PRN
Status: DISCONTINUED | OUTPATIENT
Start: 2022-11-25 | End: 2022-11-25 | Stop reason: HOSPADM

## 2022-11-25 RX ORDER — CEFAZOLIN SODIUM 1 G/3ML
INJECTION, POWDER, FOR SOLUTION INTRAMUSCULAR; INTRAVENOUS PRN
Status: DISCONTINUED | OUTPATIENT
Start: 2022-11-25 | End: 2022-11-25

## 2022-11-25 RX ORDER — DEXAMETHASONE SODIUM PHOSPHATE 4 MG/ML
INJECTION, SOLUTION INTRA-ARTICULAR; INTRALESIONAL; INTRAMUSCULAR; INTRAVENOUS; SOFT TISSUE PRN
Status: DISCONTINUED | OUTPATIENT
Start: 2022-11-25 | End: 2022-11-25

## 2022-11-25 RX ORDER — ONDANSETRON 4 MG/1
4 TABLET, ORALLY DISINTEGRATING ORAL EVERY 6 HOURS PRN
Status: DISCONTINUED | OUTPATIENT
Start: 2022-11-25 | End: 2022-11-26 | Stop reason: HOSPADM

## 2022-11-25 RX ORDER — PROPOFOL 10 MG/ML
INJECTION, EMULSION INTRAVENOUS PRN
Status: DISCONTINUED | OUTPATIENT
Start: 2022-11-25 | End: 2022-11-25

## 2022-11-25 RX ORDER — AMLODIPINE BESYLATE 5 MG/1
5 TABLET ORAL EVERY EVENING
Status: DISCONTINUED | OUTPATIENT
Start: 2022-11-25 | End: 2022-11-26 | Stop reason: HOSPADM

## 2022-11-25 RX ORDER — ONDANSETRON 2 MG/ML
4 INJECTION INTRAMUSCULAR; INTRAVENOUS EVERY 30 MIN PRN
Status: DISCONTINUED | OUTPATIENT
Start: 2022-11-25 | End: 2022-11-25 | Stop reason: HOSPADM

## 2022-11-25 RX ORDER — ATORVASTATIN CALCIUM 20 MG/1
20 TABLET, FILM COATED ORAL EVERY EVENING
Status: DISCONTINUED | OUTPATIENT
Start: 2022-11-25 | End: 2022-11-26 | Stop reason: HOSPADM

## 2022-11-25 RX ORDER — LIDOCAINE HYDROCHLORIDE 20 MG/ML
INJECTION, SOLUTION INFILTRATION; PERINEURAL PRN
Status: DISCONTINUED | OUTPATIENT
Start: 2022-11-25 | End: 2022-11-25

## 2022-11-25 RX ADMIN — SENNOSIDES AND DOCUSATE SODIUM 1 TABLET: 50; 8.6 TABLET ORAL at 20:55

## 2022-11-25 RX ADMIN — GLYCOPYRROLATE 0.6 MG: 0.2 INJECTION, SOLUTION INTRAMUSCULAR; INTRAVENOUS at 12:15

## 2022-11-25 RX ADMIN — FENTANYL CITRATE 100 MCG: 50 INJECTION, SOLUTION INTRAMUSCULAR; INTRAVENOUS at 08:46

## 2022-11-25 RX ADMIN — AMLODIPINE BESYLATE 5 MG: 5 TABLET ORAL at 20:55

## 2022-11-25 RX ADMIN — DEXMEDETOMIDINE HYDROCHLORIDE 12 MCG: 100 INJECTION, SOLUTION INTRAVENOUS at 09:50

## 2022-11-25 RX ADMIN — SODIUM CHLORIDE: 9 INJECTION, SOLUTION INTRAVENOUS at 15:59

## 2022-11-25 RX ADMIN — PROPOFOL 250 MG: 10 INJECTION, EMULSION INTRAVENOUS at 08:46

## 2022-11-25 RX ADMIN — ROCURONIUM BROMIDE 50 MG: 50 INJECTION, SOLUTION INTRAVENOUS at 08:46

## 2022-11-25 RX ADMIN — DEXAMETHASONE SODIUM PHOSPHATE 4 MG: 4 INJECTION, SOLUTION INTRA-ARTICULAR; INTRALESIONAL; INTRAMUSCULAR; INTRAVENOUS; SOFT TISSUE at 08:50

## 2022-11-25 RX ADMIN — FENTANYL CITRATE 50 MCG: 50 INJECTION, SOLUTION INTRAMUSCULAR; INTRAVENOUS at 14:32

## 2022-11-25 RX ADMIN — ROCURONIUM BROMIDE 20 MG: 50 INJECTION, SOLUTION INTRAVENOUS at 10:45

## 2022-11-25 RX ADMIN — ROCURONIUM BROMIDE 20 MG: 50 INJECTION, SOLUTION INTRAVENOUS at 09:15

## 2022-11-25 RX ADMIN — LABETALOL HYDROCHLORIDE 10 MG: 5 INJECTION INTRAVENOUS at 10:46

## 2022-11-25 RX ADMIN — MIDAZOLAM 2 MG: 1 INJECTION INTRAMUSCULAR; INTRAVENOUS at 08:42

## 2022-11-25 RX ADMIN — ATORVASTATIN CALCIUM 20 MG: 20 TABLET, FILM COATED ORAL at 20:55

## 2022-11-25 RX ADMIN — LABETALOL HYDROCHLORIDE 10 MG: 5 INJECTION INTRAVENOUS at 10:27

## 2022-11-25 RX ADMIN — PROPOFOL 50 MG: 10 INJECTION, EMULSION INTRAVENOUS at 09:38

## 2022-11-25 RX ADMIN — PROPOFOL 30 MCG/KG/MIN: 10 INJECTION, EMULSION INTRAVENOUS at 09:48

## 2022-11-25 RX ADMIN — ACETAMINOPHEN 650 MG: 325 TABLET, FILM COATED ORAL at 20:55

## 2022-11-25 RX ADMIN — CEFAZOLIN 2 G: 1 INJECTION, POWDER, FOR SOLUTION INTRAMUSCULAR; INTRAVENOUS at 08:46

## 2022-11-25 RX ADMIN — INSULIN ASPART 1 UNITS: 100 INJECTION, SOLUTION INTRAVENOUS; SUBCUTANEOUS at 19:16

## 2022-11-25 RX ADMIN — ONDANSETRON 4 MG: 2 INJECTION INTRAMUSCULAR; INTRAVENOUS at 12:11

## 2022-11-25 RX ADMIN — HYDROMORPHONE HYDROCHLORIDE 0.5 MG: 1 INJECTION, SOLUTION INTRAMUSCULAR; INTRAVENOUS; SUBCUTANEOUS at 09:21

## 2022-11-25 RX ADMIN — SODIUM CHLORIDE, POTASSIUM CHLORIDE, SODIUM LACTATE AND CALCIUM CHLORIDE: 600; 310; 30; 20 INJECTION, SOLUTION INTRAVENOUS at 07:43

## 2022-11-25 RX ADMIN — FENTANYL CITRATE 50 MCG: 50 INJECTION, SOLUTION INTRAMUSCULAR; INTRAVENOUS at 13:10

## 2022-11-25 RX ADMIN — OXYCODONE HYDROCHLORIDE 5 MG: 5 TABLET ORAL at 17:59

## 2022-11-25 RX ADMIN — FENTANYL CITRATE 50 MCG: 50 INJECTION, SOLUTION INTRAMUSCULAR; INTRAVENOUS at 13:40

## 2022-11-25 RX ADMIN — ROCURONIUM BROMIDE 10 MG: 50 INJECTION, SOLUTION INTRAVENOUS at 11:23

## 2022-11-25 RX ADMIN — LISINOPRIL AND HYDROCHLOROTHIAZIDE 1 TABLET: 25; 20 TABLET ORAL at 20:55

## 2022-11-25 RX ADMIN — METOPROLOL SUCCINATE 50 MG: 50 TABLET, EXTENDED RELEASE ORAL at 20:55

## 2022-11-25 RX ADMIN — NEOSTIGMINE METHYLSULFATE 5 MG: 1 INJECTION, SOLUTION INTRAVENOUS at 12:15

## 2022-11-25 RX ADMIN — ROCURONIUM BROMIDE 30 MG: 50 INJECTION, SOLUTION INTRAVENOUS at 09:48

## 2022-11-25 RX ADMIN — LIDOCAINE HYDROCHLORIDE 100 MG: 20 INJECTION, SOLUTION INFILTRATION; PERINEURAL at 08:46

## 2022-11-25 ASSESSMENT — ACTIVITIES OF DAILY LIVING (ADL)
ADLS_ACUITY_SCORE: 35

## 2022-11-25 NOTE — ANESTHESIA CARE TRANSFER NOTE
Patient: Brian Flynn    Procedure: Procedure(s):  ROBOTIC ASSISTED LEFT PARTIAL NEPHRECTOMY       Diagnosis: Left renal mass [N28.89]  Diagnosis Additional Information: No value filed.    Anesthesia Type:   General     Note:    Oropharynx: oropharynx clear of all foreign objects  Level of Consciousness: awake  Oxygen Supplementation: face mask    Independent Airway: airway patency satisfactory and stable  Dentition: dentition unchanged  Vital Signs Stable: post-procedure vital signs reviewed and stable  Report to RN Given: handoff report given  Patient transferred to: PACU    Handoff Report: Identifed the Patient, Identified the Reponsible Provider, Reviewed the pertinent medical history, Discussed the surgical course, Reviewed Intra-OP anesthesia mangement and issues during anesthesia, Set expectations for post-procedure period and Allowed opportunity for questions and acknowledgement of understanding        Electronically Signed By: KENN Hassan CRNA  November 25, 2022  12:39 PM

## 2022-11-25 NOTE — OP NOTE
DATE OF SURGERY: 11/25/22  PREOPERATIVE DIAGNOSIS:  Left renal mass.   POSTOPERATIVE DIAGNOSIS: Left renal mass.   PROCEDURE PERFORMED:   1) Left robotic-assisted laparoscopic partial nephrectomy;   2) Laparoscopic ultrasound of renal mass with intraoperative interpretation of imaging      STAFF SURGEON: Alcon Whittaker MD, present and scrubbed for all critical portions of the procedure, including the entire radiographic portion.  RESIDENT SURGEON: Sandeep Parks MD  ASSISTANT: Chante Gutierrez  ANESTHESIA: General.   ESTIMATED BLOOD LOSS: 500 mL.   DRAINS AND TUBES: Ott catheter; MARTI drain  COMPLICATIONS: None.   DISPOSITION: PACU.   SPECIMENS OBTAINED:   ID Type Source Tests Collected by Time Destination   1 : JENAE-TUMOR FAT Tissue Other SURGICAL PATHOLOGY EXAM Alcon Whittaker MD 11/25/2022 10:11 AM    2 : BASE OF TUMOR BED - MEDIAL Tissue Kidney, Left SURGICAL PATHOLOGY EXAM Alcon Whittaker MD 11/25/2022 11:16 AM    3 : BASE OF TUMOR BED - LATERAL Tissue Kidney, Left SURGICAL PATHOLOGY EXAM Alcon Whittaker MD 11/25/2022 11:18 AM    4 : JENAE-RENAL FAT Tissue Other SURGICAL PATHOLOGY EXAM Alcon Whittaker MD 11/25/2022 12:14 PM    5 : LEFT RENAL MASS - SUTURE REAPPROXIMATES RENAL CAPSULE Tissue Kidney, Left SURGICAL PATHOLOGY EXAM Alcon Whittaker MD 11/25/2022 12:14 PM      SIGNIFICANT FINDINGS: Tumor was resected with visually negative margins. Frozen section from base of tumor bed was negative for malignancy.    HISTORY OF PRESENT ILLNESS: Jeremi Flynn is a 67 year old male with a history of a left-sided upper pole renal mass. After a discussion of all risks, benefits, and alternatives, the patient elected to undergo definitive management with a partial nephrectomy.  OPERATION PERFORMED:   Informed consent was obtained and the patient was brought to the operating room where general anesthesia was induced. He was given appropriate preoperative  antibiotics and positioned in the full flank position where all pressure points were carefully padded and secured. He was then prepped and draped in the usual sterile fashion. We then performed a timeout, verifying the correct patient's site and procedure to be performed.    Incision was made superior and lateral to the umbilicus a the lateral edge of the rectus. After confirmatory drop test, the Veress needle was used for insufflation. We placed a 12 mm assistant port and three 8 mm robotic ports. The robot was docked. The colon was reflected medially to expose the anterior surface of Gerota's fascia. The medial aspect of the kidney was exposed and the ureter and gonadal vein were identified. Kidney was lifted off the psoas muscle and dissection was carried posteriorly until the renal artery and vein were identified. The hilar vessels were carefully skeletonized. Once this was exposed, Gerota's was opened and the kidney was completely mobilized. The mass location was noted at the medial upper pole. The ultrasound probe was then used to verify location of mass and outline the borders for dissection.  Artery was then clamped and excised the tumor using sharp dissection. Once the tumor was free, the base of the resection bed using was oversewn using 3-0 V-lock suture. At this point we unclamped the hilum; total clamp time was 25 minutes. Frozen sections were sent from resection bed which were negative for malignancy. There were two areas on the specimen with capsular tears during dissection, and these were re-approximated on the back table after extraction. The defect in the parenchyma was then closed using the interrupred 0-Vicryl sutures for capsular closure with sliding weck technique. A second weck placed for security on each stitch. Theses were placed over Surgicel and Floseal. Hemostasis appeared excellent. Gerota's fascia was re approximated over the kidney and resection bed.  Specimen was placed in a 10 mm  EndoCatch bag.  MARTI drain was inserted. Ports were removed. The specimen was removed and sent to pathology. We closed the fascia from the extraction site with 0-Vicryl in an interrupted fashion. Skin was re approximated using 4-0 Monocryl. The wounds were dressed with skin glue. The patient was then awakened and taken to the PACU in stable condition.  Alcon Whittaker MD  Urology  [facility]

## 2022-11-25 NOTE — ANESTHESIA PROCEDURE NOTES
Airway       Patient location during procedure: OR       Procedure Start/Stop Times: 11/25/2022 8:48 AM  Staff -        CRNA: Dayan Guzmán APRN CRNA       Performed By: CRNA  Consent for Airway        Urgency: elective  Indications and Patient Condition       Indications for airway management: marco antonio-procedural       Induction type:intravenous       Mask difficulty assessment: 1 - vent by mask    Final Airway Details       Final airway type: endotracheal airway       Successful airway: ETT - single  Endotracheal Airway Details        ETT size (mm): 8.0       Cuffed: yes       Cuff volume (mL): 10       Successful intubation technique: video laryngoscopy       VL Blade Size: Gandara 4       Grade View of Cords: 1       Adjucts: stylet       Position: Right       Measured from: gums/teeth       Secured at (cm): 23       Bite block used: None    Post intubation assessment        Placement verified by: capnometry, equal breath sounds and chest rise        Number of attempts at approach: 1       Secured with: pink tape       Ease of procedure: easy       Dentition: Intact and Unchanged    Medication(s) Administered   Medication Administration Time: 11/25/2022 8:48 AM

## 2022-11-26 VITALS
RESPIRATION RATE: 18 BRPM | BODY MASS INDEX: 37.34 KG/M2 | SYSTOLIC BLOOD PRESSURE: 145 MMHG | TEMPERATURE: 99 F | DIASTOLIC BLOOD PRESSURE: 66 MMHG | HEART RATE: 79 BPM | WEIGHT: 260.8 LBS | OXYGEN SATURATION: 92 % | HEIGHT: 70 IN

## 2022-11-26 LAB
ANION GAP SERPL CALCULATED.3IONS-SCNC: 9 MMOL/L (ref 3–14)
BUN SERPL-MCNC: 36 MG/DL (ref 7–30)
CALCIUM SERPL-MCNC: 8.5 MG/DL (ref 8.5–10.1)
CHLORIDE BLD-SCNC: 107 MMOL/L (ref 94–109)
CO2 SERPL-SCNC: 24 MMOL/L (ref 20–32)
CREAT SERPL-MCNC: 1.99 MG/DL (ref 0.66–1.25)
ERYTHROCYTE [DISTWIDTH] IN BLOOD BY AUTOMATED COUNT: 12.6 % (ref 10–15)
GFR SERPL CREATININE-BSD FRML MDRD: 36 ML/MIN/1.73M2
GLUCOSE BLD-MCNC: 121 MG/DL (ref 70–99)
GLUCOSE BLDC GLUCOMTR-MCNC: 122 MG/DL (ref 70–99)
GLUCOSE BLDC GLUCOMTR-MCNC: 133 MG/DL (ref 70–99)
GLUCOSE BLDC GLUCOMTR-MCNC: 148 MG/DL (ref 70–99)
HCT VFR BLD AUTO: 38.3 % (ref 40–53)
HGB BLD-MCNC: 12.8 G/DL (ref 13.3–17.7)
MCH RBC QN AUTO: 29.6 PG (ref 26.5–33)
MCHC RBC AUTO-ENTMCNC: 33.4 G/DL (ref 31.5–36.5)
MCV RBC AUTO: 89 FL (ref 78–100)
PLATELET # BLD AUTO: 231 10E3/UL (ref 150–450)
POTASSIUM BLD-SCNC: 4.1 MMOL/L (ref 3.4–5.3)
RBC # BLD AUTO: 4.32 10E6/UL (ref 4.4–5.9)
SODIUM SERPL-SCNC: 140 MMOL/L (ref 133–144)
WBC # BLD AUTO: 12.1 10E3/UL (ref 4–11)

## 2022-11-26 PROCEDURE — 250N000013 HC RX MED GY IP 250 OP 250 PS 637: Performed by: UROLOGY

## 2022-11-26 PROCEDURE — 36415 COLL VENOUS BLD VENIPUNCTURE: CPT | Performed by: UROLOGY

## 2022-11-26 PROCEDURE — 80048 BASIC METABOLIC PNL TOTAL CA: CPT | Performed by: UROLOGY

## 2022-11-26 PROCEDURE — 85027 COMPLETE CBC AUTOMATED: CPT | Performed by: UROLOGY

## 2022-11-26 PROCEDURE — 82962 GLUCOSE BLOOD TEST: CPT

## 2022-11-26 RX ORDER — ONDANSETRON 4 MG/1
4 TABLET, ORALLY DISINTEGRATING ORAL EVERY 8 HOURS PRN
Qty: 12 TABLET | Refills: 0 | Status: SHIPPED | OUTPATIENT
Start: 2022-11-26

## 2022-11-26 RX ORDER — SENNA AND DOCUSATE SODIUM 50; 8.6 MG/1; MG/1
1 TABLET, FILM COATED ORAL AT BEDTIME
Qty: 12 TABLET | Refills: 0 | Status: SHIPPED | OUTPATIENT
Start: 2022-11-26 | End: 2022-12-08

## 2022-11-26 RX ORDER — OXYCODONE HYDROCHLORIDE 5 MG/1
5 TABLET ORAL EVERY 6 HOURS PRN
Qty: 12 TABLET | Refills: 0 | Status: SHIPPED | OUTPATIENT
Start: 2022-11-26 | End: 2022-11-29

## 2022-11-26 RX ADMIN — ACETAMINOPHEN 650 MG: 325 TABLET, FILM COATED ORAL at 02:34

## 2022-11-26 RX ADMIN — SENNOSIDES AND DOCUSATE SODIUM 1 TABLET: 50; 8.6 TABLET ORAL at 08:46

## 2022-11-26 RX ADMIN — ACETAMINOPHEN 650 MG: 325 TABLET, FILM COATED ORAL at 08:45

## 2022-11-26 RX ADMIN — OXYCODONE HYDROCHLORIDE 5 MG: 5 TABLET ORAL at 11:02

## 2022-11-26 RX ADMIN — OXYCODONE HYDROCHLORIDE 5 MG: 5 TABLET ORAL at 00:34

## 2022-11-26 ASSESSMENT — ACTIVITIES OF DAILY LIVING (ADL)
ADLS_ACUITY_SCORE: 35
ADLS_ACUITY_SCORE: 40

## 2022-11-26 NOTE — PLAN OF CARE
POD 0 from a ROBOTIC ASSISTED LEFT PARTIAL NEPHRECTOMY. A&Ox4. CMS intact. Bowel sounds normoactive, passing flatus, tolerating full liquid diet. VSS on 1LPM oxygen. NS running @ 100mL/hr. Lap sites secured by surgical glue. MARTI has moderate drainage around the site; put to bulb suction with serosanguinous drainage. Ott in place with straw colored urine. Up with SBA. C/o abdominal pain, decreased with oxycodone.

## 2022-11-26 NOTE — PLAN OF CARE
Goal Outcome Evaluation:  Pt given discharge instructions and verbalizes understanding of these and Rx's filled at hospital pharmacy and given to pt with instructions. Dressing remains dry and intact to left lower abdomen where palomo was removed earlier extra dressings sent with pt. Tolerating diet and activity and denies pain at discharge. Escorted to door in w/c by GEMINI

## 2022-11-26 NOTE — ANESTHESIA POSTPROCEDURE EVALUATION
Patient: Brian Flynn    Procedure: Procedure(s):  ROBOTIC ASSISTED LEFT PARTIAL NEPHRECTOMY       Anesthesia Type:  General    Note:  Disposition: Admission   Postop Pain Control: Uneventful            Sign Out: Well controlled pain   PONV: No   Neuro/Psych: Uneventful            Sign Out: Acceptable/Baseline neuro status   Airway/Respiratory: Uneventful            Sign Out: Acceptable/Baseline resp. status   CV/Hemodynamics: Uneventful            Sign Out: Acceptable CV status; No obvious hypovolemia; No obvious fluid overload   Other NRE: NONE   DID A NON-ROUTINE EVENT OCCUR? No           Last vitals:  Vitals Value Taken Time   /74 11/25/22 1431   Temp 36.7  C (98  F) 11/25/22 1410   Pulse 65 11/25/22 1438   Resp 7 11/25/22 1438   SpO2 94 % 11/25/22 1440   Vitals shown include unvalidated device data.    Electronically Signed By: Eve Singh MD  November 25, 2022  7:01 PM

## 2022-11-26 NOTE — DISCHARGE INSTRUCTIONS
"Activity  - No strenuous exercise for 6 weeks.  - No lifting, pushing, pulling more than 10 pounds for 6 weeks.   - Do not strain with bowel movements.  - Do not drive until you can press the brake pedal quickly and fully without pain.   - Do not operate a motor vehicle while taking narcotic pain medications.     Incisions  - You may shower and get incisions wet starting 48 hrs after surgery.  - Do not scrub incisions or submerge wounds for 2 weeks or until seen in follow-up.   - Remove wound dressing 48 hours after surgery.   - Leave incision open to air. Cover with gauze only if needed for comfort or to protect clothing from drainage.   - The stitches do not need to be removed, they will dissolve on their own.    Medications  - Take Tylenol/oxycodone for pain as needed  - Bacitracin ointment to apply locally once a day   Follow-Up:  - Call your primary care provider to touch base regarding your recent admission.   - follow-up with Dr Whittaker as planned  - Call or return sooner than your regularly scheduled visit if you develop any of the following: fever (greater than 101.5), uncontrolled pain, uncontrolled nausea or vomiting, as well as increased redness, swelling, or drainage from your wound.     Phone numbers:   - Monday through Friday 8am to 4:30pm: Call [phone] with questions or to schedule or confirm appointment.    - Nights or weekends: call [phone] and tell the  \"I would like to page the Urology  on call.\"  - For emergencies, call [phone]   "

## 2022-11-26 NOTE — PROGRESS NOTES
Cornelio drain removed to left lower quadrant per  Order. Pt tolerated new dressing applied and extra dressings will be sent home with patient.

## 2022-11-26 NOTE — PLAN OF CARE
POD 1 left partial nephrectomy.  VSS, DON. MARTI to bulb suction, mild leaking at site, dark red output.  Pain managed with PRN oxycodone and scheduled Tylenol.  Tolerating full liquids, denies nausea, +BS.

## 2022-11-26 NOTE — PROGRESS NOTES
When pt getting up to bathroom iv site leaking and removed pt stating he is tolerating liquids/diet without nausea saline lock removed  And not restarted at this time. Pt did void in toilet not measured

## 2022-11-26 NOTE — PROGRESS NOTES
Urology Daily Progress Note    24 hour events/Subjective:     - No acute events overnight   - Pain well controlled on current regimen   - Tolerating regular diet ; no nausea or vomiting   - Passing flatus, no bowel movement   - Ambulated x 2 yesterday       O:  Vitals: Afebrile, VSS  General: Alert, interactive, in NAD  Resp: Non-labored breathing on RA  Abdomen: Soft, appropriately-tender, non distended. Incision(s) c/d/i w/ dermabond; no erythema or drainage.   Ext: Warm and well perfused, No LE edema   Ott: Ott catheter removed in the morning voided x2   Drain: Serosanguinous    I/O last 3 completed shifts:  In: 1800 [I.V.:1800]  Out: 1605 [Urine:1025; Drains:80; Blood:500] - Last 24 hours    I/O this shift:  In: 400 [P.O.:400]  Out: 100 [Urine:100] - Since Midnight      Labs/Imaging  Heme:Recent Labs   Lab 11/26/22  0809 11/25/22  1403   WBC 12.1*  --    HGB 12.8* 12.9*     --      Chem:  Recent Labs   Lab 11/26/22  0809 11/25/22  1403 11/25/22  0649   POTASSIUM 4.1 4.1 3.8   CR 1.99* 1.40* 1.29*       Assessment/Plan  67 year old y/o male POD#1 s/p robot-assisted laparoscopic left partial nephrectomy. Doing well    NEURO Pain well controlled on current regimen.     CV HDS.    PULM Aggressive pulmonary toilet    FEN/GI  advance diet as tolerated      Ott out voided x2   HEME Hgb as above.     ID Afebrile, no leukocytosis.    Antibiotics: Completed periprocedural antibiotics   ENDO No issues   ACTIVITY Up as tolerated  Ambulate at least TID    PPx SCDs, ambulation   DISPO Home, today          --    Erick Yates MD,   [facility]

## 2022-11-28 LAB
GLUCOSE BLDC GLUCOMTR-MCNC: 145 MG/DL (ref 70–99)
PATH REPORT.COMMENTS IMP SPEC: ABNORMAL
PATH REPORT.COMMENTS IMP SPEC: ABNORMAL
PATH REPORT.COMMENTS IMP SPEC: YES
PATH REPORT.FINAL DX SPEC: ABNORMAL
PATH REPORT.GROSS SPEC: ABNORMAL
PATH REPORT.INTRAOP OBS SPEC DOC: ABNORMAL
PATH REPORT.MICROSCOPIC SPEC OTHER STN: ABNORMAL
PATH REPORT.RELEVANT HX SPEC: ABNORMAL
PATHOLOGY SYNOPTIC REPORT: ABNORMAL
PHOTO IMAGE: ABNORMAL

## 2022-11-28 PROCEDURE — 88331 PATH CONSLTJ SURG 1 BLK 1SPC: CPT | Mod: 26 | Performed by: PATHOLOGY

## 2022-11-28 PROCEDURE — 88304 TISSUE EXAM BY PATHOLOGIST: CPT | Mod: 26 | Performed by: PATHOLOGY

## 2022-11-28 PROCEDURE — 88307 TISSUE EXAM BY PATHOLOGIST: CPT | Mod: 26 | Performed by: PATHOLOGY

## 2022-12-15 ENCOUNTER — OFFICE VISIT (OUTPATIENT)
Dept: UROLOGY | Facility: CLINIC | Age: 67
End: 2022-12-15
Payer: COMMERCIAL

## 2022-12-15 VITALS — HEIGHT: 70 IN | OXYGEN SATURATION: 96 % | HEART RATE: 83 BPM | BODY MASS INDEX: 35.79 KG/M2 | WEIGHT: 250 LBS

## 2022-12-15 DIAGNOSIS — C60.9 PENILE CANCER (H): ICD-10-CM

## 2022-12-15 DIAGNOSIS — R97.20 ELEVATED PROSTATE SPECIFIC ANTIGEN (PSA): ICD-10-CM

## 2022-12-15 DIAGNOSIS — C64.2 RENAL CELL CARCINOMA, LEFT (H): Primary | ICD-10-CM

## 2022-12-15 PROCEDURE — 99024 POSTOP FOLLOW-UP VISIT: CPT | Performed by: UROLOGY

## 2022-12-15 ASSESSMENT — PAIN SCALES - GENERAL: PAINLEVEL: NO PAIN (1)

## 2022-12-15 NOTE — PROGRESS NOTES
CHIEF COMPLAINT   It was my pleasure to see Brian Flynn who is a 67 year old male for follow-up of penile cancer, renal cell carcinoma, and nephrolithiasis.      HPI  Brian Flynn is a very pleasant 67 year old male who presents with a history of penile cancer. He noted burning with urination and palpable mass. Now s/p partial penectomy 6/3/2022. Demonstrated well-differentiated, stage pT1a. No palpable nodes or visible nodes on CT. Margins negative from resection. He overall is doing well and reports improvement in his voiding.      He also has history of left renal mass. Now s/p left robotic partial nephrectomy on 11/252022. 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 No complications from surgery.    Of note, he also had prior right ureteral stone extracted via ureteroscopy.     Left Robotic Partial Nephrectomy  11/25/2022  SPECIMEN   Procedure  Partial nephrectomy    Specimen Laterality  Left    TUMOR   Tumor Focality  Unifocal    Tumor Site  Not specified    Tumor Size  Greatest Dimension (Centimeters): 4.3 cm   Histologic Type  Papillary renal cell carcinoma, type 1    Histologic Grade (WHO / ISUP)  G2 (nucleoli conspicuous and eosinophilic at 400x magnification, visible but not prominent at 100x magnification)    Tumor Extent  Limited to kidney    Sarcomatoid Features  Not identified    Rhabdoid Features  Not identified    Tumor Necrosis  Present    MARGINS   Margin Status  All margins negative for invasive carcinoma    REGIONAL LYMPH NODES   Regional Lymph Node Status  Not applicable (no regional lymph nodes submitted or found)    PATHOLOGIC STAGE CLASSIFICATION (pTNM, AJCC 8th Edition)   Reporting of pT, pN, and (when applicable) pM categories is based on information available to the pathologist at the time the report is issued. As per the AJCC (Chapter 1, 8th Ed.) it is the managing physician s responsibility to establish the final pathologic stage based upon all  "pertinent information, including but potentially not limited to this pathology report.   Primary Tumor (pT)  pT1b    Regional Lymph Nodes (pN)  pN not assigned (no nodes submitted or found)    ADDITIONAL FINDINGS   Additional Findings in Nonneoplastic Kidney  None identified        Partial Penectomy 6/3/2022          TUMOR   Tumor Focality   Unifocal    Tumor Site   Glans    Tumor Macroscopic Features   Polypoid    Tumor Size   Greatest Dimension (Centimeters): 5.1 cm   Histologic Type   Squamous cell carcinoma, usual type    Histologic Grade   G1, well-differentiated    Tumor Thickness / Depth of Invasion   7.0 mm   Tumor Deep Borders   Pushing (broadly based)    Tumor Extent   Invades lamina propria        Invades dermis    Lymphovascular Invasion   Not identified    Perineural Invasion   Not identified    MARGINS   Margin Status for Invasive Carcinoma   All margins negative for invasive carcinoma    Closest Margin(s) to Invasive Carcinoma   Skin    Distance from Invasive Carcinoma to Closest Margin   9 mm   Margin Status for Noninvasive Carcinoma / Carcinoma in Situ   All margins negative for non-invasive carcinoma / carcinoma in situ               PATHOLOGIC STAGE CLASSIFICATION (pTNM, AJCC 8th Edition)   Reporting of pT, pN, and (when applicable) pM categories is based on information available to the pathologist at the time the report is issued. As per the AJCC (Chapter 1, 8th Ed.) it is the managing physician s responsibility to establish the final pathologic stage based upon all pertinent information, including but potentially not limited to this pathology report.   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  General Appearance Adult: Body mass index is 36.13 kg/m .  Alert, no acute distress, oriented  Lungs: no respiratory distress, or pursed lip breathing  Abdomen: soft, nontender, no organomegaly or masses  Back: no CVAT  Neuro: Alert, oriented, speech and mentation normal  Psych: affect and mood normal    ASSESSMENT and PLAN  67 year old male with penile cancer, ureteral stone, and left renal mass.     Penile Cancer - s/p partial penectomy 6/3/2022 with stage pT1a squamous cell carcinoma. No palpable nodes. Margins of resection negative. CT with no evidence of disease recurrence.     Renal Mass - Partial nephrectomy completed with stage pT1a papillary RCC with negative margins. Partial completed 11/25/2022. We reviewed pathology today and role for surveillance.    - Follow-up 3 months with CT and labs    Alcon Whittaker MD  Urology  [facility]

## 2022-12-15 NOTE — NURSING NOTE
Chief Complaint   Patient presents with     renal mass     Post op after procedure     Roberta Guo, CMA

## 2023-01-10 NOTE — NURSING NOTE
"Chief Complaint   Patient presents with     Study Results     Follow up insomnia        Initial /77  Pulse 72  Temp 98  F (36.7  C) (Oral)  Resp 16  Ht 1.778 m (5' 10\")  Wt 125.2 kg (276 lb)  SpO2 96%  BMI 39.6 kg/m2 Estimated body mass index is 39.6 kg/(m^2) as calculated from the following:    Height as of this encounter: 1.778 m (5' 10\").    Weight as of this encounter: 125.2 kg (276 lb).    Medication Reconciliation: complete    ESS 3    Barbi Baker MA    " Medications reviewed with Dr Santamaria at this time patient may stop Pepcid and Pantoprazole. Patient was informed per Dr Santamaria. Other refills authorized

## 2023-03-15 ENCOUNTER — ANCILLARY PROCEDURE (OUTPATIENT)
Dept: CT IMAGING | Facility: CLINIC | Age: 68
End: 2023-03-15
Attending: UROLOGY
Payer: COMMERCIAL

## 2023-03-15 ENCOUNTER — LAB (OUTPATIENT)
Dept: LAB | Facility: CLINIC | Age: 68
End: 2023-03-15
Payer: COMMERCIAL

## 2023-03-15 DIAGNOSIS — C64.2 RENAL CELL CARCINOMA, LEFT (H): ICD-10-CM

## 2023-03-15 DIAGNOSIS — C60.9 PENILE CANCER (H): ICD-10-CM

## 2023-03-15 DIAGNOSIS — R97.20 ELEVATED PROSTATE SPECIFIC ANTIGEN (PSA): ICD-10-CM

## 2023-03-15 LAB
ANION GAP SERPL CALCULATED.3IONS-SCNC: 12 MMOL/L (ref 7–15)
BUN SERPL-MCNC: 38.3 MG/DL (ref 8–23)
CALCIUM SERPL-MCNC: 9.7 MG/DL (ref 8.8–10.2)
CHLORIDE SERPL-SCNC: 106 MMOL/L (ref 98–107)
CREAT BLD-MCNC: 1.9 MG/DL (ref 0.7–1.3)
CREAT SERPL-MCNC: 1.64 MG/DL (ref 0.67–1.17)
DEPRECATED HCO3 PLAS-SCNC: 25 MMOL/L (ref 22–29)
ERYTHROCYTE [DISTWIDTH] IN BLOOD BY AUTOMATED COUNT: 13.2 % (ref 10–15)
GFR SERPL CREATININE-BSD FRML MDRD: 38 ML/MIN/1.73M2
GFR SERPL CREATININE-BSD FRML MDRD: 45 ML/MIN/1.73M2
GLUCOSE SERPL-MCNC: 167 MG/DL (ref 70–99)
HCT VFR BLD AUTO: 39.8 % (ref 40–53)
HGB BLD-MCNC: 13.4 G/DL (ref 13.3–17.7)
MCH RBC QN AUTO: 29 PG (ref 26.5–33)
MCHC RBC AUTO-ENTMCNC: 33.7 G/DL (ref 31.5–36.5)
MCV RBC AUTO: 86 FL (ref 78–100)
PLATELET # BLD AUTO: 186 10E3/UL (ref 150–450)
POTASSIUM SERPL-SCNC: 4.1 MMOL/L (ref 3.4–5.3)
PSA SERPL DL<=0.01 NG/ML-MCNC: 3.94 NG/ML (ref 0–4.5)
RBC # BLD AUTO: 4.62 10E6/UL (ref 4.4–5.9)
SODIUM SERPL-SCNC: 143 MMOL/L (ref 136–145)
WBC # BLD AUTO: 7.4 10E3/UL (ref 4–11)

## 2023-03-15 PROCEDURE — 82565 ASSAY OF CREATININE: CPT

## 2023-03-15 PROCEDURE — 36415 COLL VENOUS BLD VENIPUNCTURE: CPT

## 2023-03-15 PROCEDURE — 74177 CT ABD & PELVIS W/CONTRAST: CPT

## 2023-03-15 PROCEDURE — 255N000002 HC RX 255 OP 636: Performed by: UROLOGY

## 2023-03-15 PROCEDURE — 85027 COMPLETE CBC AUTOMATED: CPT

## 2023-03-15 PROCEDURE — 84153 ASSAY OF PSA TOTAL: CPT

## 2023-03-15 RX ADMIN — IOHEXOL 100 ML: 350 INJECTION, SOLUTION INTRAVENOUS at 10:14

## 2023-03-23 ENCOUNTER — OFFICE VISIT (OUTPATIENT)
Dept: UROLOGY | Facility: CLINIC | Age: 68
End: 2023-03-23
Payer: COMMERCIAL

## 2023-03-23 VITALS
OXYGEN SATURATION: 97 % | WEIGHT: 260 LBS | HEIGHT: 69 IN | SYSTOLIC BLOOD PRESSURE: 167 MMHG | DIASTOLIC BLOOD PRESSURE: 84 MMHG | BODY MASS INDEX: 38.51 KG/M2

## 2023-03-23 DIAGNOSIS — C60.9 PENILE CANCER (H): ICD-10-CM

## 2023-03-23 DIAGNOSIS — C64.2 RENAL CELL CARCINOMA, LEFT (H): Primary | ICD-10-CM

## 2023-03-23 DIAGNOSIS — R97.20 ELEVATED PROSTATE SPECIFIC ANTIGEN (PSA): ICD-10-CM

## 2023-03-23 PROCEDURE — 99214 OFFICE O/P EST MOD 30 MIN: CPT | Performed by: UROLOGY

## 2023-03-23 ASSESSMENT — PAIN SCALES - GENERAL: PAINLEVEL: NO PAIN (0)

## 2023-03-23 NOTE — PROGRESS NOTES
CHIEF COMPLAINT   It was my pleasure to see Brian Flynn who is a 67 year old male for follow-up of penile cancer and renal cell carcinoma.      HPI  Brian Flynn is a very pleasant 67 year old male who presents with a history of penile cancer. He noted burning with urination and palpable mass. Now s/p partial penectomy 6/3/2022. Demonstrated well-differentiated, stage pT1a. No palpable nodes or visible nodes on CT. Margins negative from resection. He overall is doing well and reports improvement in his voiding.      He also has history of left renal mass. Now s/p left robotic partial nephrectomy on 11/252022. 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 No recurrence noted on scan.     Of note, he also had prior right ureteral stone extracted via ureteroscopy.     Creat 1.64  HGB 13.4    CT abd/pelvis w contrast 3/15/2023  IMPRESSION:   1.  Partial nephrectomy on the left.  2.  A left periaortic retroperitoneal lymph node is larger compared to 09/19/2022. Recommend further attention to this at imaging surveillance. There are a few stable enlarged left external iliac chain lymph nodes.  3.  No new disease otherwise seen.     Left Robotic Partial Nephrectomy  11/25/2022       SPECIMEN   Procedure   Partial nephrectomy    Specimen Laterality   Left    TUMOR   Tumor Focality   Unifocal    Tumor Site   Not specified    Tumor Size   Greatest Dimension (Centimeters): 4.3 cm   Histologic Type   Papillary renal cell carcinoma, type 1    Histologic Grade (WHO / ISUP)   G2 (nucleoli conspicuous and eosinophilic at 400x magnification, visible but not prominent at 100x magnification)    Tumor Extent   Limited to kidney    Sarcomatoid Features   Not identified    Rhabdoid Features   Not identified    Tumor Necrosis   Present    MARGINS   Margin Status   All margins negative for invasive carcinoma    REGIONAL LYMPH NODES   Regional Lymph Node Status   Not applicable (no regional lymph  nodes submitted or found)    PATHOLOGIC STAGE CLASSIFICATION (pTNM, AJCC 8th Edition)   Reporting of pT, pN, and (when applicable) pM categories is based on information available to the pathologist at the time the report is issued. As per the AJCC (Chapter 1, 8th Ed.) it is the managing physician s responsibility to establish the final pathologic stage based upon all pertinent information, including but potentially not limited to this pathology report.   Primary Tumor (pT)   pT1b    Regional Lymph Nodes (pN)   pN not assigned (no nodes submitted or found)    ADDITIONAL FINDINGS   Additional Findings in Nonneoplastic Kidney   None identified        Partial Penectomy 6/3/2022          TUMOR   Tumor Focality   Unifocal    Tumor Site   Glans    Tumor Macroscopic Features   Polypoid    Tumor Size   Greatest Dimension (Centimeters): 5.1 cm   Histologic Type   Squamous cell carcinoma, usual type    Histologic Grade   G1, well-differentiated    Tumor Thickness / Depth of Invasion   7.0 mm   Tumor Deep Borders   Pushing (broadly based)    Tumor Extent   Invades lamina propria        Invades dermis    Lymphovascular Invasion   Not identified    Perineural Invasion   Not identified    MARGINS   Margin Status for Invasive Carcinoma   All margins negative for invasive carcinoma    Closest Margin(s) to Invasive Carcinoma   Skin    Distance from Invasive Carcinoma to Closest Margin   9 mm   Margin Status for Noninvasive Carcinoma / Carcinoma in Situ   All margins negative for non-invasive carcinoma / carcinoma in situ               PATHOLOGIC STAGE CLASSIFICATION (pTNM, AJCC 8th Edition)   Reporting of pT, pN, and (when applicable) pM categories is based on information available to the pathologist at the time the report is issued. As per the AJCC (Chapter 1, 8th Ed.) it is the managing physician s responsibility to establish the final pathologic stage based upon all pertinent information, including but potentially not limited to  "this pathology report.   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  General Appearance Adult: Body mass index is 38.4 kg/m .  Alert, no acute distress, oriented  Lungs: no respiratory distress, or pursed lip breathing  Abdomen: soft, nontender, no organomegaly or masses  Back: no CVAT  Neuro: Alert, oriented, speech and mentation normal  Psych: affect and mood normal    Outside and Past Medical records:  Review of the result(s) of each unique test - CT, creat    ASSESSMENT and PLAN  67 year old male with penile cancer, ureteral stone, and left renal mass.     Penile Cancer - s/p partial penectomy 6/3/2022 with stage pT1a squamous cell carcinoma. No palpable nodes. Margins of resection negative. CT with no evidence of disease recurrence.     Renal Mass - Partial nephrectomy completed with stage pT1a papillary RCC with negative margins. Partial completed 11/25/2022. No recurrence noted today.     - Follow-up 6 months with CT and BMP, PSA    20 minutes spent on the date of the encounter doing chart review, history and exam, documentation and further activities as noted above.    Alcon Whittaker MD  Urology  [facility]    "

## 2023-03-23 NOTE — NURSING NOTE
Chief Complaint   Patient presents with     renal cell carcinoma     Here in clinic today   talk with the DR about ct scan results and also blood work too   Twice a night to urinate   Good flow   Little dribble   No urgency to urinate time to time   Avtar Wood, clinic assistant  Told patient to call his primary doctor over his blood pressure.

## 2023-03-26 PROBLEM — N28.1 ACQUIRED RENAL CYST OF LEFT KIDNEY: Status: RESOLVED | Noted: 2022-06-29 | Resolved: 2023-03-26

## 2023-03-26 PROBLEM — N48.89 PENILE MASS: Status: RESOLVED | Noted: 2022-05-30 | Resolved: 2023-03-26

## 2023-03-26 PROBLEM — N20.0 NEPHROLITHIASIS: Status: RESOLVED | Noted: 2022-06-29 | Resolved: 2023-03-26

## 2023-03-26 PROBLEM — N20.1 RIGHT URETERAL STONE: Status: RESOLVED | Noted: 2022-09-22 | Resolved: 2023-03-26

## 2023-03-26 PROBLEM — N28.89 LEFT RENAL MASS: Status: RESOLVED | Noted: 2022-09-22 | Resolved: 2023-03-26

## 2023-04-02 ENCOUNTER — HEALTH MAINTENANCE LETTER (OUTPATIENT)
Age: 68
End: 2023-04-02

## 2023-04-03 ASSESSMENT — ENCOUNTER SYMPTOMS
JOINT SWELLING: 0
ARTHRALGIAS: 0
NAUSEA: 0
ABDOMINAL PAIN: 0
DYSURIA: 0
SORE THROAT: 0
HEARTBURN: 0
HEADACHES: 0
COUGH: 0
WEAKNESS: 0
CHILLS: 0
NERVOUS/ANXIOUS: 0
FREQUENCY: 0
PALPITATIONS: 0
DIZZINESS: 0
SHORTNESS OF BREATH: 0
HEMATOCHEZIA: 0
FEVER: 0
HEMATURIA: 0
DIARRHEA: 0
EYE PAIN: 0
PARESTHESIAS: 0
CONSTIPATION: 0
MYALGIAS: 0

## 2023-04-03 ASSESSMENT — ACTIVITIES OF DAILY LIVING (ADL): CURRENT_FUNCTION: NO ASSISTANCE NEEDED

## 2023-04-04 ENCOUNTER — OFFICE VISIT (OUTPATIENT)
Dept: FAMILY MEDICINE | Facility: CLINIC | Age: 68
End: 2023-04-04
Payer: COMMERCIAL

## 2023-04-04 VITALS
RESPIRATION RATE: 14 BRPM | SYSTOLIC BLOOD PRESSURE: 144 MMHG | DIASTOLIC BLOOD PRESSURE: 80 MMHG | TEMPERATURE: 99.2 F | HEART RATE: 71 BPM | WEIGHT: 266 LBS | OXYGEN SATURATION: 95 % | HEIGHT: 68 IN | BODY MASS INDEX: 40.32 KG/M2

## 2023-04-04 DIAGNOSIS — Z00.00 ENCOUNTER FOR ANNUAL WELLNESS EXAM IN MEDICARE PATIENT: ICD-10-CM

## 2023-04-04 DIAGNOSIS — Z00.00 ENCOUNTER FOR MEDICARE ANNUAL WELLNESS EXAM: ICD-10-CM

## 2023-04-04 DIAGNOSIS — C60.9 PENILE CANCER (H): ICD-10-CM

## 2023-04-04 DIAGNOSIS — E11.69 TYPE 2 DIABETES MELLITUS WITH OTHER SPECIFIED COMPLICATION, WITHOUT LONG-TERM CURRENT USE OF INSULIN (H): Primary | ICD-10-CM

## 2023-04-04 DIAGNOSIS — E66.01 MORBID OBESITY (H): ICD-10-CM

## 2023-04-04 DIAGNOSIS — E78.5 HYPERLIPIDEMIA LDL GOAL <100: ICD-10-CM

## 2023-04-04 DIAGNOSIS — I10 ESSENTIAL HYPERTENSION, BENIGN: ICD-10-CM

## 2023-04-04 DIAGNOSIS — C64.2 RENAL CELL CARCINOMA, LEFT (H): ICD-10-CM

## 2023-04-04 LAB
ALBUMIN SERPL BCG-MCNC: 4.3 G/DL (ref 3.5–5.2)
ALP SERPL-CCNC: 104 U/L (ref 40–129)
ALT SERPL W P-5'-P-CCNC: 15 U/L (ref 10–50)
ANION GAP SERPL CALCULATED.3IONS-SCNC: 12 MMOL/L (ref 7–15)
AST SERPL W P-5'-P-CCNC: 20 U/L (ref 10–50)
BILIRUB SERPL-MCNC: 1.1 MG/DL
BUN SERPL-MCNC: 39 MG/DL (ref 8–23)
CALCIUM SERPL-MCNC: 9.9 MG/DL (ref 8.8–10.2)
CHLORIDE SERPL-SCNC: 106 MMOL/L (ref 98–107)
CHOLEST SERPL-MCNC: 116 MG/DL
CREAT SERPL-MCNC: 1.75 MG/DL (ref 0.67–1.17)
CREAT UR-MCNC: 102 MG/DL
DEPRECATED HCO3 PLAS-SCNC: 26 MMOL/L (ref 22–29)
GFR SERPL CREATININE-BSD FRML MDRD: 42 ML/MIN/1.73M2
GLUCOSE SERPL-MCNC: 127 MG/DL (ref 70–99)
HBA1C MFR BLD: 5.9 % (ref 0–5.6)
HDLC SERPL-MCNC: 33 MG/DL
LDLC SERPL CALC-MCNC: 60 MG/DL
MICROALBUMIN UR-MCNC: <12 MG/L
MICROALBUMIN/CREAT UR: NORMAL MG/G{CREAT}
NONHDLC SERPL-MCNC: 83 MG/DL
POTASSIUM SERPL-SCNC: 4.6 MMOL/L (ref 3.4–5.3)
PROT SERPL-MCNC: 7.8 G/DL (ref 6.4–8.3)
SODIUM SERPL-SCNC: 144 MMOL/L (ref 136–145)
TRIGL SERPL-MCNC: 116 MG/DL

## 2023-04-04 PROCEDURE — 80061 LIPID PANEL: CPT | Performed by: FAMILY MEDICINE

## 2023-04-04 PROCEDURE — 82570 ASSAY OF URINE CREATININE: CPT | Performed by: FAMILY MEDICINE

## 2023-04-04 PROCEDURE — G0439 PPPS, SUBSEQ VISIT: HCPCS | Performed by: FAMILY MEDICINE

## 2023-04-04 PROCEDURE — 80053 COMPREHEN METABOLIC PANEL: CPT | Performed by: FAMILY MEDICINE

## 2023-04-04 PROCEDURE — 83036 HEMOGLOBIN GLYCOSYLATED A1C: CPT | Performed by: FAMILY MEDICINE

## 2023-04-04 PROCEDURE — 99214 OFFICE O/P EST MOD 30 MIN: CPT | Mod: 25 | Performed by: FAMILY MEDICINE

## 2023-04-04 PROCEDURE — 82043 UR ALBUMIN QUANTITATIVE: CPT | Performed by: FAMILY MEDICINE

## 2023-04-04 PROCEDURE — 36415 COLL VENOUS BLD VENIPUNCTURE: CPT | Performed by: FAMILY MEDICINE

## 2023-04-04 ASSESSMENT — ENCOUNTER SYMPTOMS
SORE THROAT: 0
COUGH: 0
PARESTHESIAS: 0
DIARRHEA: 0
NERVOUS/ANXIOUS: 0
FREQUENCY: 0
ARTHRALGIAS: 0
NAUSEA: 0
ABDOMINAL PAIN: 0
EYE PAIN: 0
SHORTNESS OF BREATH: 0
CHILLS: 0
DYSURIA: 0
HEARTBURN: 0
WEAKNESS: 0
HEMATOCHEZIA: 0
FEVER: 0
JOINT SWELLING: 0
HEMATURIA: 0
CONSTIPATION: 0
PALPITATIONS: 0
MYALGIAS: 0
DIZZINESS: 0
HEADACHES: 0

## 2023-04-04 ASSESSMENT — PAIN SCALES - GENERAL: PAINLEVEL: NO PAIN (0)

## 2023-04-04 ASSESSMENT — ACTIVITIES OF DAILY LIVING (ADL): CURRENT_FUNCTION: NO ASSISTANCE NEEDED

## 2023-04-04 NOTE — PATIENT INSTRUCTIONS
Patient Education   Personalized Prevention Plan  You are due for the preventive services outlined below.  Your care team is available to assist you in scheduling these services.  If you have already completed any of these items, please share that information with your care team to update in your medical record.  Health Maintenance Due   Topic Date Due     Eye Exam  02/03/2023     A1C Lab  02/15/2023     Cholesterol Lab  02/25/2023     Kidney Microalbumin Urine Test  02/25/2023       Understanding USDA MyPlate  The USDA has guidelines to help you make healthy food choices. These are called MyPlate. MyPlate shows the food groups that make up healthy meals using the image of a place setting. Before you eat, think about the healthiest choices for what to put on your plate or in your cup or bowl. To learn more about building a healthy plate, visit www.choosemyplate.gov.     The food groups    Fruits. Any fruit or 100% fruit juice counts as part of the Fruit Group. Fruits may be fresh, canned, frozen, or dried, and may be whole, cut-up, or pureed. Make 1/2 of your plate fruits and vegetables.    Vegetables. Any vegetable or 100% vegetable juice counts as a member of the Vegetable Group. Vegetables may be fresh, frozen, canned, or dried. They can be served raw or cooked and may be whole, cut-up, or mashed. Make 1/2 of your plate fruits and vegetables.    Grains. All foods made from grains are part of the Grains Group. These include wheat, rice, oats, cornmeal, and barley. Grains are often used to make foods such as bread, pasta, oatmeal, cereal, tortillas, and grits. Grains should be no more than 1/4 of your plate. At least half of your grains should be whole grains.    Protein. This group includes meat, poultry, seafood, beans and peas, eggs, processed soy products (such as tofu), nuts (including nut butters), and seeds. Make protein choices no more than 1/4 of your plate. Meat and poultry choices should be lean or low  fat.    Dairy. The Dairy Group includes all fluid milk products and foods made from milk that contain calcium, such as yogurt and cheese. (Foods that have little calcium, such as cream, butter, and cream cheese, are not part of this group.) Most dairy choices should be low-fat or fat-free.    Oils. Oils aren't a food group, but they do contain essential nutrients. However it's important to watch your intake of oils. These are fats that are liquid at room temperature. They include canola, corn, olive, soybean, vegetable, and sunflower oil. Foods that are mainly oil include mayonnaise, certain salad dressings, and soft margarines. You likely already get your daily oil allowance from the foods you eat.  Things to limit  Eating healthy also means limiting these things in your diet:    Salt (sodium). Many processed foods have a lot of sodium. To keep sodium intake down, eat fresh vegetables, meats, poultry, and seafood when possible. Purchase low-sodium, reduced-sodium, or no-salt-added food products at the store. And don't add salt to your meals at home. Instead, season them with herbs and spices such as dill, oregano, cumin, and paprika. Or try adding flavor with lemon or lime zest and juice.    Saturated fat. Saturated fats are most often found in animal products such as beef, pork, and chicken. They are often solid at room temperature, such as butter. To reduce your saturated fat intake, choose leaner cuts of meat and poultry. And try healthier cooking methods such as grilling, broiling, roasting, or baking. For a simple lower-fat swap, use plain nonfat yogurt instead of mayonnaise when making potato salad or macaroni salad.    Added sugars. These are sugars added to foods. They are in foods such as ice cream, candy, soda, fruit drinks, sports drinks, energy drinks, cookies, pastries, jams, and syrups. Cut down on added sugars by sharing sweet treats with a family member or friend. You can also choose fruit for  dessert, and drink water or other unsweetened beverages.  [employer] last reviewed this educational content on 6/1/2020 2000-2022 [employer]. All rights reserved. This information is not intended as a substitute for professional medical care. Always follow your healthcare professional's instructions.

## 2023-04-04 NOTE — PROGRESS NOTES
"SUBJECTIVE:   Jeremi is a 67 year old who presents for Preventive Visit.       View : No data to display.            Patient has been advised of split billing requirements and indicates understanding: Yes  Are you in the first 12 months of your Medicare coverage?  No    Healthy Habits:     In general, how would you rate your overall health?  Good    Frequency of exercise:  6-7 days/week    Duration of exercise:  45-60 minutes    Do you usually eat at least 4 servings of fruit and vegetables a day, include whole grains    & fiber and avoid regularly eating high fat or \"junk\" foods?  No    Taking medications regularly:  Yes    Medication side effects:  None    Ability to successfully perform activities of daily living:  No assistance needed    Home Safety:  No safety concerns identified    Hearing Impairment:  No hearing concerns    In the past 6 months, have you been bothered by leaking of urine?  No    In general, how would you rate your overall mental or emotional health?  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  Currently on 4 blood pressure medication.  Blood pressure is usually elevated while he is in the clinic.  Overall feeling healthy.  Somewhat tired on and off.  His creatinine has been slightly on the upper side.  Denies any chest pains no shortness of breath.  Complains of mild arthritic changes in bilateral joints.  No recent trauma or fall.  Have you ever done Advance Care Planning? (For example, a Health Directive, POLST, or a discussion with a medical provider or your loved ones about your wishes): Yes, advance care planning is on file.    Fall risk  Fallen 2 or more times in the past year?: No  Any fall with injury in the past year?: No  Cognitive Screening   1) Repeat 3 items (Leader, Season, Table)    2) Clock draw: NORMAL  3) 3 item recall: Recalls 2 objects   Results: NORMAL clock, 1-2 items recalled: " COGNITIVE IMPAIRMENT LESS LIKELY        Reviewed and updated as needed this visit by clinical staff   Tobacco  Allergies  Meds              Reviewed and updated as needed this visit by Provider                 Social History     Tobacco Use     Smoking status: Never     Smokeless tobacco: Never   Vaping Use     Vaping status: Not on file   Substance Use Topics     Alcohol use: No             4/3/2023     2:40 PM   Alcohol Use   Prescreen: >3 drinks/day or >7 drinks/week? Not Applicable           Current providers sharing in care for this patient include: Patient Care Team:  Kirill Beal MD as PCP - General (Family Practice)  Kirill Beal MD as Assigned PCP  Kirill Beal MD as Referring Physician (Family Medicine)  Alcon Whittaker MD as MD (Urology)  Alcon Whittaker MD as Assigned Cancer Care Provider    The following health maintenance items are reviewed in Epic and correct as of today:  Health Maintenance   Topic Date Due     MEDICARE ANNUAL WELLNESS VISIT  05/21/2022     EYE EXAM  02/03/2023     A1C  02/15/2023     LIPID  02/25/2023     MICROALBUMIN  02/25/2023     DIABETIC FOOT EXAM  06/01/2023     ANNUAL REVIEW OF HM ORDERS  06/01/2023     BMP  03/15/2024     ADVANCE CARE PLANNING  04/04/2028     COLORECTAL CANCER SCREENING  05/22/2028     DTAP/TDAP/TD IMMUNIZATION (2 - Td or Tdap) 12/11/2029     HEPATITIS C SCREENING  Completed     PHQ-2 (once per calendar year)  Completed     INFLUENZA VACCINE  Completed     Pneumococcal Vaccine: 65+ Years  Completed     ZOSTER IMMUNIZATION  Completed     AORTIC ANEURYSM SCREENING (SYSTEM ASSIGNED)  Completed     COVID-19 Vaccine  Completed     IPV IMMUNIZATION  Aged Out     MENINGITIS IMMUNIZATION  Aged Out     FALL RISK ASSESSMENT  Discontinued           Review of Systems   Constitutional: Negative for chills and fever.   HENT: Negative for congestion, ear pain, hearing loss and sore throat.    Eyes: Negative for pain and visual disturbance.  "  Respiratory: Negative for cough and shortness of breath.    Cardiovascular: Negative for chest pain, palpitations and peripheral edema.   Gastrointestinal: Negative for abdominal pain, constipation, diarrhea, heartburn, hematochezia and nausea.   Genitourinary: Negative for dysuria, frequency, genital sores, hematuria, impotence, penile discharge and urgency.   Musculoskeletal: Negative for arthralgias, joint swelling and myalgias.   Skin: Negative for rash.   Neurological: Negative for dizziness, weakness, headaches and paresthesias.   Psychiatric/Behavioral: Negative for mood changes. The patient is not nervous/anxious.          OBJECTIVE:   BP (!) 144/80   Pulse 71   Temp 99.2  F (37.3  C) (Tympanic)   Resp 14   Ht 1.735 m (5' 8.31\")   Wt 120.7 kg (266 lb)   SpO2 95%   BMI 40.08 kg/m   Estimated body mass index is 40.08 kg/m  as calculated from the following:    Height as of this encounter: 1.735 m (5' 8.31\").    Weight as of this encounter: 120.7 kg (266 lb).  Physical Exam  GENERAL: healthy, alert and no distress  EYES: Eyes grossly normal to inspection, PERRL and conjunctivae and sclerae normal  HENT: ear canals and TM's normal, nose and mouth without ulcers or lesions  NECK: no adenopathy, no asymmetry, masses, or scars and thyroid normal to palpation  RESP: lungs clear to auscultation - no rales, rhonchi or wheezes  CV: regular rate and rhythm, normal S1 S2, no S3 or S4, no murmur, click or rub, no peripheral edema and peripheral pulses strong  ABDOMEN: soft, nontender, no hepatosplenomegaly, no masses and bowel sounds normal  MS: no gross musculoskeletal defects noted, no edema  SKIN: no suspicious lesions or rashes  NEURO: Normal strength and tone, mentation intact and speech normal  PSYCH: mentation appears normal, affect normal/bright      ASSESSMENT / PLAN:   Brian was seen today for physical.    Diagnoses and all orders for this visit:    Type 2 diabetes mellitus with other specified " complication, without long-term current use of insulin (H)  -     Adult Eye  Referral; Future  -     HEMOGLOBIN A1C; Future  -     Lipid panel reflex to direct LDL Non-fasting; Future  -     Albumin Random Urine Quantitative with Creat Ratio; Future  -     Comprehensive metabolic panel (BMP + Alb, Alk Phos, ALT, AST, Total. Bili, TP); Future  -     HEMOGLOBIN A1C  -     Lipid panel reflex to direct LDL Non-fasting  -     Albumin Random Urine Quantitative with Creat Ratio  -     Comprehensive metabolic panel (BMP + Alb, Alk Phos, ALT, AST, Total. Bili, TP)    Essential hypertension, benign  -     Albumin Random Urine Quantitative with Creat Ratio; Future  -     Comprehensive metabolic panel (BMP + Alb, Alk Phos, ALT, AST, Total. Bili, TP); Future  -     Albumin Random Urine Quantitative with Creat Ratio  -     Comprehensive metabolic panel (BMP + Alb, Alk Phos, ALT, AST, Total. Bili, TP)  Pressure initially was elevated recheck was somewhat better he usually have a slight high blood pressure at office.  I would continue to encourage him to watch his diet eat healthy.  If you need any refill we will refill that.  His kidney functions are mildly elevated if the trend continue to be worsen we may have to have him see nephrology.  Hyperlipidemia LDL goal <100  -     Lipid panel reflex to direct LDL Non-fasting; Future  -     Comprehensive metabolic panel (BMP + Alb, Alk Phos, ALT, AST, Total. Bili, TP); Future  -     Lipid panel reflex to direct LDL Non-fasting  -     Comprehensive metabolic panel (BMP + Alb, Alk Phos, ALT, AST, Total. Bili, TP)    Morbid obesity (H)    Encounter for annual wellness exam in Medicare patient  Labs ordered once done we will follow-up on that.  Penile cancer (H)  Seen urology  Renal cell carcinoma, left (H)  Seen urology      Patient has been advised of split billing requirements and indicates understanding: Yes      COUNSELING:  Reviewed preventive health counseling, as reflected  "in patient instructions       Regular exercise       Healthy diet/nutrition      BMI:   Estimated body mass index is 40.08 kg/m  as calculated from the following:    Height as of this encounter: 1.735 m (5' 8.31\").    Weight as of this encounter: 120.7 kg (266 lb).         He reports that he has never smoked. He has never used smokeless tobacco.      Appropriate preventive services were discussed with this patient, including applicable screening as appropriate for cardiovascular disease, diabetes, osteopenia/osteoporosis, and glaucoma.  As appropriate for age/gender, discussed screening for colorectal cancer, prostate cancer, breast cancer, and cervical cancer. Checklist reviewing preventive services available has been given to the patient.    Reviewed patients plan of care and provided an AVS. The Basic Care Plan (routine screening as documented in Health Maintenance) for Brian meets the Care Plan requirement. This Care Plan has been established and reviewed with the Patient.          Kirill Beal MD  [facility]    Identified Health Risks:  \    The patient was counseled and encouraged to consider modifying their diet and eating habits. He was provided with information on recommended healthy diet options.  "

## 2023-04-12 ENCOUNTER — TRANSFERRED RECORDS (OUTPATIENT)
Dept: HEALTH INFORMATION MANAGEMENT | Facility: CLINIC | Age: 68
End: 2023-04-12
Payer: COMMERCIAL

## 2023-04-12 LAB — RETINOPATHY: NEGATIVE

## 2023-06-25 DIAGNOSIS — E11.69 TYPE 2 DIABETES MELLITUS WITH OTHER SPECIFIED COMPLICATION, WITHOUT LONG-TERM CURRENT USE OF INSULIN (H): ICD-10-CM

## 2023-06-25 DIAGNOSIS — I10 ESSENTIAL HYPERTENSION, BENIGN: ICD-10-CM

## 2023-06-26 RX ORDER — LISINOPRIL AND HYDROCHLOROTHIAZIDE 20; 25 MG/1; MG/1
1 TABLET ORAL DAILY
Qty: 90 TABLET | Refills: 0 | Status: SHIPPED | OUTPATIENT
Start: 2023-06-26 | End: 2023-09-29

## 2023-06-27 DIAGNOSIS — I10 ESSENTIAL HYPERTENSION, BENIGN: ICD-10-CM

## 2023-06-27 RX ORDER — METOPROLOL SUCCINATE 50 MG/1
TABLET, EXTENDED RELEASE ORAL
Qty: 90 TABLET | Refills: 0 | Status: SHIPPED | OUTPATIENT
Start: 2023-06-27 | End: 2023-10-04

## 2023-08-15 ENCOUNTER — PATIENT OUTREACH (OUTPATIENT)
Dept: ONCOLOGY | Facility: CLINIC | Age: 68
End: 2023-08-15
Payer: COMMERCIAL

## 2023-08-15 NOTE — PROGRESS NOTES
[facility]: Cancer Care                                                                                            Called Jeremi today to review the cancer treatment summary that was prepared and sent via [employer]. He states he did receive and review the information. We discussed the intent and purpose of the document. He has no questions or survivorship needs at this time but appreciated the information and phone call.         Vanita Sutton, RN, BSN, OCN  Cancer Survivorship Nurse Coordinator

## 2023-08-27 ENCOUNTER — HEALTH MAINTENANCE LETTER (OUTPATIENT)
Age: 68
End: 2023-08-27

## 2023-09-14 ENCOUNTER — HOSPITAL ENCOUNTER (OUTPATIENT)
Dept: CT IMAGING | Facility: CLINIC | Age: 68
Discharge: HOME | End: 2023-09-14
Attending: UROLOGY | Admitting: UROLOGY
Payer: COMMERCIAL

## 2023-09-14 DIAGNOSIS — C64.2 RENAL CELL CARCINOMA, LEFT (H): ICD-10-CM

## 2023-09-14 DIAGNOSIS — C60.9 PENILE CANCER (H): ICD-10-CM

## 2023-09-14 LAB
CREAT BLD-MCNC: 1.9 MG/DL (ref 0.7–1.3)
EGFRCR SERPLBLD CKD-EPI 2021: 38 ML/MIN/1.73M2

## 2023-09-14 PROCEDURE — 250N000011 HC RX IP 250 OP 636: Performed by: UROLOGY

## 2023-09-14 PROCEDURE — 74177 CT ABD & PELVIS W/CONTRAST: CPT

## 2023-09-14 PROCEDURE — 82565 ASSAY OF CREATININE: CPT

## 2023-09-14 PROCEDURE — 250N000009 HC RX 250: Performed by: UROLOGY

## 2023-09-14 RX ORDER — IOPAMIDOL 755 MG/ML
131 INJECTION, SOLUTION INTRAVASCULAR ONCE
Status: COMPLETED | OUTPATIENT
Start: 2023-09-14 | End: 2023-09-14

## 2023-09-14 RX ADMIN — IOPAMIDOL 100 ML: 755 INJECTION, SOLUTION INTRAVENOUS at 09:46

## 2023-09-14 RX ADMIN — SODIUM CHLORIDE 77 ML: 9 INJECTION, SOLUTION INTRAVENOUS at 09:46

## 2023-09-16 DIAGNOSIS — I10 ESSENTIAL HYPERTENSION, BENIGN: ICD-10-CM

## 2023-09-19 RX ORDER — AMLODIPINE BESYLATE 5 MG/1
TABLET ORAL
Qty: 90 TABLET | Refills: 1 | Status: SHIPPED | OUTPATIENT
Start: 2023-09-19 | End: 2024-03-12

## 2023-09-21 ENCOUNTER — OFFICE VISIT (OUTPATIENT)
Dept: UROLOGY | Facility: CLINIC | Age: 68
End: 2023-09-21
Payer: COMMERCIAL

## 2023-09-21 VITALS
SYSTOLIC BLOOD PRESSURE: 168 MMHG | DIASTOLIC BLOOD PRESSURE: 89 MMHG | HEART RATE: 79 BPM | BODY MASS INDEX: 39.99 KG/M2 | WEIGHT: 270 LBS | HEIGHT: 69 IN | OXYGEN SATURATION: 96 %

## 2023-09-21 DIAGNOSIS — R97.20 ELEVATED PROSTATE SPECIFIC ANTIGEN (PSA): Primary | ICD-10-CM

## 2023-09-21 DIAGNOSIS — C60.9 PENILE CANCER (H): ICD-10-CM

## 2023-09-21 DIAGNOSIS — C64.2 RENAL CELL CARCINOMA, LEFT (H): ICD-10-CM

## 2023-09-21 LAB — PSA SERPL-MCNC: 6.4 UG/L (ref 0–4)

## 2023-09-21 PROCEDURE — 84153 ASSAY OF PSA TOTAL: CPT | Performed by: UROLOGY

## 2023-09-21 PROCEDURE — 99214 OFFICE O/P EST MOD 30 MIN: CPT | Performed by: UROLOGY

## 2023-09-21 PROCEDURE — 36415 COLL VENOUS BLD VENIPUNCTURE: CPT | Performed by: UROLOGY

## 2023-09-21 ASSESSMENT — PAIN SCALES - GENERAL: PAINLEVEL: NO PAIN (0)

## 2023-09-21 NOTE — Clinical Note
9/21/2023       RE: Brian Flynn  [address]  [place] [address]     Dear Colleague,    Thank you for referring your patient, Brian Flynn, to the [facility] at [facility]. Please see a copy of my visit note below.      CHIEF COMPLAINT   It was my pleasure to see Brian Flynn who is a 68 year old male for follow-up of penile cancer and renal cell carcinoma.      HPI  Brian Flynn is a very pleasant 68 year old male who presents with a history of penile cancer. He noted burning with urination and palpable mass. Now s/p partial penectomy 6/3/2022. Demonstrated well-differentiated, stage pT1a. No palpable nodes or visible nodes on CT. Margins negative from resection. He overall is doing well and reports improvement in his voiding.      He also has history of left renal mass. Now s/p left robotic partial nephrectomy on 11/252022. 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 No recurrence noted on scan.     Of note, he also had prior right ureteral stone extracted via ureteroscopy in 2022.      Creat 1.9    PSA  9/21/2023  3/15/2023 - 3.94  5/26/2022 - 3.0  2/25/2022 - 4.09     CT abd/pelvis w contrast 9/14/2023  IMPRESSION:   1.  Status post left partial nephrectomy without evidence of local  recurrence. Decreased superoposterior ill-defined fluid.  2.  Similar scattered subcentimeter retroperitoneal and borderline  enlarged left external iliac chain lymph nodes. No new sites of  disease.     Left Robotic Partial Nephrectomy  11/25/2022          SPECIMEN   Procedure   Partial nephrectomy    Specimen Laterality   Left    TUMOR   Tumor Focality   Unifocal    Tumor Site   Not specified    Tumor Size   Greatest Dimension (Centimeters): 4.3 cm   Histologic Type   Papillary renal cell carcinoma, type 1    Histologic Grade (WHO / ISUP)   G2 (nucleoli conspicuous and eosinophilic at 400x  magnification, visible but not prominent at 100x magnification)    Tumor Extent   Limited to kidney    Sarcomatoid Features   Not identified    Rhabdoid Features   Not identified    Tumor Necrosis   Present    MARGINS   Margin Status   All margins negative for invasive carcinoma    REGIONAL LYMPH NODES   Regional Lymph Node Status   Not applicable (no regional lymph nodes submitted or found)    PATHOLOGIC STAGE CLASSIFICATION (pTNM, AJCC 8th Edition)   Reporting of pT, pN, and (when applicable) pM categories is based on information available to the pathologist at the time the report is issued. As per the AJCC (Chapter 1, 8th Ed.) it is the managing physician s responsibility to establish the final pathologic stage based upon all pertinent information, including but potentially not limited to this pathology report.   Primary Tumor (pT)   pT1b    Regional Lymph Nodes (pN)   pN not assigned (no nodes submitted or found)    ADDITIONAL FINDINGS   Additional Findings in Nonneoplastic Kidney   None identified        Partial Penectomy 6/3/2022          TUMOR   Tumor Focality   Unifocal    Tumor Site   Glans    Tumor Macroscopic Features   Polypoid    Tumor Size   Greatest Dimension (Centimeters): 5.1 cm   Histologic Type   Squamous cell carcinoma, usual type    Histologic Grade   G1, well-differentiated    Tumor Thickness / Depth of Invasion   7.0 mm   Tumor Deep Borders   Pushing (broadly based)    Tumor Extent   Invades lamina propria        Invades dermis    Lymphovascular Invasion   Not identified    Perineural Invasion   Not identified    MARGINS   Margin Status for Invasive Carcinoma   All margins negative for invasive carcinoma    Closest Margin(s) to Invasive Carcinoma   Skin    Distance from Invasive Carcinoma to Closest Margin   9 mm   Margin Status for Noninvasive Carcinoma / Carcinoma in Situ   All margins negative for non-invasive carcinoma / carcinoma in situ               PATHOLOGIC STAGE CLASSIFICATION  "(pTNM, AJCC 8th Edition)   Reporting of pT, pN, and (when applicable) pM categories is based on information available to the pathologist at the time the report is issued. As per the AJCC (Chapter 1, 8th Ed.) it is the managing physician s responsibility to establish the final pathologic stage based upon all pertinent information, including but potentially not limited to this pathology report.   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  General Appearance Adult: Body mass index is 39.87 kg/m .  Alert, no acute distress, oriented  Lungs: no respiratory distress, or pursed lip breathing  Abdomen: soft, nontender, no organomegaly or masses; ***  Back: *** CVAT  Neuro: Alert, oriented, speech and mentation normal  Psych: affect and mood normal  : {RAFA EXAM MALE GENITAL:736131}    Outside and Past Medical records:  Review of the result(s) of each unique test - CT, creat     ASSESSMENT and PLAN  68 year old male with penile cancer, ureteral stone, and left renal mass.     Penile Cancer - s/p partial penectomy 6/3/2022 with stage pT1a squamous cell carcinoma. No palpable nodes. Margins of resection negative. CT with no evidence of disease recurrence.     Renal Mass - Partial nephrectomy completed with stage pT1a papillary RCC with negative margins. Partial completed 11/25/2022. No recurrence noted today.     - Follow-up 6 months with CT and BMP, PSA    *** minutes spent on the date of the encounter doing chart review, history and exam, documentation and further activities as noted above.    Alcon Whittaker MD  Urology  [facility]        CHIEF COMPLAINT   It was my pleasure to " see Brian Flynn who is a 68 year old male for follow-up of penile cancer and renal cell carcinoma.      HPI  Brian Flynn is a very pleasant 68 year old male who presents with a history of penile cancer. He noted burning with urination and palpable mass. Now s/p partial penectomy 6/3/2022. Demonstrated well-differentiated, stage pT1a. No palpable nodes or visible nodes on CT. Margins negative from resection. He overall is doing well and reports improvement in his voiding.      He also has history of left renal mass. Now s/p left robotic partial nephrectomy on 11/252022. 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 No recurrence noted on scan.     Of note, he also had prior right ureteral stone extracted via ureteroscopy in 2022.      Creat 1.9    PSA  9/21/2023 - 6.4  3/15/2023 - 3.94  5/26/2022 - 3.0  2/25/2022 - 4.09     CT abd/pelvis w contrast 9/14/2023  IMPRESSION:   1.  Status post left partial nephrectomy without evidence of local  recurrence. Decreased superoposterior ill-defined fluid.  2.  Similar scattered subcentimeter retroperitoneal and borderline  enlarged left external iliac chain lymph nodes. No new sites of  disease.     Left Robotic Partial Nephrectomy  11/25/2022          SPECIMEN   Procedure   Partial nephrectomy    Specimen Laterality   Left    TUMOR   Tumor Focality   Unifocal    Tumor Site   Not specified    Tumor Size   Greatest Dimension (Centimeters): 4.3 cm   Histologic Type   Papillary renal cell carcinoma, type 1    Histologic Grade (WHO / ISUP)   G2 (nucleoli conspicuous and eosinophilic at 400x magnification, visible but not prominent at 100x magnification)    Tumor Extent   Limited to kidney    Sarcomatoid Features   Not identified    Rhabdoid Features   Not identified    Tumor Necrosis   Present    MARGINS   Margin Status   All margins negative for invasive carcinoma    REGIONAL LYMPH NODES   Regional Lymph Node Status   Not applicable (no  regional lymph nodes submitted or found)    PATHOLOGIC STAGE CLASSIFICATION (pTNM, AJCC 8th Edition)   Reporting of pT, pN, and (when applicable) pM categories is based on information available to the pathologist at the time the report is issued. As per the AJCC (Chapter 1, 8th Ed.) it is the managing physician s responsibility to establish the final pathologic stage based upon all pertinent information, including but potentially not limited to this pathology report.   Primary Tumor (pT)   pT1b    Regional Lymph Nodes (pN)   pN not assigned (no nodes submitted or found)    ADDITIONAL FINDINGS   Additional Findings in Nonneoplastic Kidney   None identified        Partial Penectomy 6/3/2022          TUMOR   Tumor Focality   Unifocal    Tumor Site   Glans    Tumor Macroscopic Features   Polypoid    Tumor Size   Greatest Dimension (Centimeters): 5.1 cm   Histologic Type   Squamous cell carcinoma, usual type    Histologic Grade   G1, well-differentiated    Tumor Thickness / Depth of Invasion   7.0 mm   Tumor Deep Borders   Pushing (broadly based)    Tumor Extent   Invades lamina propria        Invades dermis    Lymphovascular Invasion   Not identified    Perineural Invasion   Not identified    MARGINS   Margin Status for Invasive Carcinoma   All margins negative for invasive carcinoma    Closest Margin(s) to Invasive Carcinoma   Skin    Distance from Invasive Carcinoma to Closest Margin   9 mm   Margin Status for Noninvasive Carcinoma / Carcinoma in Situ   All margins negative for non-invasive carcinoma / carcinoma in situ               PATHOLOGIC STAGE CLASSIFICATION (pTNM, AJCC 8th Edition)   Reporting of pT, pN, and (when applicable) pM categories is based on information available to the pathologist at the time the report is issued. As per the AJCC (Chapter 1, 8th Ed.) it is the managing physician s responsibility to establish the final pathologic stage based upon all pertinent information, including but potentially  "not limited to this pathology report.   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  General Appearance Adult: Body mass index is 39.87 kg/m .  Alert, no acute distress, oriented  Lungs: no respiratory distress, or pursed lip breathing  Abdomen: soft, nontender, no organomegaly or masses; ***  Back: *** CVAT  Neuro: Alert, oriented, speech and mentation normal  Psych: affect and mood normal  : {RAFA EXAM MALE GENITAL:021076}    Outside and Past Medical records:  Review of the result(s) of each unique test - CT, creat     ASSESSMENT and PLAN  68 year old male with penile cancer, ureteral stone, and left renal mass.     Penile Cancer - s/p partial penectomy 6/3/2022 with stage pT1a squamous cell carcinoma. No palpable nodes. Margins of resection negative. CT with no evidence of disease recurrence.     Renal Mass - Partial nephrectomy completed with stage pT1a papillary RCC with negative margins. Partial completed 11/25/2022. No recurrence noted today.     - Follow-up 6 months with CT and BMP, PSA    *** minutes spent on the date of the encounter doing chart review, history and exam, documentation and further activities as noted above.    Alcon Whittaker MD  Urology  [facility]        Again, thank you for allowing me to participate in the care of your patient.      Sincerely,    Alcon Whittaker MD    "

## 2023-09-21 NOTE — PROGRESS NOTES
CHIEF COMPLAINT   It was my pleasure to see Brian Flynn who is a 68 year old male for follow-up of penile cancer and renal cell carcinoma.      HPI  Brian Flynn is a very pleasant 68 year old male who presents with a history of penile cancer. He noted burning with urination and palpable mass. Now s/p partial penectomy 6/3/2022. Demonstrated well-differentiated, stage pT1a. No palpable nodes or visible nodes on CT. Margins negative from resection. He overall is doing well and reports improvement in his voiding.      He also has history of left renal mass. Now s/p left robotic partial nephrectomy on 11/252022. 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 No recurrence noted on scan.     Of note, he also had prior right ureteral stone extracted via ureteroscopy in 2022.      Creat 1.9    PSA  9/21/2023 - 6.4  3/15/2023 - 3.94  5/26/2022 - 3.0  2/25/2022 - 4.09     CT abd/pelvis w contrast 9/14/2023  IMPRESSION:   1.  Status post left partial nephrectomy without evidence of local  recurrence. Decreased superoposterior ill-defined fluid.  2.  Similar scattered subcentimeter retroperitoneal and borderline  enlarged left external iliac chain lymph nodes. No new sites of  disease.     Left Robotic Partial Nephrectomy  11/25/2022          SPECIMEN   Procedure   Partial nephrectomy    Specimen Laterality   Left    TUMOR   Tumor Focality   Unifocal    Tumor Site   Not specified    Tumor Size   Greatest Dimension (Centimeters): 4.3 cm   Histologic Type   Papillary renal cell carcinoma, type 1    Histologic Grade (WHO / ISUP)   G2 (nucleoli conspicuous and eosinophilic at 400x magnification, visible but not prominent at 100x magnification)    Tumor Extent   Limited to kidney    Sarcomatoid Features   Not identified    Rhabdoid Features   Not identified    Tumor Necrosis   Present    MARGINS   Margin Status   All margins negative for invasive carcinoma    REGIONAL LYMPH NODES   Regional  Lymph Node Status   Not applicable (no regional lymph nodes submitted or found)    PATHOLOGIC STAGE CLASSIFICATION (pTNM, AJCC 8th Edition)   Reporting of pT, pN, and (when applicable) pM categories is based on information available to the pathologist at the time the report is issued. As per the AJCC (Chapter 1, 8th Ed.) it is the managing physician s responsibility to establish the final pathologic stage based upon all pertinent information, including but potentially not limited to this pathology report.   Primary Tumor (pT)   pT1b    Regional Lymph Nodes (pN)   pN not assigned (no nodes submitted or found)    ADDITIONAL FINDINGS   Additional Findings in Nonneoplastic Kidney   None identified        Partial Penectomy 6/3/2022          TUMOR   Tumor Focality   Unifocal    Tumor Site   Glans    Tumor Macroscopic Features   Polypoid    Tumor Size   Greatest Dimension (Centimeters): 5.1 cm   Histologic Type   Squamous cell carcinoma, usual type    Histologic Grade   G1, well-differentiated    Tumor Thickness / Depth of Invasion   7.0 mm   Tumor Deep Borders   Pushing (broadly based)    Tumor Extent   Invades lamina propria        Invades dermis    Lymphovascular Invasion   Not identified    Perineural Invasion   Not identified    MARGINS   Margin Status for Invasive Carcinoma   All margins negative for invasive carcinoma    Closest Margin(s) to Invasive Carcinoma   Skin    Distance from Invasive Carcinoma to Closest Margin   9 mm   Margin Status for Noninvasive Carcinoma / Carcinoma in Situ   All margins negative for non-invasive carcinoma / carcinoma in situ               PATHOLOGIC STAGE CLASSIFICATION (pTNM, AJCC 8th Edition)   Reporting of pT, pN, and (when applicable) pM categories is based on information available to the pathologist at the time the report is issued. As per the AJCC (Chapter 1, 8th Ed.) it is the managing physician s responsibility to establish the final pathologic stage based upon all pertinent  "information, including but potentially not limited to this pathology report.   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  General Appearance Adult: Body mass index is 39.87 kg/m .  Alert, no acute distress, oriented  Lungs: no respiratory distress, or pursed lip breathing  Abdomen: soft, nontender, no organomegaly or masses  Back: no CVAT  Neuro: Alert, oriented, speech and mentation normal  Psych: affect and mood normal  : no recurrent suspicious lesions at partial penectomy site    Outside and Past Medical records:  Review of the result(s) of each unique test - CT, creat     ASSESSMENT and PLAN  68 year old male with penile cancer, ureteral stone, and left renal mass.     Penile Cancer - s/p partial penectomy 6/3/2022 with stage pT1a squamous cell carcinoma. No palpable nodes. Margins of resection negative. CT with no evidence of disease recurrence.     Renal Mass - Partial nephrectomy completed with stage pT1a papillary RCC with negative margins. Partial completed 11/25/2022. No recurrence noted today.    Elevated PSA - PSA up a bit to 6.4 today. We reviewed possibility of prostate cancer and other benign causes. At this point, will plan PSA recheck in 3 months and will call with results.    - PSA and BMP in 3 months - will call with results   - Follow-up 6 months with CT and labs    30 minutes spent on the date of the encounter doing chart review, history and exam, documentation and further activities as noted above.    Alcon Whittaker MD  Urology  [facility]    "

## 2023-09-21 NOTE — NURSING NOTE
Chief Complaint   Patient presents with    Follow Up     PATIENT HAD A PSA DRAW TODAY IN CLINIC     Told patient to call primary doctor today about blood pressure   Patient says doctor office pressures goes up .  Talk about the psa today   Also 9- ct done   And creatinine and gfr too  Urinating a lot in daytime ,  1-2 times to urinate in pm    Flow a little slow   Urgency to urinate too  Avtar Wood, clinic assistant

## 2023-09-29 DIAGNOSIS — I10 ESSENTIAL HYPERTENSION, BENIGN: ICD-10-CM

## 2023-09-29 DIAGNOSIS — E11.69 TYPE 2 DIABETES MELLITUS WITH OTHER SPECIFIED COMPLICATION, WITHOUT LONG-TERM CURRENT USE OF INSULIN (H): ICD-10-CM

## 2023-09-29 RX ORDER — LISINOPRIL AND HYDROCHLOROTHIAZIDE 20; 25 MG/1; MG/1
1 TABLET ORAL DAILY
Qty: 90 TABLET | Refills: 0 | Status: SHIPPED | OUTPATIENT
Start: 2023-09-29 | End: 2023-12-27

## 2023-10-04 ENCOUNTER — OFFICE VISIT (OUTPATIENT)
Dept: FAMILY MEDICINE | Facility: CLINIC | Age: 68
End: 2023-10-04
Payer: COMMERCIAL

## 2023-10-04 ENCOUNTER — TELEPHONE (OUTPATIENT)
Dept: NEPHROLOGY | Facility: CLINIC | Age: 68
End: 2023-10-04

## 2023-10-04 VITALS
SYSTOLIC BLOOD PRESSURE: 146 MMHG | TEMPERATURE: 95.8 F | BODY MASS INDEX: 40.76 KG/M2 | DIASTOLIC BLOOD PRESSURE: 80 MMHG | HEART RATE: 77 BPM | WEIGHT: 275.2 LBS | HEIGHT: 69 IN | RESPIRATION RATE: 18 BRPM | OXYGEN SATURATION: 95 %

## 2023-10-04 DIAGNOSIS — E11.69 TYPE 2 DIABETES MELLITUS WITH OTHER SPECIFIED COMPLICATION, WITHOUT LONG-TERM CURRENT USE OF INSULIN (H): ICD-10-CM

## 2023-10-04 DIAGNOSIS — E66.01 MORBID OBESITY (H): Primary | ICD-10-CM

## 2023-10-04 DIAGNOSIS — I10 ESSENTIAL HYPERTENSION, BENIGN: ICD-10-CM

## 2023-10-04 DIAGNOSIS — N18.32 STAGE 3B CHRONIC KIDNEY DISEASE (H): ICD-10-CM

## 2023-10-04 DIAGNOSIS — E78.5 HYPERLIPIDEMIA LDL GOAL <100: ICD-10-CM

## 2023-10-04 LAB
ALBUMIN SERPL BCG-MCNC: 4.1 G/DL (ref 3.5–5.2)
ALP SERPL-CCNC: 115 U/L (ref 40–129)
ALT SERPL W P-5'-P-CCNC: 16 U/L (ref 0–70)
ANION GAP SERPL CALCULATED.3IONS-SCNC: 10 MMOL/L (ref 7–15)
AST SERPL W P-5'-P-CCNC: 19 U/L (ref 0–45)
BILIRUB SERPL-MCNC: 1.1 MG/DL
BUN SERPL-MCNC: 33.3 MG/DL (ref 8–23)
CALCIUM SERPL-MCNC: 9.4 MG/DL (ref 8.8–10.2)
CHLORIDE SERPL-SCNC: 107 MMOL/L (ref 98–107)
CREAT SERPL-MCNC: 1.75 MG/DL (ref 0.67–1.17)
CREAT UR-MCNC: 114 MG/DL
DEPRECATED HCO3 PLAS-SCNC: 27 MMOL/L (ref 22–29)
EGFRCR SERPLBLD CKD-EPI 2021: 42 ML/MIN/1.73M2
GLUCOSE SERPL-MCNC: 137 MG/DL (ref 70–99)
HBA1C MFR BLD: 6.1 % (ref 0–5.6)
MICROALBUMIN UR-MCNC: 13.1 MG/L
MICROALBUMIN/CREAT UR: 11.49 MG/G CR (ref 0–17)
POTASSIUM SERPL-SCNC: 5.2 MMOL/L (ref 3.4–5.3)
PROT SERPL-MCNC: 7.3 G/DL (ref 6.4–8.3)
SODIUM SERPL-SCNC: 144 MMOL/L (ref 135–145)

## 2023-10-04 PROCEDURE — 80053 COMPREHEN METABOLIC PANEL: CPT | Performed by: FAMILY MEDICINE

## 2023-10-04 PROCEDURE — 82570 ASSAY OF URINE CREATININE: CPT | Performed by: FAMILY MEDICINE

## 2023-10-04 PROCEDURE — 82043 UR ALBUMIN QUANTITATIVE: CPT | Performed by: FAMILY MEDICINE

## 2023-10-04 PROCEDURE — 99214 OFFICE O/P EST MOD 30 MIN: CPT | Performed by: FAMILY MEDICINE

## 2023-10-04 PROCEDURE — 36415 COLL VENOUS BLD VENIPUNCTURE: CPT | Performed by: FAMILY MEDICINE

## 2023-10-04 PROCEDURE — 83036 HEMOGLOBIN GLYCOSYLATED A1C: CPT | Performed by: FAMILY MEDICINE

## 2023-10-04 RX ORDER — METOPROLOL SUCCINATE 50 MG/1
75 TABLET, EXTENDED RELEASE ORAL DAILY
Qty: 135 TABLET | Refills: 1 | Status: SHIPPED | OUTPATIENT
Start: 2023-10-04 | End: 2024-04-11

## 2023-10-04 RX ORDER — BRIMONIDINE TARTRATE AND TIMOLOL MALEATE 2; 5 MG/ML; MG/ML
SOLUTION OPHTHALMIC
COMMUNITY
Start: 2023-04-13

## 2023-10-04 RX ORDER — BRIMONIDINE TARTRATE 2 MG/ML
SOLUTION/ DROPS OPHTHALMIC
COMMUNITY
Start: 2023-09-30

## 2023-10-04 ASSESSMENT — PAIN SCALES - GENERAL: PAINLEVEL: NO PAIN (0)

## 2023-10-04 NOTE — TELEPHONE ENCOUNTER
[facility] Call Center    Phone Message    May a detailed message be left on voicemail: yes     Reason for Call: Other: Please add lab orders for 2/13 appointment with Dr. Corley. Thank you!     NOTE: Patient states that he needs to be seen before his 3 month follow-up with referring provider. I scheduled first available and added him to the wait list. If there is any way he can be scheduled sooner, he would greatly appreciate it.     Action Taken: Other: CS Neph    Travel Screening: Not Applicable

## 2023-10-04 NOTE — LETTER
October 10, 2023      Jeremi Flynn  [id number]  [facility] [address]        Dear ,    We are writing to inform you of your test results.    -A1C (test of diabetes control the last 2-3 months) is at your goal. Please continue with your current plan. Also, you should make an appointment to see me and recheck your A1C test in 6 months.   -Microalbumin (urine protein) test is normal.  ADVISE: rechecking this annually.   -Kidney functions are elevated but stable and not worsening.  As discussed wynn your metoprolol to 1-1/2 tablet(75 mg) and follow-up in 2 to 3 months for recheck.     Resulted Orders   HEMOGLOBIN A1C   Result Value Ref Range    Hemoglobin A1C 6.1 (H) 0.0 - 5.6 %      Comment:      Normal <5.7%   Prediabetes 5.7-6.4%    Diabetes 6.5% or higher     Note: Adopted from ADA consensus guidelines.   Comprehensive metabolic panel (BMP + Alb, Alk Phos, ALT, AST, Total. Bili, TP)   Result Value Ref Range    Sodium 144 135 - 145 mmol/L      Comment:      Reference intervals for this test were updated on 09/26/2023 to more accurately reflect our healthy population. There may be differences in the flagging of prior results with similar values performed with this method. Interpretation of those prior results can be made in the context of the updated reference intervals.     Potassium 5.2 3.4 - 5.3 mmol/L    Carbon Dioxide (CO2) 27 22 - 29 mmol/L    Anion Gap 10 7 - 15 mmol/L    Urea Nitrogen 33.3 (H) 8.0 - 23.0 mg/dL    Creatinine 1.75 (H) 0.67 - 1.17 mg/dL    GFR Estimate 42 (L) >60 mL/min/1.73m2    Calcium 9.4 8.8 - 10.2 mg/dL    Chloride 107 98 - 107 mmol/L    Glucose 137 (H) 70 - 99 mg/dL    Alkaline Phosphatase 115 40 - 129 U/L    AST 19 0 - 45 U/L      Comment:      Reference intervals for this test were updated on 6/12/2023 to more accurately reflect our healthy population. There may be differences in the flagging of prior results with similar values performed with this method.  Interpretation of those prior results can be made in the context of the updated reference intervals.    ALT 16 0 - 70 U/L      Comment:      Reference intervals for this test were updated on 6/12/2023 to more accurately reflect our healthy population. There may be differences in the flagging of prior results with similar values performed with this method. Interpretation of those prior results can be made in the context of the updated reference intervals.      Protein Total 7.3 6.4 - 8.3 g/dL    Albumin 4.1 3.5 - 5.2 g/dL    Bilirubin Total 1.1 <=1.2 mg/dL   Albumin Random Urine Quantitative with Creat Ratio   Result Value Ref Range    Creatinine Urine mg/dL 114.0 mg/dL      Comment:      The reference ranges have not been established in urine creatinine. The results should be integrated into the clinical context for interpretation.    Albumin Urine mg/L 13.1 mg/L      Comment:      The reference ranges have not been established in urine albumin. The results should be integrated into the clinical context for interpretation.    Albumin Urine mg/g Cr 11.49 0.00 - 17.00 mg/g Cr      Comment:      Microalbuminuria is defined as an albumin:creatinine ratio of 17 to 299 for males and 25 to 299 for females. A ratio of albumin:creatinine of 300 or higher is indicative of overt proteinuria.  Due to biologic variability, positive results should be confirmed by a second, first-morning random or 24-hour timed urine specimen. If there is discrepancy, a third specimen is recommended. When 2 out of 3 results are in the microalbuminuria range, this is evidence for incipient nephropathy and warrants increased efforts at glucose control, blood pressure control, and institution of therapy with an angiotensin-converting-enzyme (ACE) inhibitor (if the patient can tolerate it).         If you have any questions or concerns, please call the clinic at the number listed above.       Sincerely,      Kirill Beal MD

## 2023-10-04 NOTE — PROGRESS NOTES
Assessment & Plan     Morbid obesity (H)      Hyperlipidemia LDL goal <100      Type 2 diabetes mellitus with other specified complication, without long-term current use of insulin (H)  Patient hemoglobin A1c is 6.1.  His creatinine has been worsening.  With partial nephrectomy this could be the reason I suggested we should hold metformin for now and recheck labs again in 3 months.  Need to watch his diet what he is eating currently he should be eating less carbs.  He is currently after stopping medication would be on diet controlled.  - HEMOGLOBIN A1C; Future  - HEMOGLOBIN A1C  - Hemoglobin A1c; Future  - Comprehensive metabolic panel (BMP + Alb, Alk Phos, ALT, AST, Total. Bili, TP); Future    Essential hypertension, benign  Pressure has been an issue with this patient it has been elevated recheck was somewhat better.  Amlodipine sometimes cause ankle swelling however I suggested we should not stop that continue amlodipine lisinopril hydrochlorothiazide for CHD protection we will increase the dose of metoprolol to 1 and half tablet.  - Albumin Random Urine Quantitative with Creat Ratio; Future  - Albumin Random Urine Quantitative with Creat Ratio  - metoprolol succinate ER (TOPROL XL) 50 MG 24 hr tablet; Take 1.5 tablets (75 mg) by mouth daily    Stage 3b chronic kidney disease (H)  Creatinine has been progressively getting worse.  He needs to be evaluated by nephrology to see what other treatment options and adjustment in medication needed.  Referral provided.  - Comprehensive metabolic panel (BMP + Alb, Alk Phos, ALT, AST, Total. Bili, TP); Future  - Albumin Random Urine Quantitative with Creat Ratio; Future  - Comprehensive metabolic panel (BMP + Alb, Alk Phos, ALT, AST, Total. Bili, TP)  - Albumin Random Urine Quantitative with Creat Ratio  - Adult Nephrology  Referral; Future    56}     BMI:   Estimated body mass index is 40.64 kg/m  as calculated from the following:    Height as of this encounter:  "1.753 m (5' 9\").    Weight as of this encounter: 124.8 kg (275 lb 3.2 oz).       Kirill Beal MD  [facility] AMY Blood is a 68 year old, presenting for the following health issues:  Follow Up  Patient came today for follow-up on his chronic medical condition including hypertension CKD diabetes.  He has been diagnosed recently with penile cancer and some lesion on the kidney.  He has partial nephrectomy done.  His creatinine has slowly worsening over time.  Last hemoglobin A1c was stable currently on metformin.  For blood pressure he is taking lisinopril/hydrochlorothiazide plus amlodipine plus metoprolol.  Does have whitecoat hypertension which has been an issue.  He thinks his blood pressure is better at home.  Denies any chest pains no shortness of breath.  No fatigue tiredness.      10/4/2023     8:36 AM   Additional Questions   Roomed by Asad   Accompanied by Not applicable, by themselves       History of Present Illness       Diabetes:   He presents for follow up of diabetes.  He is checking home blood glucose a few times a month.   He checks blood glucose before meals.  Blood glucose is never over 200 and never under 70. He is aware of hypoglycemia symptoms including none.    He has no concerns regarding his diabetes at this time.  He is having numbness in feet and weight gain.            Hypertension: He presents for follow up of hypertension.  He does check blood pressure  regularly outside of the clinic. Outside blood pressures have been over 140/90. He does not follow a low salt diet.     He eats 0-1 servings of fruits and vegetables daily.He consumes 0 sweetened beverage(s) daily.He exercises with enough effort to increase his heart rate 20 to 29 minutes per day.  He exercises with enough effort to increase his heart rate 4 days per week.   He is taking medications regularly.       Review of Systems   Constitutional, HEENT, cardiovascular, pulmonary, gi and gu " "systems are negative, except as otherwise noted.      Objective    BP (!) 169/77 (BP Location: Right arm, Patient Position: Sitting, Cuff Size: Adult Large)   Pulse 77   Temp (!) 95.8  F (35.4  C) (Temporal)   Resp 18   Ht 1.753 m (5' 9\")   Wt 124.8 kg (275 lb 3.2 oz)   SpO2 95%   BMI 40.64 kg/m    Body mass index is 40.64 kg/m .  Physical Exam   GENERAL: healthy, alert and no distress  NECK: no adenopathy, no asymmetry, masses, or scars and thyroid normal to palpation  RESP: lungs clear to auscultation - no rales, rhonchi or wheezes  CV: regular rate and rhythm, normal S1 S2, no S3 or S4, no murmur, click or rub, no peripheral edema and peripheral pulses strong  ABDOMEN: soft, nontender, no hepatosplenomegaly, no masses and bowel sounds normal  MS: no gross musculoskeletal defects noted, no edema                "

## 2023-10-05 NOTE — TELEPHONE ENCOUNTER
Forwarding previous message to the correct [facility].     Health Call Center     Phone Message     May a detailed message be left on voicemail: yes      Reason for Call: Other: Please add lab orders for 2/13 appointment with Dr. Corley. Thank you!      NOTE: Patient states that he needs to be seen before his 3 month follow-up with referring provider. I scheduled first available and added him to the wait list. If there is any way he can be scheduled sooner, he would greatly appreciate it.      Action Taken: Other: CS Neph     Travel Screening: Not Applicable

## 2023-10-31 ENCOUNTER — TELEPHONE (OUTPATIENT)
Dept: FAMILY MEDICINE | Facility: CLINIC | Age: 68
End: 2023-10-31
Payer: COMMERCIAL

## 2023-10-31 DIAGNOSIS — E11.69 TYPE 2 DIABETES MELLITUS WITH OTHER SPECIFIED COMPLICATION, WITHOUT LONG-TERM CURRENT USE OF INSULIN (H): Primary | ICD-10-CM

## 2023-10-31 NOTE — TELEPHONE ENCOUNTER
"CC: Patient calling requesting refills of the following:    One touch ultra tests strips     One touch delica plus lancets     Per chart review, supplies were last ordered in 2021. Patient states \"I have only been checking it sparingly because I did not have any supplies\" When asked how often patient was instructed to check blood sugar, patient states he is not sure.    Routing to PCP to please clarify instructions for patient to check blood sugar and also refill if appropriate (orders pended).  Please review and advise - thank you!     Callback [phone] - ok to leave detailed VM     Cortes Cristobal RN  [facility]  "

## 2023-12-12 DIAGNOSIS — E11.69 TYPE 2 DIABETES MELLITUS WITH OTHER SPECIFIED COMPLICATION, WITHOUT LONG-TERM CURRENT USE OF INSULIN (H): ICD-10-CM

## 2023-12-12 RX ORDER — LANCETS 33 GAUGE
EACH MISCELLANEOUS
Qty: 300 EACH | Refills: 0 | Status: SHIPPED | OUTPATIENT
Start: 2023-12-12

## 2023-12-12 NOTE — TELEPHONE ENCOUNTER
Prescription approved per [facility] Refill Protocol.  Bertha Hatch, RN  [facility] Triage Nurse

## 2023-12-15 ENCOUNTER — LAB (OUTPATIENT)
Dept: LAB | Facility: CLINIC | Age: 68
End: 2023-12-15
Payer: COMMERCIAL

## 2023-12-15 DIAGNOSIS — R97.20 ELEVATED PROSTATE SPECIFIC ANTIGEN (PSA): ICD-10-CM

## 2023-12-15 LAB — PSA SERPL DL<=0.01 NG/ML-MCNC: 5.44 NG/ML (ref 0–4.5)

## 2023-12-15 PROCEDURE — 36415 COLL VENOUS BLD VENIPUNCTURE: CPT

## 2023-12-15 PROCEDURE — G0103 PSA SCREENING: HCPCS

## 2023-12-27 DIAGNOSIS — I10 ESSENTIAL HYPERTENSION, BENIGN: ICD-10-CM

## 2023-12-27 DIAGNOSIS — E11.69 TYPE 2 DIABETES MELLITUS WITH OTHER SPECIFIED COMPLICATION, WITHOUT LONG-TERM CURRENT USE OF INSULIN (H): ICD-10-CM

## 2023-12-27 RX ORDER — LISINOPRIL AND HYDROCHLOROTHIAZIDE 20; 25 MG/1; MG/1
1 TABLET ORAL DAILY
Qty: 90 TABLET | Refills: 1 | Status: SHIPPED | OUTPATIENT
Start: 2023-12-27 | End: 2024-05-13

## 2024-01-04 ENCOUNTER — LAB (OUTPATIENT)
Dept: LAB | Facility: CLINIC | Age: 69
End: 2024-01-04
Payer: COMMERCIAL

## 2024-01-04 DIAGNOSIS — E11.69 TYPE 2 DIABETES MELLITUS WITH OTHER SPECIFIED COMPLICATION, WITHOUT LONG-TERM CURRENT USE OF INSULIN (H): ICD-10-CM

## 2024-01-04 DIAGNOSIS — E11.9 DIABETES MELLITUS, TYPE 2 (H): Primary | ICD-10-CM

## 2024-01-04 LAB
ALBUMIN SERPL BCG-MCNC: 3.9 G/DL (ref 3.5–5.2)
ALP SERPL-CCNC: 106 U/L (ref 40–150)
ALT SERPL W P-5'-P-CCNC: 22 U/L (ref 0–70)
ANION GAP SERPL CALCULATED.3IONS-SCNC: 11 MMOL/L (ref 7–15)
AST SERPL W P-5'-P-CCNC: 22 U/L (ref 0–45)
BILIRUB SERPL-MCNC: 1.2 MG/DL
BUN SERPL-MCNC: 28.7 MG/DL (ref 8–23)
CALCIUM SERPL-MCNC: 9.5 MG/DL (ref 8.8–10.2)
CHLORIDE SERPL-SCNC: 105 MMOL/L (ref 98–107)
CREAT SERPL-MCNC: 1.67 MG/DL (ref 0.67–1.17)
DEPRECATED HCO3 PLAS-SCNC: 26 MMOL/L (ref 22–29)
EGFRCR SERPLBLD CKD-EPI 2021: 44 ML/MIN/1.73M2
GLUCOSE SERPL-MCNC: 101 MG/DL (ref 70–99)
HBA1C MFR BLD: 5.3 % (ref 0–5.6)
POTASSIUM SERPL-SCNC: 5.3 MMOL/L (ref 3.4–5.3)
PROT SERPL-MCNC: 7.1 G/DL (ref 6.4–8.3)
SODIUM SERPL-SCNC: 142 MMOL/L (ref 135–145)

## 2024-01-04 PROCEDURE — 80053 COMPREHEN METABOLIC PANEL: CPT

## 2024-01-04 PROCEDURE — 83036 HEMOGLOBIN GLYCOSYLATED A1C: CPT

## 2024-01-04 PROCEDURE — 36415 COLL VENOUS BLD VENIPUNCTURE: CPT

## 2024-01-30 DIAGNOSIS — I10 ESSENTIAL HYPERTENSION, BENIGN: Primary | ICD-10-CM

## 2024-01-30 DIAGNOSIS — E55.9 VITAMIN D DEFICIENCY: ICD-10-CM

## 2024-01-30 DIAGNOSIS — D64.9 ANEMIA: ICD-10-CM

## 2024-01-30 DIAGNOSIS — N18.32 STAGE 3B CHRONIC KIDNEY DISEASE (CKD) (H): ICD-10-CM

## 2024-02-07 NOTE — RESULT ENCOUNTER NOTE
Coronavirus (COVID-19) Notification    Your result for COVID-19 is Negative  Letter sent that will serve as a formal notice for your employer Pt here for C4 D1 Drug(s)AC/Fulphila.     Arrives Ambulating independently, accompanied by Self     Patient was evaluated today by Treatment Nurse.    Oral medications included in this regimen:  no    Patient confirms comprehension of cancer treatment schedule:  yes    Review next cycle starting weekly Paclitaxol     Pregnancy screening:  Denies possibility of pregnancy    Modifications in dose or schedule:  No    Medications appearance and physical integrity checked by RN: yes.    Chemotherapy IV pump settings verified by 2 RNs:  Yes.  Frequency of blood return and site check throughout administration: Prior to administration, Prior to each drug, and At completion of therapy     Infusion/treatment outcome:  patient tolerated treatment without incident    Education Record    Learner:  Patient  Barriers / Limitations:  None  Method:  Brief focused and Discussion  Education / instructions given:  Plan of care for treatment today  Outcome:  Shows understanding    Discharged Home, Ambulating independently, accompanied by:Self    Patient/family verbalized understanding of future appointments: by [employer] messaging

## 2024-02-13 ENCOUNTER — OFFICE VISIT (OUTPATIENT)
Dept: NEPHROLOGY | Facility: CLINIC | Age: 69
End: 2024-02-13
Attending: INTERNAL MEDICINE
Payer: COMMERCIAL

## 2024-02-13 ENCOUNTER — LAB (OUTPATIENT)
Dept: LAB | Facility: CLINIC | Age: 69
End: 2024-02-13
Payer: COMMERCIAL

## 2024-02-13 VITALS
HEIGHT: 69 IN | WEIGHT: 247 LBS | DIASTOLIC BLOOD PRESSURE: 75 MMHG | HEART RATE: 78 BPM | SYSTOLIC BLOOD PRESSURE: 183 MMHG | RESPIRATION RATE: 18 BRPM | OXYGEN SATURATION: 95 % | BODY MASS INDEX: 36.58 KG/M2

## 2024-02-13 DIAGNOSIS — N18.31 CHRONIC KIDNEY DISEASE, STAGE 3A (H): ICD-10-CM

## 2024-02-13 DIAGNOSIS — E66.01 MORBID OBESITY (H): ICD-10-CM

## 2024-02-13 DIAGNOSIS — D64.9 ANEMIA: ICD-10-CM

## 2024-02-13 DIAGNOSIS — N18.32 STAGE 3B CHRONIC KIDNEY DISEASE (CKD) (H): ICD-10-CM

## 2024-02-13 DIAGNOSIS — N18.32 STAGE 3B CHRONIC KIDNEY DISEASE (H): ICD-10-CM

## 2024-02-13 DIAGNOSIS — C64.2 RENAL CELL CARCINOMA, LEFT (H): ICD-10-CM

## 2024-02-13 DIAGNOSIS — I10 HYPERTENSION, ESSENTIAL: Primary | ICD-10-CM

## 2024-02-13 DIAGNOSIS — E11.69 TYPE 2 DIABETES MELLITUS WITH OTHER SPECIFIED COMPLICATION, WITHOUT LONG-TERM CURRENT USE OF INSULIN (H): ICD-10-CM

## 2024-02-13 DIAGNOSIS — E55.9 VITAMIN D DEFICIENCY: ICD-10-CM

## 2024-02-13 DIAGNOSIS — I10 ESSENTIAL HYPERTENSION, BENIGN: ICD-10-CM

## 2024-02-13 LAB
ALBUMIN UR-MCNC: NEGATIVE MG/DL
APPEARANCE UR: CLEAR
BILIRUB UR QL STRIP: NEGATIVE
COLOR UR AUTO: YELLOW
ERYTHROCYTE [DISTWIDTH] IN BLOOD BY AUTOMATED COUNT: 13 % (ref 10–15)
GLUCOSE UR STRIP-MCNC: NEGATIVE MG/DL
HCT VFR BLD AUTO: 42.6 % (ref 40–53)
HGB BLD-MCNC: 13.8 G/DL (ref 13.3–17.7)
HGB UR QL STRIP: ABNORMAL
KETONES UR STRIP-MCNC: NEGATIVE MG/DL
LEUKOCYTE ESTERASE UR QL STRIP: NEGATIVE
MCH RBC QN AUTO: 28.5 PG (ref 26.5–33)
MCHC RBC AUTO-ENTMCNC: 32.4 G/DL (ref 31.5–36.5)
MCV RBC AUTO: 88 FL (ref 78–100)
NITRATE UR QL: NEGATIVE
PH UR STRIP: 5.5 [PH] (ref 5–7)
PLATELET # BLD AUTO: 182 10E3/UL (ref 150–450)
RBC # BLD AUTO: 4.85 10E6/UL (ref 4.4–5.9)
RBC #/AREA URNS AUTO: NORMAL /HPF
SP GR UR STRIP: 1.02 (ref 1–1.03)
UROBILINOGEN UR STRIP-ACNC: 0.2 E.U./DL
WBC # BLD AUTO: 7.7 10E3/UL (ref 4–11)
WBC #/AREA URNS AUTO: NORMAL /HPF

## 2024-02-13 PROCEDURE — 82306 VITAMIN D 25 HYDROXY: CPT

## 2024-02-13 PROCEDURE — 82043 UR ALBUMIN QUANTITATIVE: CPT

## 2024-02-13 PROCEDURE — 80069 RENAL FUNCTION PANEL: CPT

## 2024-02-13 PROCEDURE — 99204 OFFICE O/P NEW MOD 45 MIN: CPT | Performed by: INTERNAL MEDICINE

## 2024-02-13 PROCEDURE — 36415 COLL VENOUS BLD VENIPUNCTURE: CPT

## 2024-02-13 PROCEDURE — 85027 COMPLETE CBC AUTOMATED: CPT

## 2024-02-13 PROCEDURE — 82728 ASSAY OF FERRITIN: CPT

## 2024-02-13 PROCEDURE — 83550 IRON BINDING TEST: CPT

## 2024-02-13 PROCEDURE — 84156 ASSAY OF PROTEIN URINE: CPT

## 2024-02-13 PROCEDURE — 81001 URINALYSIS AUTO W/SCOPE: CPT

## 2024-02-13 PROCEDURE — 83970 ASSAY OF PARATHORMONE: CPT

## 2024-02-13 PROCEDURE — 82570 ASSAY OF URINE CREATININE: CPT

## 2024-02-13 PROCEDURE — 83540 ASSAY OF IRON: CPT

## 2024-02-13 ASSESSMENT — PAIN SCALES - GENERAL: PAINLEVEL: NO PAIN (0)

## 2024-02-13 NOTE — PATIENT INSTRUCTIONS
It was a pleasure taking care of you today.  I've included a brief summary of our discussion and care plan from today's visit below.      My recommendations are summarized as follows:  - Check your blood pressure at home. Goal blood pressure is 130/80. Call my office if your blood pressure readings are running higher than that.  - Visit with my nurse in two weeks to evaluate your blood pressure at home.  - Return to Nephrology Clinic in 6 months to 1 year to review your progress.     Who do I call with any questions after my visit?  Please be in touch if there are any further questions that arise following today's visit.  There are multiple ways to contact your nephrology care team.      During business hours, you may reach your Nephrology RN Care Coordinator, Starr Paulino at [phone]. For urgent/emergent questions after business hours, you may reach the on-call Nephrology Fellow by contacting the [facility]  at [phone]. You can always send a secure message through [employer].  [employer] messages are answered by your nurse or doctor typically within 24 hours.  Please allow extra time on weekends and holidays.      How do I schedule appointments?  To schedule or reschedule an appointment, please call [phone]. To schedule imaging please call [phone]. To schedule your lab appointment at [address] lab call [phone].    How will I get the results of any tests ordered?    You will receive all of your results.  If you have signed up for [employer], any tests ordered at your visit will be available to you after your physician reviews them.  Typically this takes 1-2 weeks.  If there are urgent results that require a change in your care plan, your physician or nurse will call you to discuss the next steps.      What is [employer]?  [employer] is a secure way for you to access all of your healthcare records from the [facility].  It is a web based computer  program, so you can sign on to it from any location.  It also allows you to send secure messages to your care team.  I recommend signing up for [employer] access if you have not already done so and are comfortable with using a computer.      Sincerely,    Sandeep Corley MD  [facility]  Division of Nephrology and Hypertension

## 2024-02-13 NOTE — NURSING NOTE
"Chief Complaint   Patient presents with    New Patient     Stage 3b chronic kidney disease       Vitals:    02/13/24 1358 02/13/24 1400   BP: (!) 182/77 (!) 183/75   BP Location: Left arm Right arm   Patient Position: Sitting Sitting   Cuff Size: Adult Large Adult Large   Pulse: 78    Resp: 18    SpO2: 95%    Weight: 112 kg (247 lb)    Height: 1.753 m (5' 9\")        Body mass index is 36.48 kg/m .    "

## 2024-02-13 NOTE — PROGRESS NOTES
Nephrology Outpatient Consult Note (02/13/2024)     Requesting Provider  Kirill Beal MD  [address] [place] [address]    Chief Complaint/Reason for Visit  Chronic kidney disease    History of Present Illness  This is a 68-year-old male who was referred to our clinic for further workup and management of hypertension, and chronic kidney disease.  His past medical history is notable for penile cancer status post penectomy in 2022, and left renal cell carcinoma status post partial nephrectomy in 2022.  In addition, he has type 2 diabetes mellitus, obesity, and hypertension.    With regards to his kidney function, we have laboratory data going back to 2018.  Back then, the patient's serum creatinine running around 1.2 mg/dL.  Following the nephrectomy, the patient had an increase in his serum creatinine to a peak of 1.9 mg/dL, and then the serum creatinine subsequently decreased.  Over the course of 2023 he has been running between 1.6 to 1.7 mg/dL.    His most recent laboratory evaluation was last month.  His renal panel showed a creatinine of 1.6 mg/dL with an estimated GFR 44 mL/min.  His serum sodium, potassium, chloride, bicarbonate, calcium, phosphorus were normal.  His serum albumin is normal.  LFTs were normal.  Hemoglobin A1c was 5.3%.  His urinalysis is negative for proteinuria, or pyuria.  There is trace blood.  But no RBCs.    Overall, the patient feels well today.  No acute health issues or concerns.  His energy level is good.  He exercises by walking daily.  He states that he checks his blood pressure at home, and it runs normal.  However, his blood pressure today in our office is elevated.  I checked his blood pressure at the end of the visit, and his blood pressure was similar to his blood pressure prior to the visit.  No abdominal pain.  No lower extremity edema.  She denies dysuria or hematuria.    The following portions of the patient's history were reviewed and updated as  appropriate: allergies, current medications, past family history, past medical history, past social history, past surgical history, and problem list.    Subjective   Review of Systems  A comprehensive review of systems was performed. Pertinent positives and negatives are described above.    Past Medical/Surgical History  Patient  has a past medical history of Arthritis, BENIGN HYPERTENSION (07/13/2007), BLIND Right Eye, Cancer (H), Diabetes (H), Hernia, abdominal, Mumps, and Obesity, unspecified.    He has no past medical history of Asymptomatic human immunodeficiency virus (HIV) infection status (H), Blood in semen, Cerebral infarction (H), Cerebral palsy (H), Complication of anesthesia, Congenital renal agenesis and dysgenesis, Congestive heart failure (H), COPD (chronic obstructive pulmonary disease) (H), Depressive disorder, Epididymitis, bilateral, Epididymitis, left, Epididymitis, right, Goiter, Gout, Heart disease, History of blood transfusion, History of spinal cord injury, History of thrombophlebitis, Horseshoe kidney, Hydrocephalus (H), Malignant hyperthermia, Orchitis, epididymitis, and epididymo-orchitis, with abscess, Palpitations, Parkinsons disease, Penile discharge, Prostate infection, Spider veins, Spina bifida (H), STD (sexually transmitted disease), Swelling of testicle, Tethered cord (H), Thyroid disease, Tuberculosis, or Uncomplicated asthma.  Patient  has a past surgical history that includes circumcision,clamp (11/01/2007); Penectomy (N/A, 06/03/2022); Cystoscopy flexible (N/A, 06/03/2022); Laser holmium lithotripsy ureter(s), insert stent, combined (Right, 10/21/2022); daVINCI nephrectomy partial (Left, 11/25/2022); and Penis surgery.    Social History  Patient  reports that he has never smoked. He has never used smokeless tobacco. He reports that he does not drink alcohol and does not use drugs.    Family History  family history includes Asthma in his father; Cancer in his mother;  "Cerebrovascular Disease in his father; Hypertension in his father.     Physical Examination  Blood pressure (!) 183/75, pulse 78, resp. rate 18, height 1.753 m (5' 9\"), weight 112 kg (247 lb), SpO2 95%. Body mass index is 36.48 kg/m .  General:  Well appearing, well nourished in no distress.  Normal mood and affect.  Throat:  No erythema, exudates or lesions.   Neck:  neck supple, no masses  Heart:  RRR, no murmur   Lungs:  CTA bilaterally, no wheezes, rhonchi, rales.  Breathing unlabored.   Abdomen:  Soft, NT/ND, no HSM, no masses.   Extremities: No deformities, clubbing, cyanosis, or edema.     Objective   Pertinent Laboratory and Imaging Results  Most Recent Serum Chemistries  Lab Results   Component Value Date    WBC 7.7 02/13/2024    HGB 13.8 02/13/2024    HCT 42.6 02/13/2024     02/13/2024     01/04/2024    POTASSIUM 5.3 01/04/2024    CHLORIDE 105 01/04/2024    CO2 26 01/04/2024    BUN 28.7 (H) 01/04/2024    CR 1.67 (H) 01/04/2024     (H) 01/04/2024    NTBNPI 213 08/03/2019    TROPI <0.015 08/03/2019    AST 22 01/04/2024    ALT 22 01/04/2024    ALKPHOS 106 01/04/2024     Most Recent Urinalysis  Lab Results   Component Value Date    COLOR Yellow 02/13/2024    APPEARANCE Clear 02/13/2024    URINEGLC Negative 02/13/2024    URINEBILI Negative 02/13/2024    URINEKETONE Negative 02/13/2024    SG 1.025 02/13/2024    URINEPH 5.5 02/13/2024    PROTEIN Negative 02/13/2024    UROBILINOGEN 0.2 02/13/2024    NITRITE Negative 02/13/2024    LEUKEST Negative 02/13/2024     Current Outpatient Medications   Medication    amLODIPine (NORVASC) 5 MG tablet    ASPIRIN NOT PRESCRIBED (INTENTIONAL)    atorvastatin (LIPITOR) 20 MG tablet    brimonidine (ALPHAGAN) 0.2 % ophthalmic solution    lisinopril-hydrochlorothiazide (ZESTORETIC) 20-25 MG tablet    metoprolol succinate ER (TOPROL XL) 50 MG 24 hr tablet    VITAMIN D PO    zinc gluconate 50 MG tablet    blood glucose (ACCU-CHEK SOLO PLUS) test strip    blood " glucose (NO BRAND SPECIFIED) lancets standard    blood glucose (NO BRAND SPECIFIED) test strip    blood glucose monitoring (ACCU-CHEK SOLO PLUS) meter device kit    brimonidine-timolol (COMBIGAN) 0.2-0.5 % ophthalmic solution    ibuprofen (ADVIL/MOTRIN) 600 MG tablet    Lancets (ONETOUCH DELICA PLUS [id number]) MISC    ondansetron (ZOFRAN ODT) 4 MG ODT tab    Turmeric 450 MG CAPS     No current facility-administered medications for this visit.     CT Abdomen/Pelvis Sep 2023  1.  Status post left partial nephrectomy without evidence of local recurrence. Decreased superoposterior ill-defined fluid.  2.  Similar scattered subcentimeter retroperitoneal and borderline enlarged left external iliac chain lymph nodes. No new sites of disease.     Assessment & Plan   # Chronic kidney disease stage 3a  # Renal cell carcinoma, left, status post partial nephrectomy  # Penile cancer   # Essential hypertension, benign  # Morbid obesity  # Diabetes mellitus, type 2    The patient has chronic kidney disease stage IIIa as a result of a prior partial nephrectomy for renal cell cancer.  In addition, he has hypertension, and type 2 diabetes mellitus, both of which contribute to chronic kidney disease.    Saw his lab work today remains pending.  However, it is reassuring that his urinalysis shows a bland urine sediment.  Thus, it is unlikely that the patient has parenchymal kidney disease and I do not recommend any further investigation at this point.    However, it is very important for the patient to get his blood pressure under control.  I offered to increase his lisinopril-hydrochlorothiazide to 40-25.  However, he was very reluctant stating that his blood pressure was normal at home, though he used a wrist blood pressure cuff.    I instructed the patient to buy a new blood pressure machine with a cuff that goes around the arm, and to check his blood pressure daily.  I will ask my nurse to visit with him in 1 to 2 weeks to review  the readings.  The goal blood pressure for him should be 130/80.    Also with regards to medical management, he would benefit from adding an SGL 2 inhibitor in the future if he needs a medication to get his diabetes under control.    My recommendations are the following:  - Check your blood pressure at home. Goal blood pressure is 130/80. Call my office if your blood pressure readings are running higher than that.  - Visit with my nurse in two weeks to evaluate your blood pressure at home.  - Return to Nephrology Clinic in 6 months to 1 year to review your progress.     Total time was of this encounter on the date of service was 50 minutes. All questions were answered to the patient's satisfaction.  Chart documentation was completed, in part, with [employer] voice-recognition software. Even though reviewed, some grammatical, spelling, and word errors may remain.    Orders placed today:  No orders of the defined types were placed in this encounter.    Sandeep Corley MD  Division of Nephrology and Hypertension  [employer] ([email or web address])   Vocera Web Console ([email or web address])

## 2024-02-14 LAB
ALBUMIN MFR UR ELPH: 11.4 MG/DL
ALBUMIN SERPL BCG-MCNC: 4.2 G/DL (ref 3.5–5.2)
ANION GAP SERPL CALCULATED.3IONS-SCNC: 11 MMOL/L (ref 7–15)
BUN SERPL-MCNC: 30.4 MG/DL (ref 8–23)
CALCIUM SERPL-MCNC: 9.6 MG/DL (ref 8.8–10.2)
CHLORIDE SERPL-SCNC: 106 MMOL/L (ref 98–107)
CREAT SERPL-MCNC: 1.51 MG/DL (ref 0.67–1.17)
CREAT UR-MCNC: 140 MG/DL
CREAT UR-MCNC: 140 MG/DL
DEPRECATED HCO3 PLAS-SCNC: 25 MMOL/L (ref 22–29)
EGFRCR SERPLBLD CKD-EPI 2021: 50 ML/MIN/1.73M2
FERRITIN SERPL-MCNC: 117 NG/ML (ref 31–409)
GLUCOSE SERPL-MCNC: 103 MG/DL (ref 70–99)
IRON BINDING CAPACITY (ROCHE): 191 UG/DL (ref 240–430)
IRON SATN MFR SERPL: 32 % (ref 15–46)
IRON SERPL-MCNC: 62 UG/DL (ref 61–157)
MICROALBUMIN UR-MCNC: <12 MG/L
MICROALBUMIN/CREAT UR: NORMAL MG/G{CREAT}
PHOSPHATE SERPL-MCNC: 3.2 MG/DL (ref 2.5–4.5)
POTASSIUM SERPL-SCNC: 4.6 MMOL/L (ref 3.4–5.3)
PROT/CREAT 24H UR: 0.08 MG/MG CR (ref 0–0.2)
PTH-INTACT SERPL-MCNC: 35 PG/ML (ref 15–65)
SODIUM SERPL-SCNC: 142 MMOL/L (ref 135–145)
VIT D+METAB SERPL-MCNC: 41 NG/ML (ref 20–50)

## 2024-02-19 ENCOUNTER — DOCUMENTATION ONLY (OUTPATIENT)
Dept: ONCOLOGY | Facility: CLINIC | Age: 69
End: 2024-02-19
Payer: COMMERCIAL

## 2024-02-19 DIAGNOSIS — R97.20 ELEVATED PROSTATE SPECIFIC ANTIGEN (PSA): ICD-10-CM

## 2024-02-19 DIAGNOSIS — C64.2 RENAL CELL CARCINOMA, LEFT (H): Primary | ICD-10-CM

## 2024-02-19 NOTE — PROGRESS NOTES
Brian Flynn has an upcoming lab appointment:    Future Appointments   Date Time Provider Department Center   3/4/2024  9:15 AM CS LAB [facility]    3/4/2024 10:00 AM EICCT1 SHCTIC [place] [facility]   3/7/2024  3:30 PM Alcon Whittaker MD [facility]   7/9/2024  9:30 [facility]     Patient is scheduled for the following lab(s): pt is requesting psa.    There is no order available. Please review and place either future orders or HMPO (Review of Health Maintenance Protocol Orders), as appropriate.    There are no preventive care reminders to display for this patient.  Rani Bassett

## 2024-02-21 ENCOUNTER — PATIENT OUTREACH (OUTPATIENT)
Dept: NEPHROLOGY | Facility: CLINIC | Age: 69
End: 2024-02-21
Payer: COMMERCIAL

## 2024-02-21 NOTE — PROGRESS NOTES
Nephrology Note: Patient Outreach Encounter    REASON FOR CALL:     REASON FOR CALL: Clinic Care Coordination - Initial                                  SITUATION/BACKROUND:   Patient is being treated for HTN.        Patient Journey Status:  Active:   Neph Tracking Flowsheet Last Filled Values       CKD Education Status Not Applicable    Patient's Referral Dates Auto Populate Patient's Referral Dates    Journey Referral 2/13/24            ASSESSMENT:         Neph Assessments:  No assessment indicated    PLAN:     Education:  Method:  general discussion/verbal explanation  Discussed: managing blood pressure  These interventions were used: Permission to raise concern or advise  Education material provided and patient was given an opportunity to ask questions.      Follow Up:   Follow up call in 1-2 weeks     Patient verbalized understanding and will follow up as recommended.    Starr Paulino RN

## 2024-02-29 DIAGNOSIS — E11.69 TYPE 2 DIABETES MELLITUS WITH OTHER SPECIFIED COMPLICATION, WITHOUT LONG-TERM CURRENT USE OF INSULIN (H): ICD-10-CM

## 2024-02-29 RX ORDER — BLOOD SUGAR DIAGNOSTIC
STRIP MISCELLANEOUS
Qty: 100 STRIP | Refills: 0 | Status: SHIPPED | OUTPATIENT
Start: 2024-02-29 | End: 2024-04-01

## 2024-03-04 ENCOUNTER — LAB (OUTPATIENT)
Dept: LAB | Facility: CLINIC | Age: 69
End: 2024-03-04
Payer: COMMERCIAL

## 2024-03-04 ENCOUNTER — ANCILLARY PROCEDURE (OUTPATIENT)
Dept: CT IMAGING | Facility: CLINIC | Age: 69
End: 2024-03-04
Attending: UROLOGY
Payer: COMMERCIAL

## 2024-03-04 DIAGNOSIS — R97.20 ELEVATED PROSTATE SPECIFIC ANTIGEN (PSA): ICD-10-CM

## 2024-03-04 DIAGNOSIS — C64.2 RENAL CELL CARCINOMA, LEFT (H): ICD-10-CM

## 2024-03-04 LAB
ANION GAP SERPL CALCULATED.3IONS-SCNC: 9 MMOL/L (ref 7–15)
BUN SERPL-MCNC: 36.4 MG/DL (ref 8–23)
CALCIUM SERPL-MCNC: 9.7 MG/DL (ref 8.8–10.2)
CHLORIDE SERPL-SCNC: 106 MMOL/L (ref 98–107)
CREAT SERPL-MCNC: 1.71 MG/DL (ref 0.67–1.17)
DEPRECATED HCO3 PLAS-SCNC: 26 MMOL/L (ref 22–29)
EGFRCR SERPLBLD CKD-EPI 2021: 43 ML/MIN/1.73M2
GLUCOSE SERPL-MCNC: 119 MG/DL (ref 70–99)
POTASSIUM SERPL-SCNC: 4.8 MMOL/L (ref 3.4–5.3)
PSA SERPL DL<=0.01 NG/ML-MCNC: 4.66 NG/ML (ref 0–4.5)
SODIUM SERPL-SCNC: 141 MMOL/L (ref 135–145)

## 2024-03-04 PROCEDURE — 36415 COLL VENOUS BLD VENIPUNCTURE: CPT

## 2024-03-04 PROCEDURE — 250N000011 HC RX IP 250 OP 636: Performed by: UROLOGY

## 2024-03-04 PROCEDURE — 74177 CT ABD & PELVIS W/CONTRAST: CPT

## 2024-03-04 PROCEDURE — 250N000009 HC RX 250: Performed by: UROLOGY

## 2024-03-04 PROCEDURE — 80048 BASIC METABOLIC PNL TOTAL CA: CPT

## 2024-03-04 PROCEDURE — 84153 ASSAY OF PSA TOTAL: CPT

## 2024-03-04 RX ORDER — IOPAMIDOL 755 MG/ML
100 INJECTION, SOLUTION INTRAVASCULAR ONCE
Status: COMPLETED | OUTPATIENT
Start: 2024-03-04 | End: 2024-03-04

## 2024-03-04 RX ADMIN — IOPAMIDOL 100 ML: 755 INJECTION, SOLUTION INTRAVENOUS at 09:20

## 2024-03-04 RX ADMIN — SODIUM CHLORIDE 40 ML: 9 INJECTION, SOLUTION INTRAVENOUS at 09:20

## 2024-03-05 ENCOUNTER — PATIENT OUTREACH (OUTPATIENT)
Dept: CARE COORDINATION | Facility: CLINIC | Age: 69
End: 2024-03-05
Payer: COMMERCIAL

## 2024-03-06 DIAGNOSIS — I10 ESSENTIAL HYPERTENSION, BENIGN: ICD-10-CM

## 2024-03-07 ENCOUNTER — OFFICE VISIT (OUTPATIENT)
Dept: UROLOGY | Facility: CLINIC | Age: 69
End: 2024-03-07
Payer: COMMERCIAL

## 2024-03-07 VITALS
HEIGHT: 69 IN | DIASTOLIC BLOOD PRESSURE: 76 MMHG | OXYGEN SATURATION: 96 % | WEIGHT: 240 LBS | BODY MASS INDEX: 35.55 KG/M2 | SYSTOLIC BLOOD PRESSURE: 185 MMHG | HEART RATE: 70 BPM

## 2024-03-07 DIAGNOSIS — R97.20 ELEVATED PROSTATE SPECIFIC ANTIGEN (PSA): ICD-10-CM

## 2024-03-07 DIAGNOSIS — C60.9 PENILE CANCER (H): ICD-10-CM

## 2024-03-07 DIAGNOSIS — C64.2 RENAL CELL CARCINOMA, LEFT (H): Primary | ICD-10-CM

## 2024-03-07 PROCEDURE — 99213 OFFICE O/P EST LOW 20 MIN: CPT | Performed by: UROLOGY

## 2024-03-07 ASSESSMENT — PAIN SCALES - GENERAL: PAINLEVEL: NO PAIN (0)

## 2024-03-07 NOTE — PROGRESS NOTES
CHIEF COMPLAINT   It was my pleasure to see Brian Flynn who is a 68 year old male for follow-up of penile cancer and renal cell carcinoma.      HPI  Brian Flynn is a very pleasant 68 year old male who presents with a history of penile cancer. He noted burning with urination and palpable mass. Now s/p partial penectomy 6/3/2022. Demonstrated well-differentiated, stage pT1a. No palpable nodes or visible nodes on CT. Margins negative from resection. He overall is doing well and reports improvement in his voiding.      He also has history of left renal mass. Now s/p left robotic partial nephrectomy on 11/252022. 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 No recurrence noted on scan.     Of note, he also had prior right ureteral stone extracted via ureteroscopy in 2022.      Creat 1.71     PSA  3/4/2024 - 4.66  12/15/2023 - 5.44  9/21/2023 - 6.4  3/15/2023 - 3.94  5/26/2022 - 3.0  2/25/2022 - 4.09     CT abd/pelvis w contrast 3/4/2024  IMPRESSION:   1.  No definite evidence of metastatic disease in the abdomen or pelvis. No change from previous.     Left Robotic Partial Nephrectomy  11/25/2022          SPECIMEN   Procedure   Partial nephrectomy    Specimen Laterality   Left    TUMOR   Tumor Focality   Unifocal    Tumor Site   Not specified    Tumor Size   Greatest Dimension (Centimeters): 4.3 cm   Histologic Type   Papillary renal cell carcinoma, type 1    Histologic Grade (WHO / ISUP)   G2 (nucleoli conspicuous and eosinophilic at 400x magnification, visible but not prominent at 100x magnification)    Tumor Extent   Limited to kidney    Sarcomatoid Features   Not identified    Rhabdoid Features   Not identified    Tumor Necrosis   Present    MARGINS   Margin Status   All margins negative for invasive carcinoma    REGIONAL LYMPH NODES   Regional Lymph Node Status   Not applicable (no regional lymph nodes submitted or found)    PATHOLOGIC STAGE CLASSIFICATION (pTNM, AJCC 8th  Edition)   Reporting of pT, pN, and (when applicable) pM categories is based on information available to the pathologist at the time the report is issued. As per the AJCC (Chapter 1, 8th Ed.) it is the managing physician s responsibility to establish the final pathologic stage based upon all pertinent information, including but potentially not limited to this pathology report.   Primary Tumor (pT)   pT1b    Regional Lymph Nodes (pN)   pN not assigned (no nodes submitted or found)    ADDITIONAL FINDINGS   Additional Findings in Nonneoplastic Kidney   None identified        Partial Penectomy 6/3/2022          TUMOR   Tumor Focality   Unifocal    Tumor Site   Glans    Tumor Macroscopic Features   Polypoid    Tumor Size   Greatest Dimension (Centimeters): 5.1 cm   Histologic Type   Squamous cell carcinoma, usual type    Histologic Grade   G1, well-differentiated    Tumor Thickness / Depth of Invasion   7.0 mm   Tumor Deep Borders   Pushing (broadly based)    Tumor Extent   Invades lamina propria        Invades dermis    Lymphovascular Invasion   Not identified    Perineural Invasion   Not identified    MARGINS   Margin Status for Invasive Carcinoma   All margins negative for invasive carcinoma    Closest Margin(s) to Invasive Carcinoma   Skin    Distance from Invasive Carcinoma to Closest Margin   9 mm   Margin Status for Noninvasive Carcinoma / Carcinoma in Situ   All margins negative for non-invasive carcinoma / carcinoma in situ               PATHOLOGIC STAGE CLASSIFICATION (pTNM, AJCC 8th Edition)   Reporting of pT, pN, and (when applicable) pM categories is based on information available to the pathologist at the time the report is issued. As per the AJCC (Chapter 1, 8th Ed.) it is the managing physician s responsibility to establish the final pathologic stage based upon all pertinent information, including but potentially not limited to this pathology report.   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  General Appearance Adult: Body mass index is 35.44 kg/m .  Alert, no acute distress, oriented  Lungs: no respiratory distress, or pursed lip breathing  Abdomen: soft, nontender, no organomegaly or masses  Back: no CVAT  Neuro: Alert, oriented, speech and mentation normal  Psych: affect and mood normal  : penectomy sites evaluated with no evidence of recurrent lesions    Outside and Past Medical records:  Review of the result(s) of each unique test - CT, creat, PSA    ASSESSMENT and PLAN  68 year old male with penile cancer, ureteral stone, and left renal mass.     Penile Cancer - s/p partial penectomy 6/3/2022 with stage pT1a squamous cell carcinoma. No palpable nodes. Margins of resection negative. CT with no evidence of disease recurrence.     Renal Mass - Partial nephrectomy completed with stage pT1a papillary RCC with negative margins. Partial completed 11/25/2022. No recurrence noted today.     Elevated PSA - PSA back down to 4.66. At this point, will plan PSA recheck in 12 months.     - Follow-up 12 months with CT, PSA, and BMP    15 minutes spent on the date of the encounter doing chart review, history and exam, documentation and further activities as noted above.    Alcon Whittaker MD  Urology  [facility]    "

## 2024-03-07 NOTE — NURSING NOTE
Chief Complaint   Patient presents with    LT RENAL CARCINOMA      CT ABD AND PELVIS DONE  03 -04 -2024    Told patient to call primary doctor about the blood pressure   Talk about the results today today   Avtar Wood, JOSE C   Everything is going well pr patient

## 2024-03-07 NOTE — Clinical Note
3/7/2024       RE: Brian Flynn  [address]  [place] [address]     Dear Colleague,    Thank you for referring your patient, Brian Flynn, to the [facility] at [facility]. Please see a copy of my visit note below.      CHIEF COMPLAINT   It was my pleasure to see Brian Flynn who is a 68 year old male for follow-up of penile cancer and renal cell carcinoma.      HPI  Brian Flynn is a very pleasant 68 year old male who presents with a history of penile cancer. He noted burning with urination and palpable mass. Now s/p partial penectomy 6/3/2022. Demonstrated well-differentiated, stage pT1a. No palpable nodes or visible nodes on CT. Margins negative from resection. He overall is doing well and reports improvement in his voiding.      He also has history of left renal mass. Now s/p left robotic partial nephrectomy on 11/252022. 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 No recurrence noted on scan.     Of note, he also had prior right ureteral stone extracted via ureteroscopy in 2022.      Creat 1.71     PSA  3/4/2024 - 4.66  12/15/2023 - 5.44  9/21/2023 - 6.4  3/15/2023 - 3.94  5/26/2022 - 3.0  2/25/2022 - 4.09     CT abd/pelvis w contrast 3/4/2024  IMPRESSION:   1.  No definite evidence of metastatic disease in the abdomen or pelvis. No change from previous.     Left Robotic Partial Nephrectomy  11/25/2022          SPECIMEN   Procedure   Partial nephrectomy    Specimen Laterality   Left    TUMOR   Tumor Focality   Unifocal    Tumor Site   Not specified    Tumor Size   Greatest Dimension (Centimeters): 4.3 cm   Histologic Type   Papillary renal cell carcinoma, type 1    Histologic Grade (WHO / ISUP)   G2 (nucleoli conspicuous and eosinophilic at 400x magnification, visible but not prominent at 100x magnification)    Tumor Extent   Limited to kidney    Sarcomatoid Features   Not  identified    Rhabdoid Features   Not identified    Tumor Necrosis   Present    MARGINS   Margin Status   All margins negative for invasive carcinoma    REGIONAL LYMPH NODES   Regional Lymph Node Status   Not applicable (no regional lymph nodes submitted or found)    PATHOLOGIC STAGE CLASSIFICATION (pTNM, AJCC 8th Edition)   Reporting of pT, pN, and (when applicable) pM categories is based on information available to the pathologist at the time the report is issued. As per the AJCC (Chapter 1, 8th Ed.) it is the managing physician s responsibility to establish the final pathologic stage based upon all pertinent information, including but potentially not limited to this pathology report.   Primary Tumor (pT)   pT1b    Regional Lymph Nodes (pN)   pN not assigned (no nodes submitted or found)    ADDITIONAL FINDINGS   Additional Findings in Nonneoplastic Kidney   None identified        Partial Penectomy 6/3/2022          TUMOR   Tumor Focality   Unifocal    Tumor Site   Glans    Tumor Macroscopic Features   Polypoid    Tumor Size   Greatest Dimension (Centimeters): 5.1 cm   Histologic Type   Squamous cell carcinoma, usual type    Histologic Grade   G1, well-differentiated    Tumor Thickness / Depth of Invasion   7.0 mm   Tumor Deep Borders   Pushing (broadly based)    Tumor Extent   Invades lamina propria        Invades dermis    Lymphovascular Invasion   Not identified    Perineural Invasion   Not identified    MARGINS   Margin Status for Invasive Carcinoma   All margins negative for invasive carcinoma    Closest Margin(s) to Invasive Carcinoma   Skin    Distance from Invasive Carcinoma to Closest Margin   9 mm   Margin Status for Noninvasive Carcinoma / Carcinoma in Situ   All margins negative for non-invasive carcinoma / carcinoma in situ               PATHOLOGIC STAGE CLASSIFICATION (pTNM, AJCC 8th Edition)   Reporting of pT, pN, and (when applicable) pM categories is based on information available to the  "pathologist at the time the report is issued. As per the AJCC (Chapter 1, 8th Ed.) it is the managing physician s responsibility to establish the final pathologic stage based upon all pertinent information, including but potentially not limited to this pathology report.   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  General Appearance Adult: Body mass index is 35.44 kg/m .  Alert, no acute distress, oriented  Lungs: no respiratory distress, or pursed lip breathing  Abdomen: soft, nontender, no organomegaly or masses; ***  Back: *** CVAT  Neuro: Alert, oriented, speech and mentation normal  Psych: affect and mood normal  : {RAFA EXAM MALE GENITAL:019950}    Outside and Past Medical records:  Review of the result(s) of each unique test - CT, creat, PSA    ASSESSMENT and PLAN  68 year old male with penile cancer, ureteral stone, and left renal mass.     Penile Cancer - s/p partial penectomy 6/3/2022 with stage pT1a squamous cell carcinoma. No palpable nodes. Margins of resection negative. CT with no evidence of disease recurrence.     Renal Mass - Partial nephrectomy completed with stage pT1a papillary RCC with negative margins. Partial completed 11/25/2022. No recurrence noted today.     Elevated PSA - PSA up a bit to 6.4 today. We reviewed possibility of prostate cancer and other benign causes. At this point, will plan PSA recheck in 3 months and will call with results.     - Follow-up 12 months with CT, PSA, and BMP    *** minutes spent on the date of the encounter doing chart review, history and exam, documentation and further activities as noted above.    Alcon Whittaker MD  Urology  [facility]        Again, thank you for allowing me to participate in the care of your patient.      Sincerely,    Alcon Whittaker MD

## 2024-03-12 ENCOUNTER — PATIENT OUTREACH (OUTPATIENT)
Dept: NEPHROLOGY | Facility: CLINIC | Age: 69
End: 2024-03-12
Payer: COMMERCIAL

## 2024-03-12 DIAGNOSIS — I10 ESSENTIAL HYPERTENSION, BENIGN: ICD-10-CM

## 2024-03-12 RX ORDER — AMLODIPINE BESYLATE 5 MG/1
5 TABLET ORAL DAILY
Qty: 90 TABLET | Refills: 0 | OUTPATIENT
Start: 2024-03-12

## 2024-03-12 RX ORDER — AMLODIPINE BESYLATE 5 MG/1
TABLET ORAL
Qty: 90 TABLET | Refills: 0 | Status: SHIPPED | OUTPATIENT
Start: 2024-03-12 | End: 2024-06-11

## 2024-03-12 NOTE — TELEPHONE ENCOUNTER
Medication Question or Refill    Contacts         Type Contact Phone/Fax    03/12/2024 01:09 PM CDT Phone (Incoming) Lucas Flynnosei GUADARRAMA (Self) [phone] (M)            What medication are you calling about (include dose and sig)?: amlodipine besylate 5 mg tablets    Preferred Pharmacy:   [id number] - [place], [place] - [address] AT [facility] [address]  [address]  [facility] [facility] [address]  Phone: [phone] Fax: [phone]      Controlled Substance Agreement on file:   CSA -- Patient Level:    CSA: None found at the patient level.       Who prescribed the medication?: PCP    Do you need a refill? Yes    When did you use the medication last? everyday    Patient offered an appointment? No    Do you have any questions or concerns?  Yes: pharmacy told pt. That PCP needs to approve a refill and call them.       Could we send this information to you in [employer] or would you prefer to receive a phone call?:   Patient would like to be contacted via [employer]

## 2024-03-19 ENCOUNTER — PATIENT OUTREACH (OUTPATIENT)
Dept: CARE COORDINATION | Facility: CLINIC | Age: 69
End: 2024-03-19
Payer: COMMERCIAL

## 2024-04-01 DIAGNOSIS — E11.69 TYPE 2 DIABETES MELLITUS WITH OTHER SPECIFIED COMPLICATION, WITHOUT LONG-TERM CURRENT USE OF INSULIN (H): ICD-10-CM

## 2024-04-01 RX ORDER — BLOOD SUGAR DIAGNOSTIC
STRIP MISCELLANEOUS
Qty: 100 STRIP | Refills: 0 | Status: SHIPPED | OUTPATIENT
Start: 2024-04-01 | End: 2024-07-15

## 2024-04-10 DIAGNOSIS — I10 ESSENTIAL HYPERTENSION, BENIGN: ICD-10-CM

## 2024-04-11 RX ORDER — METOPROLOL SUCCINATE 50 MG/1
75 TABLET, EXTENDED RELEASE ORAL DAILY
Qty: 135 TABLET | Refills: 1 | Status: SHIPPED | OUTPATIENT
Start: 2024-04-11 | End: 2024-07-09

## 2024-04-21 DIAGNOSIS — E78.5 HYPERLIPIDEMIA LDL GOAL <130: ICD-10-CM

## 2024-04-22 RX ORDER — ATORVASTATIN CALCIUM 20 MG/1
TABLET, FILM COATED ORAL
Qty: 90 TABLET | Refills: 0 | Status: SHIPPED | OUTPATIENT
Start: 2024-04-22 | End: 2024-07-10

## 2024-05-13 DIAGNOSIS — I10 ESSENTIAL HYPERTENSION, BENIGN: ICD-10-CM

## 2024-05-13 DIAGNOSIS — E11.69 TYPE 2 DIABETES MELLITUS WITH OTHER SPECIFIED COMPLICATION, WITHOUT LONG-TERM CURRENT USE OF INSULIN (H): ICD-10-CM

## 2024-05-14 RX ORDER — LISINOPRIL AND HYDROCHLOROTHIAZIDE 20; 25 MG/1; MG/1
1 TABLET ORAL DAILY
Qty: 90 TABLET | Refills: 0 | Status: SHIPPED | OUTPATIENT
Start: 2024-05-14 | End: 2024-07-09

## 2024-06-02 ENCOUNTER — HEALTH MAINTENANCE LETTER (OUTPATIENT)
Age: 69
End: 2024-06-02

## 2024-06-09 ENCOUNTER — OFFICE VISIT (OUTPATIENT)
Dept: URGENT CARE | Facility: URGENT CARE | Age: 69
End: 2024-06-09
Payer: COMMERCIAL

## 2024-06-09 ENCOUNTER — ANCILLARY PROCEDURE (OUTPATIENT)
Dept: GENERAL RADIOLOGY | Facility: CLINIC | Age: 69
End: 2024-06-09
Attending: FAMILY MEDICINE
Payer: COMMERCIAL

## 2024-06-09 VITALS
BODY MASS INDEX: 36.59 KG/M2 | SYSTOLIC BLOOD PRESSURE: 171 MMHG | WEIGHT: 247.8 LBS | HEART RATE: 80 BPM | RESPIRATION RATE: 18 BRPM | DIASTOLIC BLOOD PRESSURE: 79 MMHG | OXYGEN SATURATION: 97 % | TEMPERATURE: 98.6 F

## 2024-06-09 DIAGNOSIS — E11.69 TYPE 2 DIABETES MELLITUS WITH OTHER SPECIFIED COMPLICATION, WITHOUT LONG-TERM CURRENT USE OF INSULIN (H): ICD-10-CM

## 2024-06-09 DIAGNOSIS — M79.671 RIGHT FOOT PAIN: ICD-10-CM

## 2024-06-09 DIAGNOSIS — M10.9 ACUTE GOUT OF RIGHT FOOT, UNSPECIFIED CAUSE: Primary | ICD-10-CM

## 2024-06-09 DIAGNOSIS — N18.31 CHRONIC KIDNEY DISEASE, STAGE 3A (H): ICD-10-CM

## 2024-06-09 LAB
ANION GAP SERPL CALCULATED.3IONS-SCNC: 5 MMOL/L (ref 3–14)
BASOPHILS # BLD AUTO: 0 10E3/UL (ref 0–0.2)
BASOPHILS NFR BLD AUTO: 0 %
BUN SERPL-MCNC: 31 MG/DL (ref 7–30)
CALCIUM SERPL-MCNC: 9.5 MG/DL (ref 8.5–10.1)
CHLORIDE BLD-SCNC: 105 MMOL/L (ref 94–109)
CO2 SERPL-SCNC: 30 MMOL/L (ref 20–32)
CREAT SERPL-MCNC: 1.6 MG/DL (ref 0.66–1.25)
CRP SERPL-MCNC: 31.56 MG/L
EGFRCR SERPLBLD CKD-EPI 2021: 46 ML/MIN/1.73M2
EOSINOPHIL # BLD AUTO: 0.1 10E3/UL (ref 0–0.7)
EOSINOPHIL NFR BLD AUTO: 1 %
ERYTHROCYTE [DISTWIDTH] IN BLOOD BY AUTOMATED COUNT: 12.4 % (ref 10–15)
GLUCOSE BLD-MCNC: 115 MG/DL (ref 70–99)
HCT VFR BLD AUTO: 41.9 % (ref 40–53)
HGB BLD-MCNC: 13.7 G/DL (ref 13.3–17.7)
IMM GRANULOCYTES # BLD: 0 10E3/UL
IMM GRANULOCYTES NFR BLD: 0 %
LYMPHOCYTES # BLD AUTO: 1.6 10E3/UL (ref 0.8–5.3)
LYMPHOCYTES NFR BLD AUTO: 15 %
MCH RBC QN AUTO: 28.8 PG (ref 26.5–33)
MCHC RBC AUTO-ENTMCNC: 32.7 G/DL (ref 31.5–36.5)
MCV RBC AUTO: 88 FL (ref 78–100)
MONOCYTES # BLD AUTO: 0.8 10E3/UL (ref 0–1.3)
MONOCYTES NFR BLD AUTO: 8 %
NEUTROPHILS # BLD AUTO: 8.3 10E3/UL (ref 1.6–8.3)
NEUTROPHILS NFR BLD AUTO: 77 %
PLATELET # BLD AUTO: 176 10E3/UL (ref 150–450)
POTASSIUM BLD-SCNC: 4.5 MMOL/L (ref 3.4–5.3)
RBC # BLD AUTO: 4.75 10E6/UL (ref 4.4–5.9)
SODIUM SERPL-SCNC: 140 MMOL/L (ref 135–145)
URATE SERPL-MCNC: 8.2 MG/DL (ref 3.4–7)
WBC # BLD AUTO: 10.8 10E3/UL (ref 4–11)

## 2024-06-09 PROCEDURE — 36415 COLL VENOUS BLD VENIPUNCTURE: CPT | Performed by: FAMILY MEDICINE

## 2024-06-09 PROCEDURE — 99214 OFFICE O/P EST MOD 30 MIN: CPT | Performed by: FAMILY MEDICINE

## 2024-06-09 PROCEDURE — 85025 COMPLETE CBC W/AUTO DIFF WBC: CPT | Performed by: FAMILY MEDICINE

## 2024-06-09 PROCEDURE — 73630 X-RAY EXAM OF FOOT: CPT | Mod: TC | Performed by: RADIOLOGY

## 2024-06-09 PROCEDURE — 84550 ASSAY OF BLOOD/URIC ACID: CPT | Performed by: FAMILY MEDICINE

## 2024-06-09 PROCEDURE — 80048 BASIC METABOLIC PNL TOTAL CA: CPT | Performed by: FAMILY MEDICINE

## 2024-06-09 PROCEDURE — 86140 C-REACTIVE PROTEIN: CPT | Performed by: FAMILY MEDICINE

## 2024-06-09 RX ORDER — PREDNISONE 20 MG/1
40 TABLET ORAL DAILY
Qty: 10 TABLET | Refills: 0 | Status: SHIPPED | OUTPATIENT
Start: 2024-06-09 | End: 2024-06-13

## 2024-06-09 ASSESSMENT — PAIN SCALES - GENERAL: PAINLEVEL: EXTREME PAIN (9)

## 2024-06-09 NOTE — PATIENT INSTRUCTIONS
For gout     Start prednisone 2 tablets daily for 5 days, do not take at night -will make it difficult to sleep     For pain    Acetaminophen (Tylenol ) 1000 mg 3 times a day for the next 3 to 5 days until pain resolves-maximum dose of acetaminophen (tylenol)  is 3000 mg in 24-hours         Keep foot elevated, ice to area, wear velcro shoe when standing/walking     Make an appointment with your PCP to be seen at the end of the week for follow up and long term plan to manage your gout       to ER if symptoms worsen

## 2024-06-09 NOTE — PROGRESS NOTES
ASSESSMENT/PLAN:      ICD-10-CM    1. Acute gout of right foot, unspecified cause  M10.9 XR Foot Right G/E 3 Views     CBC with platelets and differential     Uric acid     CRP, inflammation     CBC with platelets and differential     Uric acid     CRP, inflammation     Basic metabolic panel  (Ca, Cl, CO2, Creat, Gluc, K, Na, BUN)     Basic metabolic panel  (Ca, Cl, CO2, Creat, Gluc, K, Na, BUN)     predniSONE (DELTASONE) 20 MG tablet     Ankle/Foot Bracing Supplies Order Post-op Shoe; Right     CANCELED: Ankle/Foot Bracing Supplies Order Post-op Shoe; Right    proximal portion of rt  foot, across the base of the metatarsal elevated uric acid,and ESR nml CBC   no fx on x-ray,will make fu appt  PCP, ? start allopurinol      2. Type 2 diabetes mellitus with other specified complication, without long-term current use of insulin (H)  E11.69     no history of neuropathy, last A1C 5.3 1/4/2024      3. Chronic kidney disease, stage 3a (H)  N18.31     Creatinine stable at 1.6, GFR 46          Patient Instructions   For gout     Start prednisone 2 tablets daily for 5 days, do not take at night -will make it difficult to sleep     For pain    Acetaminophen (Tylenol ) 1000 mg 3 times a day for the next 3 to 5 days until pain resolves-maximum dose of acetaminophen (tylenol)  is 3000 mg in 24-hours         Keep foot elevated, ice to area, wear velcro shoe when standing/walking     Make an appointment with your PCP to be seen at the end of the week for follow up and long term plan to manage your gout       to ER if symptoms worsen                  Reviewed medication instructions and side effects. Follow up if experiences side effects.     I reviewed supportive care, otc meds to use if needed, expected course, and signs of concern.  Follow up as needed or if he does not improve within  1-2 days or if worsens in any way.  Reviewed red flag symptoms and is to go to the ER if experiences any of these.     The use of  [employer] dictation services may have been used to construct the content in this note; any grammatical or spelling errors are non-intentional. Please contact the author of this note directly if you are in need of any clarification.      On the day of the encounter, time spend on chart review, patient visit, review of testing, documentation was 30 minutes              Patient presents with:  Urgent Care  Foot Pain: Ongoing right foot pain x 4 days. No falls noted. Random pain. Now swollen. 9/10       Subjective     Brian Flynn is a 69 year old male who presents to clinic today for the following health issues:    HPI     Patient with onset of right foot pain and swelling 4 days ago  No history of fall or injury  Pain and swelling has progressively worsened over the last 4 days  Painful to weight-bear and ambulate  No fever or chills, no history of similar in the past  No history of chronic swelling in his feet no history of swelling in the right foot in the past    Patient initially denied history of gout, later patient recalled possibly having gout in his of one of his great toes many years ago     Patient with history of type 2 diabetes no history of neuropathy per patient, also has stage IIIa chronic kidney disease-        Past Medical History:   Diagnosis Date    Arthritis     BENIGN HYPERTENSION 07/13/2007    BLIND Right Eye     since birth    Cancer (H)     Diabetes (H)     Hernia, abdominal     Mumps     Obesity, unspecified      Social History     Tobacco Use    Smoking status: Never    Smokeless tobacco: Never   Substance Use Topics    Alcohol use: No       Current Outpatient Medications   Medication Sig Dispense Refill    amLODIPine (NORVASC) 5 MG tablet TAKE 1 TABLET(5 MG) BY MOUTH DAILY 90 tablet 0    ASPIRIN NOT PRESCRIBED (INTENTIONAL) Please choose reason not prescribed from choices below.      atorvastatin (LIPITOR) 20 MG tablet TAKE 1 TABLET(20 MG) BY MOUTH DAILY 90 tablet 0    blood  glucose (ACCU-CHEK SOLO PLUS) test strip Use to test blood sugar 1 times daily or as directed.  Ok to substitute alternative if insurance prefers. 100 strip prn    blood glucose (NO BRAND SPECIFIED) lancets standard Use to test blood sugar 3 times daily or as directed.(Any brand) 100 Lancet 0    blood glucose monitoring (ACCU-CHEK SOLO PLUS) meter device kit Use to test blood sugar 1 times daily or as directed.  Ok to substitute alternative if insurance prefers. 1 kit 0    brimonidine (ALPHAGAN) 0.2 % ophthalmic solution INSTILL 1 DROP IN RIGHT EYE TWICE DAILY      brimonidine-timolol (COMBIGAN) 0.2-0.5 % ophthalmic solution INSTILL 1 DROP IN RIGHT EYE TWICE DAILY      Lancets (ONETOUCH DELICA PLUS [id number]) MISC USE TO TEST BLOOD SUGAR 3 TIMES DAILY OR AS DIRECTED 300 each 0    lisinopril-hydrochlorothiazide (ZESTORETIC) 20-25 MG tablet Take 1 tablet by mouth daily 90 tablet 0    metoprolol succinate ER (TOPROL XL) 50 MG 24 hr tablet TAKE 1 AND 1/2 TABLETS(75 MG) BY MOUTH DAILY 135 tablet 1    ondansetron (ZOFRAN ODT) 4 MG ODT tab Take 1 tablet (4 mg) by mouth every 8 hours as needed for nausea 12 tablet 0    ONETOUCH ULTRA test strip USE TO TEST BLOOD SUGAR THREE TIMES DAILY OR AS DIRECTED 100 strip 0    predniSONE (DELTASONE) 20 MG tablet Take 2 tablets (40 mg) by mouth daily for 5 days 10 tablet 0    VITAMIN D PO Take 1 capsule by mouth daily      zinc gluconate 50 MG tablet Take 50 mg by mouth daily       Allergies   Allergen Reactions    Fluticasone Propionate      Sped system up for one week prior to one use       ROS are negative, except as otherwise noted HPI      Objective    BP (!) 171/79   Pulse 80   Temp 98.6  F (37  C)   Resp 18   Wt 112.4 kg (247 lb 12.8 oz)   SpO2 97%   BMI 36.59 kg/m    Body mass index is 36.59 kg/m .  Physical Exam   GENERAL: alert and mild distress  MS: Right foot-significant soft tissue swelling  compared to left,   tender to palpation across the proximal portion of foot  across the base of the metarsals , nontender to palpation over distal portions of the metatarsal on the dorsum of the foot, no pain with range of motion of the toes nontender over all 5 MCPs , DP +2   Right ankle-mild swelling of the soft tissue of the anterior ankle nontender to palpation over the anterior ankle , nontender over the medial and lateral malleolus, FROM, no pain in the ankle with range of motion   NEURO: Alert, normal strength and tone, mentation intact and speech normal, gait altered due to pain right foot       Diagnostic Test Results:  Labs reviewed in Epic  Results for orders placed or performed in visit on 06/09/24   XR Foot Right G/E 3 Views     Status: None    Narrative    EXAM: XR FOOT RIGHT G/E 3 VIEWS  LOCATION: [facility]  DATE: 6/9/2024    INDICATION: swelling and pain right foot,no hx of injury worsening over 4 days, tender across the proximal portion of foot across the base of the metarsals , no hx of gout  COMPARISON: None.      Impression    IMPRESSION: Degenerative change at the first MTP joint. Irregularity to the proximal phalanx of the small toe but this appears chronic, likely secondary to old healed injury. No evidence for acute appearing fracture. Achilles calcaneal spurring. Slight   soft tissue swelling over the dorsal aspect of the foot. No erosive changes are identified.   Results for orders placed or performed in visit on 06/09/24   Uric acid     Status: Abnormal   Result Value Ref Range    Uric Acid 8.2 (H) 3.4 - 7.0 mg/dL   CRP, inflammation     Status: Abnormal   Result Value Ref Range    CRP Inflammation 31.56 (H) <5.00 mg/L   CBC with platelets and differential     Status: None   Result Value Ref Range    WBC Count 10.8 4.0 - 11.0 10e3/uL    RBC Count 4.75 4.40 - 5.90 10e6/uL    Hemoglobin 13.7 13.3 - 17.7 g/dL    Hematocrit 41.9 40.0 - 53.0 %    MCV 88 78 - 100 fL    MCH 28.8 26.5 - 33.0 pg    MCHC 32.7 31.5 - 36.5 g/dL    RDW 12.4 10.0 - 15.0 %     Platelet Count 176 150 - 450 10e3/uL    % Neutrophils 77 %    % Lymphocytes 15 %    % Monocytes 8 %    % Eosinophils 1 %    % Basophils 0 %    % Immature Granulocytes 0 %    Absolute Neutrophils 8.3 1.6 - 8.3 10e3/uL    Absolute Lymphocytes 1.6 0.8 - 5.3 10e3/uL    Absolute Monocytes 0.8 0.0 - 1.3 10e3/uL    Absolute Eosinophils 0.1 0.0 - 0.7 10e3/uL    Absolute Basophils 0.0 0.0 - 0.2 10e3/uL    Absolute Immature Granulocytes 0.0 <=0.4 10e3/uL   Basic metabolic panel  (Ca, Cl, CO2, Creat, Gluc, K, Na, BUN)     Status: Abnormal   Result Value Ref Range    Sodium 140 135 - 145 mmol/L    Potassium 4.5 3.4 - 5.3 mmol/L    Chloride 105 94 - 109 mmol/L    Carbon Dioxide (CO2) 30 20 - 32 mmol/L    Anion Gap 5 3 - 14 mmol/L    Urea Nitrogen 31 (H) 7 - 30 mg/dL    Creatinine 1.60 (H) 0.66 - 1.25 mg/dL    GFR Estimate 46 (L) >60 mL/min/1.73m2    Calcium 9.5 8.5 - 10.1 mg/dL    Glucose 115 (H) 70 - 99 mg/dL   CBC with platelets and differential     Status: None    Narrative    The following orders were created for panel order CBC with platelets and differential.  Procedure                               Abnormality         Status                     ---------                               -----------         ------                     CBC with platelets and d...[[id number]]                      Final result                 Please view results for these tests on the individual orders.

## 2024-06-10 DIAGNOSIS — I10 ESSENTIAL HYPERTENSION, BENIGN: ICD-10-CM

## 2024-06-11 RX ORDER — AMLODIPINE BESYLATE 5 MG/1
TABLET ORAL
Qty: 90 TABLET | Refills: 0 | Status: SHIPPED | OUTPATIENT
Start: 2024-06-11 | End: 2024-07-09

## 2024-06-13 DIAGNOSIS — M10.9 ACUTE GOUT OF RIGHT FOOT, UNSPECIFIED CAUSE: ICD-10-CM

## 2024-06-13 NOTE — TELEPHONE ENCOUNTER
Medication Question or Refill        What medication are you calling about (include dose and sig)?: predniSONE (DELTASONE) 20 MG tablet     Preferred Pharmacy:   [id number] - [place], [place] - [address] AT [facility] [address]  [address]  [facility] [address]  Phone: [phone] Fax: [phone]      Controlled Substance Agreement on file:   CSA -- Patient Level:    CSA: None found at the patient level.       Who prescribed the medication?:     Do you need a refill? Yes    When did you use the medication last?     Patient offered an appointment? No    Do you have any questions or concerns?  Patient would like to have this on hand if it happens again, he went to the hospital on 6/12/2024 for gout.       Could we [facility] this information to you [facility] or would you prefer to receive a phone call?:   Patient would prefer a phone call   Okay to leave a detailed message?: Yes at Home number on file [phone] (home)

## 2024-06-14 RX ORDER — PREDNISONE 20 MG/1
40 TABLET ORAL DAILY
Qty: 10 TABLET | Refills: 0 | Status: SHIPPED | OUTPATIENT
Start: 2024-06-14 | End: 2024-07-09

## 2024-07-09 ENCOUNTER — OFFICE VISIT (OUTPATIENT)
Dept: FAMILY MEDICINE | Facility: CLINIC | Age: 69
End: 2024-07-09
Payer: COMMERCIAL

## 2024-07-09 VITALS
OXYGEN SATURATION: 98 % | WEIGHT: 245.5 LBS | BODY MASS INDEX: 36.36 KG/M2 | DIASTOLIC BLOOD PRESSURE: 80 MMHG | SYSTOLIC BLOOD PRESSURE: 140 MMHG | HEART RATE: 67 BPM | RESPIRATION RATE: 20 BRPM | TEMPERATURE: 97.1 F | HEIGHT: 69 IN

## 2024-07-09 DIAGNOSIS — N18.32 STAGE 3B CHRONIC KIDNEY DISEASE (H): ICD-10-CM

## 2024-07-09 DIAGNOSIS — I10 ESSENTIAL HYPERTENSION, BENIGN: ICD-10-CM

## 2024-07-09 DIAGNOSIS — E78.5 HYPERLIPIDEMIA LDL GOAL <100: ICD-10-CM

## 2024-07-09 DIAGNOSIS — E66.01 MORBID OBESITY (H): ICD-10-CM

## 2024-07-09 DIAGNOSIS — Z00.00 ENCOUNTER FOR ANNUAL WELLNESS EXAM IN MEDICARE PATIENT: ICD-10-CM

## 2024-07-09 DIAGNOSIS — E11.69 TYPE 2 DIABETES MELLITUS WITH OTHER SPECIFIED COMPLICATION, WITHOUT LONG-TERM CURRENT USE OF INSULIN (H): Primary | ICD-10-CM

## 2024-07-09 LAB
ALBUMIN SERPL BCG-MCNC: 4.1 G/DL (ref 3.5–5.2)
ALP SERPL-CCNC: 95 U/L (ref 40–150)
ALT SERPL W P-5'-P-CCNC: 16 U/L (ref 0–70)
AST SERPL W P-5'-P-CCNC: 24 U/L (ref 0–45)
BILIRUB DIRECT SERPL-MCNC: 0.22 MG/DL (ref 0–0.3)
BILIRUB SERPL-MCNC: 1.2 MG/DL
CHOLEST SERPL-MCNC: 133 MG/DL
CREAT UR-MCNC: 112 MG/DL
FASTING STATUS PATIENT QL REPORTED: YES
HBA1C MFR BLD: 5.7 % (ref 0–5.6)
HDLC SERPL-MCNC: 37 MG/DL
LDLC SERPL CALC-MCNC: 78 MG/DL
MICROALBUMIN UR-MCNC: <12 MG/L
MICROALBUMIN/CREAT UR: NORMAL MG/G{CREAT}
NONHDLC SERPL-MCNC: 96 MG/DL
PROT SERPL-MCNC: 7.3 G/DL (ref 6.4–8.3)
TRIGL SERPL-MCNC: 91 MG/DL

## 2024-07-09 PROCEDURE — 80076 HEPATIC FUNCTION PANEL: CPT | Performed by: FAMILY MEDICINE

## 2024-07-09 PROCEDURE — G0439 PPPS, SUBSEQ VISIT: HCPCS | Performed by: FAMILY MEDICINE

## 2024-07-09 PROCEDURE — 83036 HEMOGLOBIN GLYCOSYLATED A1C: CPT | Performed by: FAMILY MEDICINE

## 2024-07-09 PROCEDURE — 80061 LIPID PANEL: CPT | Performed by: FAMILY MEDICINE

## 2024-07-09 PROCEDURE — 82570 ASSAY OF URINE CREATININE: CPT | Performed by: FAMILY MEDICINE

## 2024-07-09 PROCEDURE — 82043 UR ALBUMIN QUANTITATIVE: CPT | Performed by: FAMILY MEDICINE

## 2024-07-09 PROCEDURE — 99214 OFFICE O/P EST MOD 30 MIN: CPT | Mod: 25 | Performed by: FAMILY MEDICINE

## 2024-07-09 PROCEDURE — 36415 COLL VENOUS BLD VENIPUNCTURE: CPT | Performed by: FAMILY MEDICINE

## 2024-07-09 RX ORDER — LISINOPRIL AND HYDROCHLOROTHIAZIDE 20; 25 MG/1; MG/1
1 TABLET ORAL DAILY
Qty: 90 TABLET | Refills: 1 | Status: SHIPPED | OUTPATIENT
Start: 2024-07-09

## 2024-07-09 RX ORDER — TIMOLOL MALEATE 5 MG/ML
SOLUTION/ DROPS OPHTHALMIC
COMMUNITY
Start: 2024-06-28

## 2024-07-09 RX ORDER — AMLODIPINE BESYLATE 5 MG/1
5 TABLET ORAL DAILY
Qty: 90 TABLET | Refills: 1 | Status: SHIPPED | OUTPATIENT
Start: 2024-07-09

## 2024-07-09 RX ORDER — METOPROLOL SUCCINATE 50 MG/1
75 TABLET, EXTENDED RELEASE ORAL DAILY
Qty: 135 TABLET | Refills: 1 | Status: CANCELLED | OUTPATIENT
Start: 2024-07-09

## 2024-07-09 RX ORDER — METOPROLOL SUCCINATE 50 MG/1
75 TABLET, EXTENDED RELEASE ORAL DAILY
Qty: 135 TABLET | Refills: 1 | Status: SHIPPED | OUTPATIENT
Start: 2024-07-09

## 2024-07-09 RX ORDER — RESPIRATORY SYNCYTIAL VIRUS VACCINE 120MCG/0.5
0.5 KIT INTRAMUSCULAR ONCE
Qty: 1 EACH | Refills: 0 | Status: CANCELLED | OUTPATIENT
Start: 2024-07-09 | End: 2024-07-09

## 2024-07-09 ASSESSMENT — PAIN SCALES - GENERAL: PAINLEVEL: NO PAIN (0)

## 2024-07-09 NOTE — LETTER
July 12, 2024      Jeremi Flynn  [id number]  [place] [facility] [address]        Dear ,    We are writing to inform you of your test results.    -Cholesterol levels (LDL,HDL, Triglycerides) are normal.  ADVISE: rechecking in 1 year.   -Microalbumin (urine protein) test is normal.  ADVISE: rechecking this annually.   -3-month blood sugar test is still stable.  Continue to work on your diet and exercise.   -Liver functions are normal.       Resulted Orders   HEMOGLOBIN A1C   Result Value Ref Range    Hemoglobin A1C 5.7 (H) 0.0 - 5.6 %      Comment:      Normal <5.7%   Prediabetes 5.7-6.4%    Diabetes 6.5% or higher     Note: Adopted from ADA consensus guidelines.   Lipid panel reflex to direct LDL Non-fasting   Result Value Ref Range    Cholesterol 133 <200 mg/dL    Triglycerides 91 <150 mg/dL    Direct Measure HDL 37 (L) >=40 mg/dL    LDL Cholesterol Calculated 78 <=100 mg/dL    Non HDL Cholesterol 96 <130 mg/dL    Patient Fasting > 8hrs? Yes     Narrative    Cholesterol  Desirable:  <200 mg/dL    Triglycerides  Normal:  Less than 150 mg/dL  Borderline High:  150-199 mg/dL  High:  200-499 mg/dL  Very High:  Greater than or equal to 500 mg/dL    Direct Measure HDL  Female:  Greater than or equal to 50 mg/dL   Male:  Greater than or equal to 40 mg/dL    LDL Cholesterol  Desirable:  <100mg/dL  Above Desirable:  100-129 mg/dL   Borderline High:  130-159 mg/dL   High:  160-189 mg/dL   Very High:  >= 190 mg/dL    Non HDL Cholesterol  Desirable:  130 mg/dL  Above Desirable:  130-159 mg/dL  Borderline High:  160-189 mg/dL  High:  190-219 mg/dL  Very High:  Greater than or equal to 220 mg/dL   Hepatic panel (Albumin, ALT, AST, Bili, Alk Phos, TP)   Result Value Ref Range    Protein Total 7.3 6.4 - 8.3 g/dL    Albumin 4.1 3.5 - 5.2 g/dL    Bilirubin Total 1.2 <=1.2 mg/dL    Alkaline Phosphatase 95 40 - 150 U/L    AST 24 0 - 45 U/L      Comment:      Reference intervals for this test were updated on 6/12/2023  to more accurately reflect our healthy population. There may be differences in the flagging of prior results with similar values performed with this method. Interpretation of those prior results can be made in the context of the updated reference intervals.    ALT 16 0 - 70 U/L      Comment:      Reference intervals for this test were updated on 6/12/2023 to more accurately reflect our healthy population. There may be differences in the flagging of prior results with similar values performed with this method. Interpretation of those prior results can be made in the context of the updated reference intervals.      Bilirubin Direct 0.22 0.00 - 0.30 mg/dL   Albumin Random Urine Quantitative with Creat Ratio   Result Value Ref Range    Creatinine Urine mg/dL 112.0 mg/dL      Comment:      The reference ranges have not been established in urine creatinine. The results should be integrated into the clinical context for interpretation.    Albumin Urine mg/L <12.0 mg/L      Comment:      The reference ranges have not been established in urine albumin. The results should be integrated into the clinical context for interpretation.    Albumin Urine mg/g Cr        Comment:      Unable to calculate, urine albumin and/or urine creatinine is outside detectable limits.  Microalbuminuria is defined as an albumin:creatinine ratio of 17 to 299 for males and 25 to 299 for females. A ratio of albumin:creatinine of 300 or higher is indicative of overt proteinuria.  Due to biologic variability, positive results should be confirmed by a second, first-morning random or 24-hour timed urine specimen. If there is discrepancy, a third specimen is recommended. When 2 out of 3 results are in the microalbuminuria range, this is evidence for incipient nephropathy and warrants increased efforts at glucose control, blood pressure control, and institution of therapy with an angiotensin-converting-enzyme (ACE) inhibitor (if the patient can tolerate  it).         If you have any questions or concerns, please call the clinic at the number listed above.       Sincerely,      Kirill Beal MD

## 2024-07-09 NOTE — PROGRESS NOTES
"  Assessment & Plan     Encounter for annual wellness exam in Medicare patient  Labs ordered once done we will follow-up on that.  His blood pressure is usually elevated in the office however his home readings are normal.  Continue with the current dose of medications.  Follow-up 6 months for recheck.    Essential hypertension, benign    - amLODIPine (NORVASC) 5 MG tablet; Take 1 tablet (5 mg) by mouth daily  - lisinopril-hydrochlorothiazide (ZESTORETIC) 20-25 MG tablet; Take 1 tablet by mouth daily  - metoprolol succinate ER (TOPROL XL) 50 MG 24 hr tablet; Take 1.5 tablets (75 mg) by mouth daily    Type 2 diabetes mellitus with other specified complication, without long-term current use of insulin (H)    - HEMOGLOBIN A1C; Future  - lisinopril-hydrochlorothiazide (ZESTORETIC) 20-25 MG tablet; Take 1 tablet by mouth daily  - HEMOGLOBIN A1C    Hyperlipidemia LDL goal <100  Continue to monitor his liver functions as well as cholesterol  - Lipid panel reflex to direct LDL Non-fasting; Future  - Hepatic panel (Albumin, ALT, AST, Bili, Alk Phos, TP); Future  - Lipid panel reflex to direct LDL Non-fasting  - Hepatic panel (Albumin, ALT, AST, Bili, Alk Phos, TP)    Stage 3b chronic kidney disease (H)    - Albumin Random Urine Quantitative with Creat Ratio; Future  - Albumin Random Urine Quantitative with Creat Ratio    Morbid obesity (H)            BMI  Estimated body mass index is 36.25 kg/m  as calculated from the following:    Height as of this encounter: 1.753 m (5' 9\").    Weight as of this encounter: 111.4 kg (245 lb 8 oz).     Blood sugar testing frequency justification:  Adjustment of medication(s)        Kasia Blood is a 69 year old, presenting for the following health issues:  Diabetes and Hypertension        7/9/2024     9:02 AM   Additional Questions   Roomed by Caro SQUIRES     History of Present Illness       Diabetes:   He presents for follow up of diabetes.  He is checking home blood glucose two times " daily.   He checks blood glucose before meals.  Blood glucose is never over 200 and never under 70.  When his blood glucose is low, the patient is asymptomatic for confusion, blurred vision, lethargy and reports not feeling dizzy, shaky, or weak.   He has no concerns regarding his diabetes at this time.   He is not experiencing numbness or burning in feet, excessive thirst, blurry vision, weight changes or redness, sores or blisters on feet.           Hypertension: He presents for follow up of hypertension.  He does check blood pressure  regularly outside of the clinic. Outpatient blood pressures have not been over 140/90. He follows a low salt diet.     He eats 2-3 servings of fruits and vegetables daily.He consumes 0 sweetened beverage(s) daily.He exercises with enough effort to increase his heart rate 60 or more minutes per day.  He exercises with enough effort to increase his heart rate 6 days per week.   He is taking medications regularly.         Annual Wellness Visit     Patient has been advised of split billing requirements and indicates understanding: Yes       Health Care Directive  Patient has a Health Care Directive on file no, advice to have one and send it to us    In general, how would you rate your overall physical health? good  Do you have a special diet?  Diabetic         No data to display              Do you see a dentist two times every year?  Yes  Have you been more tired than usual lately?  No  If you drink alcohol do you typically have >3 drinks per day or >7 drinks per week? No  Do you have a current opioid prescription? No  Do you use any other controlled substances or medications that are not prescribed by a provider? None  Social History     Tobacco Use    Smoking status: Never     Passive exposure: Never    Smokeless tobacco: Never   Vaping Use    Vaping status: Never Used   Substance Use Topics    Alcohol use: No    Drug use: No       Needs assistance for the following daily activities:  no assistance needed  Which of the following safety concerns are present in your home?  none identified   Do you (or your family members) have any concerns about your safety while driving?  No  Do you have any of the following hearing concerns?: No hearing concerns  In the past 6 months, have you been bothered by leaking of urine? No        10/4/2023   Social Factors   Worry food won't last until get money to buy more No   Food not last or not have enough money for food? No   Do you have housing? (Housing is defined as stable permanent housing and does not include staying ouside in a car, in a tent, in an abandoned building, in an overnight shelter, or couch-surfing.) Yes   Are you worried about losing your housing? No   Lack of transportation? No   Unable to get utilities (heat,electricity)? No            10/4/2023   Fall Risk   Fallen 2 or more times in the past year? No             Today's PHQ-2 Score:       7/8/2024     2:52 PM   PHQ-2 ( 1999 [employer])   Q1: Little interest or pleasure in doing things 0   Q2: Feeling down, depressed or hopeless 0   PHQ-2 Score 0   Q1: Little interest or pleasure in doing things Not at all   Q2: Feeling down, depressed or hopeless Not at all   PHQ-2 Score 0       ASCVD Risk   The ASCVD Risk score (Noreen DK, et al., 2019) failed to calculate for the following reasons:    The valid total cholesterol range is 130 to 320 mg/dL      Reviewed and updated as needed this visit by Provider                        Current providers sharing in care for this patient include:  Patient Care Team:  Kirill Beal MD as PCP - General (Family Practice)  Kirill Beal MD as Assigned PCP  Kirill Beal MD as Referring Physician (Family Medicine)  Alcon Whittaker MD as MD (Urology)  Alcon Whittaker MD as Assigned Cancer Care Provider  Sandeep Corley MD as MD (Nephrology)  Starr Paulino RN as Specialty Care Coordinator (Nephrology)  Sandeep Corley MD as Assigned  Nephrology Provider    The following health maintenance items are reviewed in Epic and correct as of today:  Health Maintenance   Topic Date Due    RSV VACCINE (Pregnancy & 60+) (1 - 1-dose 60+ series) Never done    COVID-19 Vaccine (4 - 2023-24 season) 09/01/2023    MEDICARE ANNUAL WELLNESS VISIT  04/04/2024    A1C  04/04/2024    LIPID  04/04/2024    MICROALBUMIN  08/13/2024    INFLUENZA VACCINE (1) 09/01/2024    ANNUAL REVIEW OF HM ORDERS  10/04/2024    EYE EXAM  04/24/2025    BMP  06/09/2025    HEMOGLOBIN  06/09/2025    DIABETIC FOOT EXAM  07/09/2025    ADVANCE CARE PLANNING  04/04/2028    COLORECTAL CANCER SCREENING  05/22/2028    DTAP/TDAP/TD IMMUNIZATION (2 - Td or Tdap) 12/11/2029    PARATHYROID  Completed    PHOSPHORUS  Completed    HEPATITIS C SCREENING  Completed    PHQ-2 (once per calendar year)  Completed    Pneumococcal Vaccine: 65+ Years  Completed    URINALYSIS  Completed    ALK PHOS  Completed    ZOSTER IMMUNIZATION  Completed    IPV IMMUNIZATION  Aged Out    HPV IMMUNIZATION  Aged Out    MENINGITIS IMMUNIZATION  Aged Out    RSV MONOCLONAL ANTIBODY  Aged Out    FALL RISK ASSESSMENT  Discontinued       Appropriate preventive services were discussed with this patient, including applicable screening as appropriate for fall prevention, nutrition, physical activity, Tobacco-use cessation, weight loss and cognition.  Checklist reviewing preventive services available has been given to the patient.           7/9/2024   Mini Cog   Clock Draw Score 2 Normal   3 Item Recall 3 objects recalled   Mini Cog Total Score 5                   Review of Systems  CONSTITUTIONAL: NEGATIVE for fever, chills, change in weight  INTEGUMENTARY/SKIN: NEGATIVE for worrisome rashes, moles or lesions  EYES: NEGATIVE for vision changes or irritation  ENT/MOUTH: NEGATIVE for ear, mouth and throat problems  RESP: NEGATIVE for significant cough or SOB  BREAST: NEGATIVE for masses, tenderness or discharge  CV: NEGATIVE for chest pain,  "palpitations or peripheral edema  GI: NEGATIVE for nausea, abdominal pain, heartburn, or change in bowel habits  : NEGATIVE for frequency, dysuria, or hematuria  MUSCULOSKELETAL: NEGATIVE for significant arthralgias or myalgia  NEURO: NEGATIVE for weakness, dizziness or paresthesias  ENDOCRINE: NEGATIVE for temperature intolerance, skin/hair changes  HEME: NEGATIVE for bleeding problems  PSYCHIATRIC: NEGATIVE for changes in mood or affect      Objective    BP (!) 152/74   Pulse 67   Temp 97.1  F (36.2  C) (Temporal)   Resp 20   Ht 1.753 m (5' 9\")   Wt 111.4 kg (245 lb 8 oz)   SpO2 98%   BMI 36.25 kg/m    Body mass index is 36.25 kg/m .  Physical Exam   GENERAL: alert and no distress  EYES: Eyes grossly normal to inspection, PERRL and conjunctivae and sclerae normal  HENT: ear canals and TM's normal, nose and mouth without ulcers or lesions  NECK: no adenopathy, no asymmetry, masses, or scars  RESP: lungs clear to auscultation - no rales, rhonchi or wheezes  CV: regular rate and rhythm, normal S1 S2, no S3 or S4, no murmur, click or rub, no peripheral edema  ABDOMEN: soft, nontender, no hepatosplenomegaly, no masses and bowel sounds normal  MS: no gross musculoskeletal defects noted, no edema  SKIN: no suspicious lesions or rashes  NEURO: Normal strength and tone, mentation intact and speech normal  PSYCH: mentation appears normal, affect normal/bright          Signed Electronically by: Kirill Beal MD    "

## 2024-07-10 DIAGNOSIS — E78.5 HYPERLIPIDEMIA LDL GOAL <130: ICD-10-CM

## 2024-07-10 RX ORDER — ATORVASTATIN CALCIUM 20 MG/1
20 TABLET, FILM COATED ORAL DAILY
Qty: 90 TABLET | Refills: 3 | Status: SHIPPED | OUTPATIENT
Start: 2024-07-10

## 2024-07-13 DIAGNOSIS — E11.69 TYPE 2 DIABETES MELLITUS WITH OTHER SPECIFIED COMPLICATION, WITHOUT LONG-TERM CURRENT USE OF INSULIN (H): ICD-10-CM

## 2024-07-15 RX ORDER — BLOOD SUGAR DIAGNOSTIC
STRIP MISCELLANEOUS
Qty: 100 STRIP | Refills: 2 | Status: SHIPPED | OUTPATIENT
Start: 2024-07-15

## 2024-10-20 ENCOUNTER — HEALTH MAINTENANCE LETTER (OUTPATIENT)
Age: 69
End: 2024-10-20

## 2025-01-29 ENCOUNTER — TELEPHONE (OUTPATIENT)
Dept: FAMILY MEDICINE | Facility: CLINIC | Age: 70
End: 2025-01-29
Payer: COMMERCIAL

## 2025-01-29 DIAGNOSIS — M10.9 ACUTE GOUT OF RIGHT FOOT, UNSPECIFIED CAUSE: Primary | ICD-10-CM

## 2025-01-29 RX ORDER — METHYLPREDNISOLONE 4 MG/1
TABLET ORAL
Qty: 21 TABLET | Refills: 0 | Status: SHIPPED | OUTPATIENT
Start: 2025-01-29

## 2025-01-29 NOTE — TELEPHONE ENCOUNTER
Pt reporting flare up of gout and is requesting medication. Writer requested to triage pt. Pt asking if ok to send request for medication to provider instead. Pt reporting eval/tx for gout in [facility] 6/24.     Routing to PCP; please advise

## 2025-01-29 NOTE — TELEPHONE ENCOUNTER
Triage Patient Outreach    Attempt # 1    Was call answered?  No.  Left voicemail to return call to Triage at Primary [facility]    Luh Damico RN

## 2025-03-06 ENCOUNTER — ANCILLARY PROCEDURE (OUTPATIENT)
Dept: CT IMAGING | Facility: CLINIC | Age: 70
End: 2025-03-06
Attending: UROLOGY
Payer: COMMERCIAL

## 2025-03-06 ENCOUNTER — LAB (OUTPATIENT)
Dept: LAB | Facility: CLINIC | Age: 70
End: 2025-03-06
Payer: COMMERCIAL

## 2025-03-06 DIAGNOSIS — C60.9 PENILE CANCER (H): ICD-10-CM

## 2025-03-06 DIAGNOSIS — C64.2 RENAL CELL CARCINOMA, LEFT (H): ICD-10-CM

## 2025-03-06 DIAGNOSIS — R97.20 ELEVATED PROSTATE SPECIFIC ANTIGEN (PSA): ICD-10-CM

## 2025-03-06 LAB
ANION GAP SERPL CALCULATED.3IONS-SCNC: 13 MMOL/L (ref 7–15)
BUN SERPL-MCNC: 36.8 MG/DL (ref 8–23)
CALCIUM SERPL-MCNC: 9.7 MG/DL (ref 8.8–10.4)
CHLORIDE SERPL-SCNC: 107 MMOL/L (ref 98–107)
CREAT BLD-MCNC: 1.7 MG/DL (ref 0.7–1.3)
CREAT SERPL-MCNC: 1.55 MG/DL (ref 0.67–1.17)
EGFRCR SERPLBLD CKD-EPI 2021: 43 ML/MIN/1.73M2
EGFRCR SERPLBLD CKD-EPI 2021: 48 ML/MIN/1.73M2
GLUCOSE SERPL-MCNC: 123 MG/DL (ref 70–99)
HCO3 SERPL-SCNC: 26 MMOL/L (ref 22–29)
POTASSIUM SERPL-SCNC: 4.6 MMOL/L (ref 3.4–5.3)
PSA SERPL DL<=0.01 NG/ML-MCNC: 4.96 NG/ML (ref 0–6.5)
SODIUM SERPL-SCNC: 146 MMOL/L (ref 135–145)

## 2025-03-06 PROCEDURE — 250N000009 HC RX 250: Performed by: UROLOGY

## 2025-03-06 PROCEDURE — 74177 CT ABD & PELVIS W/CONTRAST: CPT

## 2025-03-06 PROCEDURE — 82565 ASSAY OF CREATININE: CPT

## 2025-03-06 PROCEDURE — 250N000011 HC RX IP 250 OP 636: Performed by: UROLOGY

## 2025-03-06 RX ORDER — IOPAMIDOL 755 MG/ML
122 INJECTION, SOLUTION INTRAVASCULAR ONCE
Status: COMPLETED | OUTPATIENT
Start: 2025-03-06 | End: 2025-03-06

## 2025-03-06 RX ADMIN — IOPAMIDOL 122 ML: 755 INJECTION, SOLUTION INTRAVENOUS at 09:50

## 2025-03-06 RX ADMIN — SODIUM CHLORIDE 40 ML: 9 INJECTION, SOLUTION INTRAVENOUS at 09:50

## 2025-03-13 ENCOUNTER — OFFICE VISIT (OUTPATIENT)
Dept: UROLOGY | Facility: CLINIC | Age: 70
End: 2025-03-13
Payer: COMMERCIAL

## 2025-03-13 VITALS
BODY MASS INDEX: 37.51 KG/M2 | OXYGEN SATURATION: 97 % | HEART RATE: 71 BPM | DIASTOLIC BLOOD PRESSURE: 76 MMHG | WEIGHT: 262 LBS | SYSTOLIC BLOOD PRESSURE: 177 MMHG | HEIGHT: 70 IN

## 2025-03-13 DIAGNOSIS — C64.2 RENAL CELL CARCINOMA, LEFT (H): Primary | ICD-10-CM

## 2025-03-13 DIAGNOSIS — R97.20 ELEVATED PROSTATE SPECIFIC ANTIGEN (PSA): ICD-10-CM

## 2025-03-13 DIAGNOSIS — C60.9 PENILE CANCER (H): ICD-10-CM

## 2025-03-13 PROCEDURE — 1126F AMNT PAIN NOTED NONE PRSNT: CPT | Performed by: UROLOGY

## 2025-03-13 PROCEDURE — 99214 OFFICE O/P EST MOD 30 MIN: CPT | Performed by: UROLOGY

## 2025-03-13 PROCEDURE — 3078F DIAST BP <80 MM HG: CPT | Performed by: UROLOGY

## 2025-03-13 PROCEDURE — 3077F SYST BP >= 140 MM HG: CPT | Performed by: UROLOGY

## 2025-03-13 ASSESSMENT — PAIN SCALES - GENERAL: PAINLEVEL_OUTOF10: NO PAIN (0)

## 2025-03-13 NOTE — NURSING NOTE
Chief Complaint   Patient presents with    hx renal cell carcinoma     03- ct abd and pelvis ,03- psa and bmp too    Talk about the results today   Doing well .

## 2025-03-13 NOTE — PROGRESS NOTES
CHIEF COMPLAINT   It was my pleasure to see Brian Flynn who is a 69 year old male for follow-up of penile cancer, and renal cell carcinoma.      HPI  Brian Flynn is a very pleasant 69 year old male who presents with a history of penile cancer. He noted burning with urination and palpable mass. Now s/p partial penectomy 6/3/2022. Demonstrated well-differentiated, stage pT1a. No palpable nodes or visible nodes on CT. Margins negative from resection. He overall is doing well and reports improvement in his voiding.      He also has history of left renal cell carcinoma. Left robotic partial nephrectomy on 11/252022. 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 No recurrence noted on scan.     Of note, he also had prior right ureteral stone extracted via ureteroscopy in 2022.     He has also been noted previously to have elevated PSA.      Creat 1.55     PSA  3/6/2025 - 4.96  3/4/2024 - 4.66  12/15/2023 - 5.44  9/21/2023 - 6.4  3/15/2023 - 3.94  5/26/2022 - 3.0  2/25/2022 - 4.09     CT abd/pelvis w contrast 3/6/2025  IMPRESSION:   1.  No evidence of recurrent or metastatic disease in the abdomen or pelvis.  2.  Stable enlarged periportal and pelvic sidewall lymph nodes.  3.  Partial left nephrectomy.     Left Robotic Partial Nephrectomy  11/25/2022          SPECIMEN   Procedure   Partial nephrectomy    Specimen Laterality   Left    TUMOR   Tumor Focality   Unifocal    Tumor Site   Not specified    Tumor Size   Greatest Dimension (Centimeters): 4.3 cm   Histologic Type   Papillary renal cell carcinoma, type 1    Histologic Grade (WHO / ISUP)   G2 (nucleoli conspicuous and eosinophilic at 400x magnification, visible but not prominent at 100x magnification)    Tumor Extent   Limited to kidney    Sarcomatoid Features   Not identified    Rhabdoid Features   Not identified    Tumor Necrosis   Present    MARGINS   Margin Status   All margins negative for invasive carcinoma    REGIONAL  LYMPH NODES   Regional Lymph Node Status   Not applicable (no regional lymph nodes submitted or found)    PATHOLOGIC STAGE CLASSIFICATION (pTNM, AJCC 8th Edition)   Reporting of pT, pN, and (when applicable) pM categories is based on information available to the pathologist at the time the report is issued. As per the AJCC (Chapter 1, 8th Ed.) it is the managing physician s responsibility to establish the final pathologic stage based upon all pertinent information, including but potentially not limited to this pathology report.   Primary Tumor (pT)   pT1b    Regional Lymph Nodes (pN)   pN not assigned (no nodes submitted or found)    ADDITIONAL FINDINGS   Additional Findings in Nonneoplastic Kidney   None identified        Partial Penectomy 6/3/2022          TUMOR   Tumor Focality   Unifocal    Tumor Site   Glans    Tumor Macroscopic Features   Polypoid    Tumor Size   Greatest Dimension (Centimeters): 5.1 cm   Histologic Type   Squamous cell carcinoma, usual type    Histologic Grade   G1, well-differentiated    Tumor Thickness / Depth of Invasion   7.0 mm   Tumor Deep Borders   Pushing (broadly based)    Tumor Extent   Invades lamina propria        Invades dermis    Lymphovascular Invasion   Not identified    Perineural Invasion   Not identified    MARGINS   Margin Status for Invasive Carcinoma   All margins negative for invasive carcinoma    Closest Margin(s) to Invasive Carcinoma   Skin    Distance from Invasive Carcinoma to Closest Margin   9 mm   Margin Status for Noninvasive Carcinoma / Carcinoma in Situ   All margins negative for non-invasive carcinoma / carcinoma in situ               PATHOLOGIC STAGE CLASSIFICATION (pTNM, AJCC 8th Edition)   Reporting of pT, pN, and (when applicable) pM categories is based on information available to the pathologist at the time the report is issued. As per the AJCC (Chapter 1, 8th Ed.) it is the managing physician s responsibility to establish the final pathologic stage  "based upon all pertinent information, including but potentially not limited to this pathology report.   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  General Appearance Adult: Body mass index is 37.59 kg/m .  Alert, no acute distress, oriented  Lungs: no respiratory distress, or pursed lip breathing  Abdomen: soft, nontender, no organomegaly or masses  Back: no CVAT  Neuro: Alert, oriented, speech and mentation normal  Psych: affect and mood normal  : partial penectomy site and neomeatus unremarkable with no evidence of recurrence; no induration or erythema    Outside and Past Medical records:  Review of the result(s) of each unique test - CT, creat, PSA    ASSESSMENT and PLAN  69 year old male with penile cancer, ureteral stone, and left renal mass and elevated PSA.     Penile Cancer - s/p partial penectomy 6/3/2022 with stage pT1a squamous cell carcinoma. No palpable nodes. Margins of resection negative. CT with no evidence of disease recurrence.     Renal Mass - Partial nephrectomy completed with stage pT1a papillary RCC with negative margins. Partial nephrectomy completed 11/25/2022. No recurrence noted today.     Elevated PSA - PSA stable at 4.96. Given stability, will continue observation.  At this point, will plan PSA recheck in 12 months.     - Follow-up 12 months with CT, PSA, and BMP    15 minutes spent on the date of the encounter doing chart review, history and exam, documentation and further activities as noted above.    Alcon Whittaker MD  Urology  [facility]    "

## 2025-03-13 NOTE — Clinical Note
3/13/2025       RE: Brian Flynn  [id number]  [place] [address]     Dear Colleague,    Thank you for referring your patient, Brian Flynn, to the [facility] at [facility]. Please see a copy of my visit note below.      CHIEF COMPLAINT   It was my pleasure to see Brian Flynn who is a 69 year old male for follow-up of ***.      HPI  Brian Flynn is a very pleasant 69 year old male who presents with a history of penile cancer. He noted burning with urination and palpable mass. Now s/p partial penectomy 6/3/2022. Demonstrated well-differentiated, stage pT1a. No palpable nodes or visible nodes on CT. Margins negative from resection. He overall is doing well and reports improvement in his voiding.      He also has history of left renal mass. Now s/p left robotic partial nephrectomy on 11/252022. 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 No recurrence noted on scan.     Of note, he also had prior right ureteral stone extracted via ureteroscopy in 2022.      Creat 1.55     PSA  3/6/2025 - 4.96  3/4/2024 - 4.66  12/15/2023 - 5.44  9/21/2023 - 6.4  3/15/2023 - 3.94  5/26/2022 - 3.0  2/25/2022 - 4.09     CT abd/pelvis w contrast 3/6/2025  IMPRESSION:   1.  No evidence of recurrent or metastatic disease in the abdomen or pelvis.  2.  Stable enlarged periportal and pelvic sidewall lymph nodes.  3.  Partial left nephrectomy.     Left Robotic Partial Nephrectomy  11/25/2022          SPECIMEN   Procedure   Partial nephrectomy    Specimen Laterality   Left    TUMOR   Tumor Focality   Unifocal    Tumor Site   Not specified    Tumor Size   Greatest Dimension (Centimeters): 4.3 cm   Histologic Type   Papillary renal cell carcinoma, type 1    Histologic Grade (WHO / ISUP)   G2 (nucleoli conspicuous and eosinophilic at 400x magnification, visible but not prominent at 100x magnification)    Tumor  Extent   Limited to kidney    Sarcomatoid Features   Not identified    Rhabdoid Features   Not identified    Tumor Necrosis   Present    MARGINS   Margin Status   All margins negative for invasive carcinoma    REGIONAL LYMPH NODES   Regional Lymph Node Status   Not applicable (no regional lymph nodes submitted or found)    PATHOLOGIC STAGE CLASSIFICATION (pTNM, AJCC 8th Edition)   Reporting of pT, pN, and (when applicable) pM categories is based on information available to the pathologist at the time the report is issued. As per the AJCC (Chapter 1, 8th Ed.) it is the managing physician s responsibility to establish the final pathologic stage based upon all pertinent information, including but potentially not limited to this pathology report.   Primary Tumor (pT)   pT1b    Regional Lymph Nodes (pN)   pN not assigned (no nodes submitted or found)    ADDITIONAL FINDINGS   Additional Findings in Nonneoplastic Kidney   None identified        Partial Penectomy 6/3/2022          TUMOR   Tumor Focality   Unifocal    Tumor Site   Glans    Tumor Macroscopic Features   Polypoid    Tumor Size   Greatest Dimension (Centimeters): 5.1 cm   Histologic Type   Squamous cell carcinoma, usual type    Histologic Grade   G1, well-differentiated    Tumor Thickness / Depth of Invasion   7.0 mm   Tumor Deep Borders   Pushing (broadly based)    Tumor Extent   Invades lamina propria        Invades dermis    Lymphovascular Invasion   Not identified    Perineural Invasion   Not identified    MARGINS   Margin Status for Invasive Carcinoma   All margins negative for invasive carcinoma    Closest Margin(s) to Invasive Carcinoma   Skin    Distance from Invasive Carcinoma to Closest Margin   9 mm   Margin Status for Noninvasive Carcinoma / Carcinoma in Situ   All margins negative for non-invasive carcinoma / carcinoma in situ               PATHOLOGIC STAGE CLASSIFICATION (pTNM, AJCC 8th Edition)   Reporting of pT, pN, and (when applicable) pM  "categories is based on information available to the pathologist at the time the report is issued. As per the AJCC (Chapter 1, 8th Ed.) it is the managing physician s responsibility to establish the final pathologic stage based upon all pertinent information, including but potentially not limited to this pathology report.   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  General Appearance Adult: Body mass index is 37.59 kg/m .  Alert, no acute distress, oriented  Lungs: no respiratory distress, or pursed lip breathing  Abdomen: soft, nontender, no organomegaly or masses; ***  Back: *** CVAT  Neuro: Alert, oriented, speech and mentation normal  Psych: affect and mood normal  : {RAFA EXAM MALE GENITAL:496603}    Outside and Past Medical records:  Assessment requiring an independent historian(s) - {:[id number]}  Review of prior external note(s) from - {note reviewed source:010970}  Review of the result(s) of each unique test - ***    ASSESSMENT and PLAN  69 year old male with penile cancer, ureteral stone, and left renal mass.     Penile Cancer - s/p partial penectomy 6/3/2022 with stage pT1a squamous cell carcinoma. No palpable nodes. Margins of resection negative. CT with no evidence of disease recurrence.     Renal Mass - Partial nephrectomy completed with stage pT1a papillary RCC with negative margins. Partial completed 11/25/2022. No recurrence noted today.     Elevated PSA - PSA back down to 4.66. At this point, will plan PSA recheck in 12 months.     - Follow-up 12 months with CT, PSA, and BMP      *** minutes spent on the date of the encounter doing chart review, history and exam, documentation " and further activities as noted above.    Alcon Whittaker MD  Urology  [facility]        Again, thank you for allowing me to participate in the care of your patient.      Sincerely,    Alcon Whittaker MD

## 2025-03-17 ENCOUNTER — TELEPHONE (OUTPATIENT)
Dept: FAMILY MEDICINE | Facility: CLINIC | Age: 70
End: 2025-03-17
Payer: COMMERCIAL

## 2025-03-17 NOTE — TELEPHONE ENCOUNTER
Patient Quality Outreach    Patient is due for the following:   Hypertension -  BP check    Action(s) Taken:   Schedule a nurse only visit for BP check    Type of outreach:    Sent [employer] message. - Does have AWV scheduled but not until July 2025    Questions for provider review:    None           Ashanti Perez, [facility]

## 2025-04-13 ENCOUNTER — HEALTH MAINTENANCE LETTER (OUTPATIENT)
Age: 70
End: 2025-04-13

## 2025-04-29 ENCOUNTER — TRANSFERRED RECORDS (OUTPATIENT)
Dept: HEALTH INFORMATION MANAGEMENT | Facility: CLINIC | Age: 70
End: 2025-04-29
Payer: COMMERCIAL

## 2025-04-29 LAB — RETINOPATHY: NEGATIVE

## 2025-06-04 ENCOUNTER — OFFICE VISIT (OUTPATIENT)
Dept: URGENT CARE | Facility: URGENT CARE | Age: 70
End: 2025-06-04
Payer: COMMERCIAL

## 2025-06-04 VITALS
BODY MASS INDEX: 38.14 KG/M2 | RESPIRATION RATE: 18 BRPM | HEART RATE: 80 BPM | OXYGEN SATURATION: 96 % | DIASTOLIC BLOOD PRESSURE: 78 MMHG | SYSTOLIC BLOOD PRESSURE: 148 MMHG | HEIGHT: 70 IN | WEIGHT: 266.4 LBS | TEMPERATURE: 98.1 F

## 2025-06-04 DIAGNOSIS — M10.9 ACUTE GOUT OF RIGHT FOOT, UNSPECIFIED CAUSE: Primary | ICD-10-CM

## 2025-06-04 PROCEDURE — 99213 OFFICE O/P EST LOW 20 MIN: CPT | Performed by: PHYSICIAN ASSISTANT

## 2025-06-04 PROCEDURE — 3077F SYST BP >= 140 MM HG: CPT | Performed by: PHYSICIAN ASSISTANT

## 2025-06-04 PROCEDURE — 3078F DIAST BP <80 MM HG: CPT | Performed by: PHYSICIAN ASSISTANT

## 2025-06-04 RX ORDER — PREDNISONE 20 MG/1
40 TABLET ORAL DAILY
Qty: 10 TABLET | Refills: 0 | Status: SHIPPED | OUTPATIENT
Start: 2025-06-04 | End: 2025-06-09

## 2025-06-04 NOTE — PATIENT INSTRUCTIONS
Patient was educated on the natural course of condition. Gout is caused by having too much uric acid chemical in the blood. Uric acid can form sharp crystals that build up in joints which causes a gout attack. Take medications as prescribed. Side effects discussed. Conservative measures include warm compresses, low purine diet, and over-the-counter analgesics (Tylenol or Ibuprofen). See your primary care provider if symptoms worsen or do not improve in 7 days. Seek emergency care if you develop severe pain, or fever.

## 2025-06-04 NOTE — PROGRESS NOTES
Urgent Care Clinic Visit    Chief Complaint   Patient presents with    Urgent Care     Pt reports possible reoccurring gout in the right foot, has been treated before for the same issue.              6/4/2025    10:16 AM   Additional Questions   Roomed by christo paredes   Accompanied by n/a

## 2025-06-04 NOTE — PROGRESS NOTES
URGENT CARE VISIT:    SUBJECTIVE:   Chief Complaint   Patient presents with    Urgent Care     Pt reports possible reoccurring gout in the right foot, has been treated before for the same issue.      Brian Flynn is a 70 year old male who presents with a chief complaint of right great toe pain, swelling, and redness.  Symptoms began 1 day(s) ago, are moderate and sudden onset  No known injury. He has history of gout.   Pain exacerbated by walking and movement Relieved by rest.  He treated it initially with no therapy. This is not the first time this type of injury has occurred to this patient.     PMH:   Past Medical History:   Diagnosis Date    Arthritis     BENIGN HYPERTENSION 07/13/2007    BLIND Right Eye     since birth    Cancer (H)     Diabetes (H)     Hernia, abdominal     Mumps     Obesity, unspecified      Allergies: Fluticasone propionate   Medications:   Current Outpatient Medications   Medication Sig Dispense Refill    amLODIPine (NORVASC) 5 MG tablet Take 1 tablet (5 mg) by mouth daily. 90 tablet 1    atorvastatin (LIPITOR) 20 MG tablet Take 1 tablet (20 mg) by mouth daily 90 tablet 3    blood glucose (ACCU-CHEK SOLO PLUS) test strip Use to test blood sugar 1 times daily or as directed.  Ok to substitute alternative if insurance prefers. 100 strip prn    blood glucose (NO BRAND SPECIFIED) lancets standard Use to test blood sugar 3 times daily or as directed.(Any brand) 100 Lancet 0    blood glucose (ONETOUCH ULTRA) test strip USE TO TEST BLOOD SUGAR THREE TIMES DAILY OR AS DIRECTED 100 strip 2    blood glucose monitoring (ACCU-CHEK SOLO PLUS) meter device kit Use to test blood sugar 1 times daily or as directed.  Ok to substitute alternative if insurance prefers. 1 kit 0    brimonidine-timolol (COMBIGAN) 0.2-0.5 % ophthalmic solution INSTILL 1 DROP IN RIGHT EYE TWICE DAILY      Lancets (ONETOUCH DELICA PLUS [id number]) MISC USE TO TEST BLOOD SUGAR 3 TIMES DAILY OR AS DIRECTED 300 each 0     "lisinopril-hydrochlorothiazide (ZESTORETIC) 20-25 MG tablet Take 1 tablet by mouth daily. 90 tablet 1    magnesium oxide (MAG-OX) 400 MG tablet Take 400 mg by mouth daily.      metoprolol succinate ER (TOPROL XL) 50 MG 24 hr tablet Take 1.5 tablets (75 mg) by mouth daily. 135 tablet 1    predniSONE (DELTASONE) 20 MG tablet Take 2 tablets (40 mg) by mouth daily for 5 days. 10 tablet 0    timolol maleate (TIMOPTIC) 0.5 % ophthalmic solution INSTILL 1 DROP IN RIGHT EYE TWICE DAILY      VITAMIN D PO Take 1 capsule by mouth daily      zinc gluconate 50 MG tablet Take 50 mg by mouth daily      ASPIRIN NOT PRESCRIBED (INTENTIONAL) Please choose reason not prescribed from choices below. (Patient not taking: Reported on 6/4/2025)      methylPREDNISolone (MEDROL DOSEPAK) 4 MG tablet therapy pack Follow Package Directions (Patient not taking: Reported on 6/4/2025) 21 tablet 0     Social History:   Social History     Tobacco Use    Smoking status: Never     Passive exposure: Never    Smokeless tobacco: Never   Substance Use Topics    Alcohol use: No       ROS:  Review of systems negative except as stated above.    OBJECTIVE:  BP (!) 148/78   Pulse 80   Temp 98.1  F (36.7  C) (Oral)   Resp 18   Ht 1.778 m (5' 10\")   Wt 120.8 kg (266 lb 6.4 oz)   SpO2 96%   BMI 38.22 kg/m    GENERAL APPEARANCE: healthy, alert and no distress  MUSCULOSKELETAL: moderate generalized TTP over right dorsal foot  EXTREMITIES: peripheral pulses normal  SKIN: mild edema and erythema over right dorsal foot  NEURO: sensation intact       ASSESSMENT:    ICD-10-CM    1. Acute gout of right foot, unspecified cause  M10.9 predniSONE (DELTASONE) 20 MG tablet          PLAN:  Patient Instructions   Patient was educated on the natural course of condition. Gout is caused by having too much uric acid chemical in the blood. Uric acid can form sharp crystals that build up in joints which causes a gout attack. Take medications as prescribed. Side effects " discussed. Conservative measures include warm compresses, low purine diet, and over-the-counter analgesics (Tylenol or Ibuprofen). See your primary care provider if symptoms worsen or do not improve in 7 days. Seek emergency care if you develop severe pain, or fever.     Patient verbalized understanding and is agreeable to plan. The patient was discharged ambulatory and in stable condition.    Marisela Gutierrez PA-C on 6/4/2025 at 10:37 AM

## 2025-07-09 SDOH — HEALTH STABILITY: PHYSICAL HEALTH: ON AVERAGE, HOW MANY DAYS PER WEEK DO YOU ENGAGE IN MODERATE TO STRENUOUS EXERCISE (LIKE A BRISK WALK)?: 3 DAYS

## 2025-07-09 ASSESSMENT — SOCIAL DETERMINANTS OF HEALTH (SDOH): HOW OFTEN DO YOU GET TOGETHER WITH FRIENDS OR RELATIVES?: PATIENT DECLINED

## 2025-07-10 ENCOUNTER — OFFICE VISIT (OUTPATIENT)
Dept: FAMILY MEDICINE | Facility: CLINIC | Age: 70
End: 2025-07-10
Payer: COMMERCIAL

## 2025-07-10 VITALS
BODY MASS INDEX: 40.73 KG/M2 | RESPIRATION RATE: 18 BRPM | TEMPERATURE: 97.4 F | WEIGHT: 275 LBS | DIASTOLIC BLOOD PRESSURE: 74 MMHG | SYSTOLIC BLOOD PRESSURE: 134 MMHG | OXYGEN SATURATION: 97 % | HEART RATE: 70 BPM | HEIGHT: 69 IN

## 2025-07-10 DIAGNOSIS — E78.5 HYPERLIPIDEMIA LDL GOAL <100: ICD-10-CM

## 2025-07-10 DIAGNOSIS — E11.69 TYPE 2 DIABETES MELLITUS WITH OTHER SPECIFIED COMPLICATION, WITHOUT LONG-TERM CURRENT USE OF INSULIN (H): ICD-10-CM

## 2025-07-10 DIAGNOSIS — E66.01 MORBID OBESITY (H): ICD-10-CM

## 2025-07-10 DIAGNOSIS — M25.562 PAIN IN BOTH KNEES, UNSPECIFIED CHRONICITY: ICD-10-CM

## 2025-07-10 DIAGNOSIS — M25.561 PAIN IN BOTH KNEES, UNSPECIFIED CHRONICITY: ICD-10-CM

## 2025-07-10 DIAGNOSIS — M10.9 GOUT, UNSPECIFIED CAUSE, UNSPECIFIED CHRONICITY, UNSPECIFIED SITE: ICD-10-CM

## 2025-07-10 DIAGNOSIS — I10 ESSENTIAL HYPERTENSION, BENIGN: ICD-10-CM

## 2025-07-10 DIAGNOSIS — N18.32 STAGE 3B CHRONIC KIDNEY DISEASE (H): Primary | ICD-10-CM

## 2025-07-10 DIAGNOSIS — Z00.00 ENCOUNTER FOR MEDICARE ANNUAL WELLNESS EXAM: ICD-10-CM

## 2025-07-10 DIAGNOSIS — C64.2 RENAL CELL CARCINOMA, LEFT (H): ICD-10-CM

## 2025-07-10 LAB
ALBUMIN SERPL BCG-MCNC: 4 G/DL (ref 3.5–5.2)
ALP SERPL-CCNC: 100 U/L (ref 40–150)
ALT SERPL W P-5'-P-CCNC: 32 U/L (ref 0–70)
ANION GAP SERPL CALCULATED.3IONS-SCNC: 8 MMOL/L (ref 7–15)
AST SERPL W P-5'-P-CCNC: 26 U/L (ref 0–45)
BILIRUB SERPL-MCNC: 1.4 MG/DL
BUN SERPL-MCNC: 27.5 MG/DL (ref 8–23)
CALCIUM SERPL-MCNC: 9.5 MG/DL (ref 8.8–10.4)
CHLORIDE SERPL-SCNC: 103 MMOL/L (ref 98–107)
CHOLEST SERPL-MCNC: 151 MG/DL
CREAT SERPL-MCNC: 1.52 MG/DL (ref 0.67–1.17)
CREAT UR-MCNC: 95.8 MG/DL
EGFRCR SERPLBLD CKD-EPI 2021: 49 ML/MIN/1.73M2
EST. AVERAGE GLUCOSE BLD GHB EST-MCNC: 186 MG/DL
FASTING STATUS PATIENT QL REPORTED: YES
FASTING STATUS PATIENT QL REPORTED: YES
GLUCOSE SERPL-MCNC: 191 MG/DL (ref 70–99)
HBA1C MFR BLD: 8.1 % (ref 0–5.6)
HCO3 SERPL-SCNC: 29 MMOL/L (ref 22–29)
HDLC SERPL-MCNC: 34 MG/DL
HGB BLD-MCNC: 13.9 G/DL (ref 13.3–17.7)
LDLC SERPL CALC-MCNC: 88 MG/DL
MCV RBC AUTO: 86 FL (ref 78–100)
MICROALBUMIN UR-MCNC: 22.7 MG/L
MICROALBUMIN/CREAT UR: 23.7 MG/G CR (ref 0–17)
NONHDLC SERPL-MCNC: 117 MG/DL
POTASSIUM SERPL-SCNC: 5.1 MMOL/L (ref 3.4–5.3)
PROT SERPL-MCNC: 7.1 G/DL (ref 6.4–8.3)
SODIUM SERPL-SCNC: 140 MMOL/L (ref 135–145)
TRIGL SERPL-MCNC: 143 MG/DL
URATE SERPL-MCNC: 8.1 MG/DL (ref 3.4–7)

## 2025-07-10 RX ORDER — PREDNISONE 10 MG/1
10 TABLET ORAL DAILY
Qty: 5 TABLET | Refills: 1 | Status: SHIPPED | OUTPATIENT
Start: 2025-07-10

## 2025-07-10 ASSESSMENT — PAIN SCALES - GENERAL: PAINLEVEL_OUTOF10: MODERATE PAIN (6)

## 2025-07-10 NOTE — PATIENT INSTRUCTIONS
Patient Education   Preventive Care Advice   This is general advice given by our system to help you stay healthy. However, your care team may have specific advice just for you. Please talk to your care team about your preventive care needs.  Nutrition  Eat 5 or more servings of fruits and vegetables each day.  Try wheat bread, brown rice and whole grain pasta (instead of white bread, rice, and pasta).  Get enough calcium and vitamin D. Check the label on foods and aim for 100% of the RDA (recommended daily allowance).  Lifestyle  Exercise at least 150 minutes each week  (30 minutes a day, 5 days a week).  Do muscle strengthening activities 2 days a week. These help control your weight and prevent disease.  No smoking.  Wear sunscreen to prevent skin cancer.  Have a dental exam and cleaning every 6 months.  Yearly exams  See your health care team every year to talk about:  Any changes in your health.  Any medicines your care team has prescribed.  Preventive care, family planning, and ways to prevent chronic diseases.  Shots (vaccines)   HPV shots (up to age 26), if you've never had them before.  Hepatitis B shots (up to age 59), if you've never had them before.  COVID-19 shot: Get this shot when it's due.  Flu shot: Get a flu shot every year.  Tetanus shot: Get a tetanus shot every 10 years.  Pneumococcal, hepatitis A, and RSV shots: Ask your care team if you need these based on your risk.  Shingles shot (for age 50 and up)  General health tests  Diabetes screening:  Starting at age 35, Get screened for diabetes at least every 3 years.  If you are younger than age 35, ask your care team if you should be screened for diabetes.  Cholesterol test: At age 39, start having a cholesterol test every 5 years, or more often if advised.  Bone density scan (DEXA): At age 50, ask your care team if you should have this scan for osteoporosis (brittle bones).  Hepatitis C: Get tested at least once in your life.  STIs (sexually  transmitted infections)  Before age 24: Ask your care team if you should be screened for STIs.  After age 24: Get screened for STIs if you're at risk. You are at risk for STIs (including HIV) if:  You are sexually active with more than one person.  You don't use condoms every time.  You or a partner was diagnosed with a sexually transmitted infection.  If you are at risk for HIV, ask about PrEP medicine to prevent HIV.  Get tested for HIV at least once in your life, whether you are at risk for HIV or not.  Cancer screening tests  Cervical cancer screening: If you have a cervix, begin getting regular cervical cancer screening tests starting at age 21.  Breast cancer scan (mammogram): If you've ever had breasts, begin having regular mammograms starting at age 40. This is a scan to check for breast cancer.  Colon cancer screening: It is important to start screening for colon cancer at age 45.  Have a colonoscopy test every 10 years (or more often if you're at risk) Or, ask your provider about stool tests like a FIT test every year or Cologuard test every 3 years.  To learn more about your testing options, visit:   .  For help making a decision, visit:   https://bit.ly/sb58460.  Prostate cancer screening test: If you have a prostate, ask your care team if a prostate cancer screening test (PSA) at age 55 is right for you.  Lung cancer screening: If you are a current or former smoker ages 50 to 80, ask your care team if ongoing lung cancer screenings are right for you.  For informational purposes only. Not to replace the advice of your health care provider. Copyright   2023 [facility]. All rights reserved. Clinically reviewed by the [facility]. Scali 441696 - REV 01/24.  Learning About Activities of Daily Living  What are activities of daily living?     Activities of daily living (ADLs) are the basic self-care tasks you do every day. These include eating, bathing, dressing,  and moving around.  As you age, and if you have health problems, you may find that it's harder to do some of these tasks. If so, your doctor can suggest ideas that may help.  To measure what kind of help you may need, your doctor will ask how well you are able to do ADLs. Let your doctor know if there are any tasks that you are having trouble doing. This is an important first step to getting help. And when you have the help you need, you can stay as independent as possible.  How will a doctor assess your ADLs?  Asking about ADLs is part of a routine health checkup your doctor will likely do as you age. Your health check might be done in a doctor's office, in your home, or at a hospital. The goal is to find out if you are having any problems that could make it hard to care for yourself or that make it unsafe for you to be on your own.  To measure your ADLs, your doctor will ask how hard it is for you to do routine tasks. Your doctor may also want to know if you have changed the way you do a task because of a health problem. Your doctor may watch how you:  Walk back and forth.  Keep your balance while you stand or walk.  Move from sitting to standing or from a bed to a chair.  Button or unbutton a shirt or sweater.  Remove and put on your shoes.  It's common to feel a little worried or anxious if you find you can't do all the things you used to be able to do. Talking with your doctor about ADLs is a way to make sure you're as safe as possible and able to care for yourself as well as you can. You may want to bring a caregiver, friend, or family member to your checkup. They can help you talk to your doctor.  Follow-up care is a key part of your treatment and safety. Be sure to make and go to all appointments, and call your doctor if you are having problems. It's also a good idea to know your test results and keep a list of the medicines you take.  Current as of: October 24, 2024  Content Version: 14.5    5658-5567  [employer].   Care instructions adapted under license by your healthcare professional. If you have questions about a medical condition or this instruction, always ask your healthcare professional. [employer] disclaims any warranty or liability for your use of this information.    Preventing Falls: Care Instructions  Injuries and health problems such as trouble walking or poor eyesight can increase your risk of falling. So can some medicines. But there are things you can do to help prevent falls. You can exercise to get stronger. You can also arrange your home to make it safer.    Talk to your doctor about the medicines you take. Ask if any of them increase the risk of falls and whether they can be changed or stopped.   Try to exercise regularly. It can help improve your strength and balance. This can help lower your risk of falling.         Practice fall safety and prevention.   Wear low-heeled shoes that fit well and give your feet good support. Talk to your doctor if you have foot problems that make this hard.  Carry a cellphone or wear a medical alert device that you can use to call for help.  Use stepladders instead of chairs to reach high objects. Don't climb if you're at risk for falls. Ask for help, if needed.  Wear the correct eyeglasses, if you need them.        Make your home safer.   Remove rugs, cords, clutter, and furniture from walkways.  Keep your house well lit. Use night-lights in hallways and bathrooms.  Install and use sturdy handrails on stairways.  Wear nonskid footwear, even inside. Don't walk barefoot or in socks without shoes.        Be safe outside.   Use handrails, curb cuts, and ramps whenever possible.  Keep your hands free by using a shoulder bag or backpack.  Try to walk in well-lit areas. Watch out for uneven ground, changes in pavement, and debris.  Be careful in the winter. Walk on the grass or gravel when sidewalks are slippery. Use de-icer on steps and  "walkways. Add non-slip devices to shoes.    Put grab bars and nonskid mats in your shower or tub and near the toilet. Try to use a shower chair or bath bench when bathing.   Get into a tub or shower by putting in your weaker leg first. Get out with your strong side first. Have a phone or medical alert device in the bathroom with you.   Where can you learn more?  Go to https://www.[employer]/patiented  Enter G117 in the search box to learn more about \"Preventing Falls: Care Instructions.\"  Current as of: July 31, 2024  Content Version: 14.5    0307-7295 [employer].   Care instructions adapted under license by your healthcare professional. If you have questions about a medical condition or this instruction, always ask your healthcare professional. [employer] disclaims any warranty or liability for your use of this information.    Learning About Sleeping Well  What does sleeping well mean?     Sleeping well means getting enough sleep to feel good and stay healthy. How much sleep is enough varies among people.  The number of hours you sleep and how you feel when you wake up are both important. If you do not feel refreshed, you probably need more sleep. Another sign of not getting enough sleep is feeling tired during the day.  Experts recommend that adults get at least 7 or more hours of sleep per day. Children and older adults need more sleep.  Why is getting enough sleep important?  Getting enough quality sleep is a basic part of good health. When your sleep suffers, your physical health, mood, and your thoughts can suffer too. You may find yourself feeling more grumpy or stressed. Not getting enough sleep also can lead to serious problems, including injury, accidents, anxiety, and depression.  What might cause poor sleeping?  Many things can cause sleep problems, including:  Changes to your sleep schedule.  Stress. Stress can be caused by fear about a single event, such as giving a speech. " "Or you may have ongoing stress, such as worry about work or school.  Depression, anxiety, and other mental or emotional conditions.  Changes in your sleep habits or surroundings. This includes changes that happen where you sleep, such as noise, light, or sleeping in a different bed. It also includes changes in your sleep pattern, such as having jet lag or working a late shift.  Health problems, such as pain, breathing problems, and restless legs syndrome.  Lack of regular exercise.  Using alcohol, nicotine, or caffeine before bed.  How can you help yourself?  Here are some tips that may help you sleep more soundly and wake up feeling more refreshed.  Your sleeping area   Use your bedroom only for sleeping and sex. A bit of light reading may help you fall asleep. But if it doesn't, do your reading elsewhere in the house. Try not to use your TV, computer, smartphone, or tablet while you are in bed.  Be sure your bed is big enough to stretch out comfortably, especially if you have a sleep partner.  Keep your bedroom quiet, dark, and cool. Use curtains, blinds, or a sleep mask to block out light. To block out noise, use earplugs, soothing music, or a \"white noise\" machine.  Your evening and bedtime routine   Create a relaxing bedtime routine. You might want to take a warm shower or bath, or listen to soothing music.  Go to bed at the same time every night. And get up at the same time every morning, even if you feel tired.  What to avoid   Limit caffeine (coffee, tea, caffeinated sodas) during the day, and don't have any for at least 6 hours before bedtime.  Avoid drinking alcohol before bedtime. Alcohol can cause you to wake up more often during the night.  Try not to smoke or use tobacco, especially in the evening. Nicotine can keep you awake.  Limit naps during the day, especially close to bedtime.  Avoid lying in bed awake for too long. If you can't fall asleep or if you wake up in the middle of the night and can't " "get back to sleep within about 20 minutes, get out of bed and go to another room until you feel sleepy.  Avoid taking medicine right before bed that may keep you awake or make you feel hyper or energized. Your doctor can tell you if your medicine may do this and if you can take it earlier in the day.  If you can't sleep   Imagine yourself in a peaceful, pleasant scene. Focus on the details and feelings of being in a place that is relaxing.  Get up and do a quiet or boring activity until you feel sleepy.  Avoid drinking any liquids before going to bed to help prevent waking up often to use the bathroom.  Where can you learn more?  Go to https://www.[employer].net/patiented  Enter J942 in the search box to learn more about \"Learning About Sleeping Well.\"  Current as of: July 31, 2024  Content Version: 14.5    8569-6857 [employer].   Care instructions adapted under license by your healthcare professional. If you have questions about a medical condition or this instruction, always ask your healthcare professional. [employer] disclaims any warranty or liability for your use of this information.    Bladder Training: Care Instructions  Your Care Instructions     Bladder training is used to treat urge incontinence and stress incontinence. Urge incontinence means that the need to urinate comes on so fast that you can't get to a toilet in time. Stress incontinence means that you leak urine because of pressure on your bladder. For example, it may happen when you laugh, cough, or lift something heavy.  Bladder training can increase how long you can wait before you have to urinate. It can also help your bladder hold more urine. And it can give you better control over the urge to urinate.  It is important to remember that bladder training takes a few weeks to a few months to make a difference. You may not see results right away, but don't give up.  Follow-up care is a key part of your treatment and safety. " Be sure to make and go to all appointments, and call your doctor if you are having problems. It's also a good idea to know your test results and keep a list of the medicines you take.  How can you care for yourself at home?  Work with your doctor to come up with a bladder training program that is right for you. You may use one or more of the following methods.  Delayed urination  In the beginning, try to keep from urinating for 5 minutes after you first feel the need to go.  While you wait, take deep, slow breaths to relax. Kegel exercises can also help you delay the need to go to the bathroom.  After some practice, when you can easily wait 5 minutes to urinate, try to wait 10 minutes before you urinate.  Slowly increase the waiting period until you are able to control when you have to urinate.  Scheduled urination  Empty your bladder when you first wake up in the morning.  Schedule times throughout the day when you will urinate.  Start by going to the bathroom every hour, even if you don't need to go.  Slowly increase the time between trips to the bathroom.  When you have found a schedule that works well for you, keep doing it.  If you wake up during the night and have to urinate, do it. Apply your schedule to waking hours only.  Kegel exercises  These tighten and strengthen pelvic muscles, which can help you control the flow of urine. (If doing these exercises causes pain, stop doing them and talk with your doctor.) To do Kegel exercises:  Squeeze your muscles as if you were trying not to pass gas. Or squeeze your muscles as if you were stopping the flow of urine. Your belly, legs, and buttocks shouldn't move.  Hold the squeeze for 3 seconds, then relax for 5 to 10 seconds.  Start with 3 seconds, then add 1 second each week until you are able to squeeze for 10 seconds.  Repeat the exercise 10 times a session. Do 3 to 8 sessions a day.  When should you call for help?  Watch closely for changes in your health, and  "be sure to contact your doctor if:    Your incontinence is getting worse.     You do not get better as expected.   Where can you learn more?  Go to https://www.[employer].net/patiented  Enter V684 in the search box to learn more about \"Bladder Training: Care Instructions.\"  Current as of: April 30, 2024  Content Version: 14.5 2024-2025 [employer].   Care instructions adapted under license by your healthcare professional. If you have questions about a medical condition or this instruction, always ask your healthcare professional. [employer] disclaims any warranty or liability for your use of this information.       "

## 2025-07-10 NOTE — PROGRESS NOTES
Preventive Care Visit  [facility] AMY Beal MD, Family Medicine  Jul 10, 2025      Assessment & Plan   BP is stable, once labs reviewed we will refill the medication  Encounter for Medicare annual wellness exam    - Lipid panel reflex to direct LDL Non-fasting; Future  - Hemoglobin; Future  - Comprehensive metabolic panel (BMP + Alb, Alk Phos, ALT, AST, Total. Bili, TP); Future  - Lipid panel reflex to direct LDL Non-fasting  - Hemoglobin  - Comprehensive metabolic panel (BMP + Alb, Alk Phos, ALT, AST, Total. Bili, TP)    Stage 3b chronic kidney disease (H)    - Albumin Random Urine Quantitative with Creat Ratio; Future  - Hemoglobin; Future  - Comprehensive metabolic panel (BMP + Alb, Alk Phos, ALT, AST, Total. Bili, TP); Future  - Albumin Random Urine Quantitative with Creat Ratio  - Hemoglobin  - Comprehensive metabolic panel (BMP + Alb, Alk Phos, ALT, AST, Total. Bili, TP)    Type 2 diabetes mellitus with other specified complication, without long-term current use of insulin (H)    - HEMOGLOBIN A1C; Future  - Lipid panel reflex to direct LDL Non-fasting; Future  - Comprehensive metabolic panel (BMP + Alb, Alk Phos, ALT, AST, Total. Bili, TP); Future  - HEMOGLOBIN A1C  - Lipid panel reflex to direct LDL Non-fasting  - Comprehensive metabolic panel (BMP + Alb, Alk Phos, ALT, AST, Total. Bili, TP)    Renal cell carcinoma, left (H)  Urology      Morbid obesity (H)      Hyperlipidemia LDL goal <100    - Lipid panel reflex to direct LDL Non-fasting; Future  - Hemoglobin; Future  - Lipid panel reflex to direct LDL Non-fasting  - Hemoglobin    Pain in both knees, unspecified chronicity  Patient possible mild early arthritis.  We talked about some therapy along with possible knee injection will put a referral for sports medicine.  - XR Knee AP Standing Bilateral; Future  - Orthopedic  Referral; Future    Gout, unspecified cause, unspecified chronicity, unspecified site  Currently  "asymptomatic however he is requesting a small dose of prednisone hide in case he has a gout attack.  He is scheduled  - Uric acid; Future  - predniSONE (DELTASONE) 10 MG tablet; Take 1 tablet (10 mg) by mouth daily.  - Uric acid  Patient has been advised of split billing requirements and indicates understanding: Yes    BMI  Estimated body mass index is 40.61 kg/m  as calculated from the following:    Height as of this encounter: 1.753 m (5' 9\").    Weight as of this encounter: 124.7 kg (275 lb).       Counseling  Appropriate preventive services were addressed with this patient via screening, questionnaire, or discussion as appropriate for fall prevention, nutrition, physical activity, Tobacco-use cessation, social engagement, weight loss and cognition.  Checklist reviewing preventive services available has been given to the patient.  Reviewed patient's diet, addressing concerns and/or questions.   He is at risk for lack of exercise and has been provided with information to increase physical activity for the benefit of his well-being.   The patient was instructed to see the dentist every 6 months.   Discussed possible causes of fatigue. Patient reported safety concerns were addressed today.Information on urinary incontinence and treatment options given to patient.   Reviewed preventive health counseling, as reflected in patient instructions       Regular exercise       Healthy diet/nutrition    Follow-up    Follow-up Visit   Expected date:  Jul 17, 2026 (Approximate)      Follow Up Appointment Details:     Follow-up with whom?: PCP    Follow-Up for what?: Medicare Wellness    Welcome or Annual?: Annual Wellness    How?: In Person                 Kasia Blood is a 70 year old, presenting for the following:  Wellness Visit        7/10/2025     9:12 AM   Additional Questions   Roomed by Ham JOSÉ  Patient returns for physical at his chronic medical condition management.  He has a history of high blood " pressure along with diabetes.  Diagnosed with renal cell carcinoma following up with urology.  Ongoing complains of bilateral knee pain.  Worse with activity.  Ongoing symptoms of gout in the toe area.  He requested a prednisone prescription to be handy in case if he has similar episode currently asymptomatic.  Advance Care Planning    Discussed advance care planning with patient; informed AVS has link to [facility] [employer].        7/9/2025   General Health   How would you rate your overall physical health? (!) FAIR   Feel stress (tense, anxious, or unable to sleep) Not at all         7/9/2025   Nutrition   Diet: Diabetic         7/9/2025   Exercise   Days per week of moderate/strenous exercise 3 days         7/9/2025   Social Factors   Frequency of gathering with friends or relatives Patient declined   Worry food won't last until get money to buy more No   Food not last or not have enough money for food? No   Do you have housing? (Housing is defined as stable permanent housing and does not include staying outside in a car, in a tent, in an abandoned building, in an overnight shelter, or couch-surfing.) Yes   Are you worried about losing your housing? No   Lack of transportation? No   Unable to get utilities (heat,electricity)? No         7/10/2025   Fall Risk   Gait Speed Test (Document in seconds) 3.82   Gait Speed Test Interpretation Less than or equal to 5.00 seconds - PASS          7/9/2025   Activities of Daily Living- Home Safety   Needs help with the following daily activites None of the above   Safety concerns in the home No grab bars in the bathroom         7/9/2025   Dental   Dentist two times every year? (!) NO         7/9/2025   Hearing Screening   Hearing concerns? None of the above         7/9/2025   Driving Risk Screening   Patient/family members have concerns about driving No         7/9/2025   General Alertness/Fatigue Screening   Have you been more tired than usual lately? (!) YES         7/9/2025    Urinary Incontinence Screening   Bothered by leaking urine in past 6 months Yes         Today's PHQ-2 Score:       7/9/2025    12:41 PM   PHQ-2 ( 1999 [employer])   Q1: Little interest or pleasure in doing things 0   Q2: Feeling down, depressed or hopeless 0   PHQ-2 Score 0    Q1: Little interest or pleasure in doing things Not at all   Q2: Feeling down, depressed or hopeless Not at all   PHQ-2 Score 0       Patient-reported           7/9/2025   Substance Use   Alcohol more than 3/day or more than 7/wk Not Applicable   Do you have a current opioid prescription? No   How severe/bad is pain from 1 to 10? 7/10   Do you use any other substances recreationally? No     Social History     Tobacco Use    Smoking status: Never     Passive exposure: Never    Smokeless tobacco: Never   Vaping Use    Vaping status: Never Used   Substance Use Topics    Alcohol use: No    Drug use: No           7/9/2025   AAA Screening   Family history of Abdominal Aortic Aneurysm (AAA)? No   ASCVD Risk   The 10-year ASCVD risk score (Noreen PADRON, et al., 2019) is: 42.2%    Values used to calculate the score:      Age: 70 years      Sex: Male      Is Non- : No      Diabetic: Yes      Tobacco smoker: No      Systolic Blood Pressure: 142 mmHg      Is BP treated: Yes      HDL Cholesterol: 37 mg/dL      Total Cholesterol: 133 mg/dL        Reviewed and updated as needed this visit by Provider                    Past Medical History:   Diagnosis Date    Arthritis     BENIGN HYPERTENSION 07/13/2007    BLIND Right Eye     since birth    Cancer (H)     Diabetes (H)     Hernia, abdominal     Mumps     Obesity, unspecified      Past Surgical History:   Procedure Laterality Date    CIRCUMCISION,CLAMP  11/01/2007    for Severe phimosis with balanoposthitis    CYSTOSCOPY FLEXIBLE N/A 06/03/2022    Procedure: FLEXIBLE CYSTOURETHROSCOPY;  Surgeon: Alcon Whittaker MD;  Location: [facility] OR    DAVINCI NEPHRECTOMY PARTIAL Left  11/25/2022    Procedure: ROBOTIC ASSISTED LEFT PARTIAL NEPHRECTOMY;  Surgeon: Alcon Whittaker MD;  Location:  OR    LASER HOLMIUM LITHOTRIPSY URETER(S), INSERT STENT, COMBINED Right 10/21/2022    Procedure: CYSTOSCOPY RIGHT URETEROSCOPY, RIGHT RETROGRADES, RIGHT STONE EXTRACTION;  Surgeon: Alcon Whittaker MD;  Location: [facility] OR    PENECTOMY N/A 06/03/2022    Procedure: PARTIAL PENECTOMY;  Surgeon: Alcon Whittaker MD;  Location: [facility] OR    PENIS SURGERY       Current providers sharing in care for this patient include:  Patient Care Team:  Kirill Beal MD as PCP - General (Family Practice)  Kirill Beal MD as Assigned PCP  Kirill Beal MD as Referring Physician (Family Medicine)  Alcon Whittaker MD as MD (Urology)  Sandeep Corley MD as MD (Nephrology)  Starr Paulino, RN as Specialty Care Coordinator (Nephrology)  Sandeep Corley MD as Assigned Nephrology Provider  Alcon Whittaker MD as Assigned Surgical Provider    The following health maintenance items are reviewed in Epic and correct as of today:  Health Maintenance   Topic Date Due    RSV VACCINE (1 - Risk 60-74 years 1-dose series) Never done    COVID-19 VACCINE (4 - 2024-25 season) 09/01/2024    ANNUAL REVIEW OF HM ORDERS  10/04/2024    MICROALBUMIN  01/09/2025    A1C  04/10/2025    LIPID  07/09/2025    DIABETIC FOOT EXAM  07/09/2025    HEMOGLOBIN  06/09/2025    MEDICARE ANNUAL WELLNESS VISIT  07/09/2025    INFLUENZA VACCINE (1) 09/01/2025    BMP  03/06/2026    EYE EXAM  04/29/2026    ADVANCE CARE PLANNING  04/04/2028    COLORECTAL CANCER SCREENING  05/22/2028    DTAP/TDAP/TD VACCINE (2 - Td or Tdap) 12/11/2029    PARATHYROID  Completed    PHOSPHORUS  Completed    HEPATITIS C SCREENING  Completed    PHQ-2 (once per calendar year)  Completed    PNEUMOCOCCAL VACCINE 50+ YEARS  Completed    URINALYSIS  Completed    ALK PHOS  Completed    ZOSTER VACCINE  Completed    HPV VACCINE  Aged Out     "MENINGITIS VACCINE  Aged Out    FALL RISK ASSESSMENT  Discontinued         Review of Systems  CONSTITUTIONAL: NEGATIVE for fever, chills, change in weight  INTEGUMENTARY/SKIN: NEGATIVE for worrisome rashes, moles or lesions  EYES: NEGATIVE for vision changes or irritation  ENT/MOUTH: NEGATIVE for ear, mouth and throat problems  RESP: NEGATIVE for significant cough or SOB  BREAST: NEGATIVE for masses, tenderness or discharge  CV: NEGATIVE for chest pain, palpitations or peripheral edema  GI: NEGATIVE for nausea, abdominal pain, heartburn, or change in bowel habits  : NEGATIVE for frequency, dysuria, or hematuria  MUSCULOSKELETAL: NEGATIVE for significant arthralgias or myalgia, positive for knee pain.  NEURO: NEGATIVE for weakness, dizziness or paresthesias  ENDOCRINE: NEGATIVE for temperature intolerance, skin/hair changes  HEME: NEGATIVE for bleeding problems  PSYCHIATRIC: NEGATIVE for changes in mood or affect     Objective    Exam  BP (!) 142/78   Pulse 70   Temp 97.4  F (36.3  C) (Temporal)   Resp 18   Ht 1.753 m (5' 9\")   Wt 124.7 kg (275 lb)   SpO2 97%   BMI 40.61 kg/m     Estimated body mass index is 40.61 kg/m  as calculated from the following:    Height as of this encounter: 1.753 m (5' 9\").    Weight as of this encounter: 124.7 kg (275 lb).    Physical Exam  GENERAL: alert and no distress  EYES: Eyes grossly normal to inspection, PERRL and conjunctivae and sclerae normal  HENT: ear canals and TM's normal, nose and mouth without ulcers or lesions  NECK: no adenopathy, no asymmetry, masses, or scars  RESP: lungs clear to auscultation - no rales, rhonchi or wheezes  CV: regular rate and rhythm, normal S1 S2, no S3 or S4, no murmur, click or rub, no peripheral edema  ABDOMEN: soft, nontender, no hepatosplenomegaly, no masses and bowel sounds normal  MS: no gross musculoskeletal defects noted, no edema  SKIN: no suspicious lesions or rashes  NEURO: Normal strength and tone, mentation intact and speech " normal  PSYCH: mentation appears normal, affect normal/bright  Bilateral knee pain range of motion is somewhat limited no swelling.        7/10/2025   Mini Cog   Clock Draw Score 2 Normal   3 Item Recall 2 objects recalled   Mini Cog Total Score 4              Signed Electronically by: Kirill Beal MD

## 2025-07-14 ENCOUNTER — PATIENT OUTREACH (OUTPATIENT)
Dept: CARE COORDINATION | Facility: CLINIC | Age: 70
End: 2025-07-14
Payer: COMMERCIAL

## (undated) DEVICE — GOWN XXL 9575

## (undated) DEVICE — DAVINCI XI MONOPOLAR SCISSORS HOT SHEARS 8MM 470179

## (undated) DEVICE — CATH URETERAL FLEX TIP TIGERTAIL 06FRX70CM 139006

## (undated) DEVICE — LINEN TOWEL PACK X5 5464

## (undated) DEVICE — SOL WATER IRRIG 1000ML BOTTLE 2F7114

## (undated) DEVICE — WIRE GUIDE AMPLATZ SUPER STIFF 0.035"X145CM 46-524

## (undated) DEVICE — TAPE DURAPORE 3" SILK 1538-3

## (undated) DEVICE — ENDO POUCH UNIV RETRIEVAL SYSTEM INZII 10MM CD001

## (undated) DEVICE — GLOVE PROTEXIS W/NEU-THERA 8.0  2D73TE80

## (undated) DEVICE — PAD CHUX UNDERPAD 23X24" 7136

## (undated) DEVICE — SU MONOCRYL 4-0 PS-2 18" UND Y496G

## (undated) DEVICE — SU VICRYL 4-0 PS-2 18" UND J496H

## (undated) DEVICE — PREP SKIN SCRUB TRAY 4461A

## (undated) DEVICE — SOL NACL 0.9% IRRIG 1000ML BOTTLE 2F7124

## (undated) DEVICE — CLIP ENDO HEMO-LOC PURPLE LG 544240

## (undated) DEVICE — DAVINCI XI DRAPE COLUMN 470341

## (undated) DEVICE — BAG DRAIN URO FOR SIEMENS 8MM ADAPTER NS CC164NS-A

## (undated) DEVICE — GLOVE PROTEXIS W/NEU-THERA 7.5  2D73TE75

## (undated) DEVICE — SOL NACL 0.9% IRRIG 3000ML BAG 2B7477

## (undated) DEVICE — JELLY LUBRICATING SURGILUBE 4OZ TUBE

## (undated) DEVICE — DRAIN JACKSON PRATT RESERVOIR 100ML SU130-1305

## (undated) DEVICE — PACK MINOR SBA15MIFSE

## (undated) DEVICE — CATH TRAY FOLEY SURESTEP 16FR WDRAIN BAG STLK LATEX A300316A

## (undated) DEVICE — DRAIN PENROSE 0.25"X18" LATEX FREE GR201

## (undated) DEVICE — GLOVE PROTEXIS W/NEU-THERA 6.0  2D73TE60

## (undated) DEVICE — DAVINCI HOT SHEARS TIP COVER  400180

## (undated) DEVICE — NDL INSUFFLATION 13GA 120MM C2201

## (undated) DEVICE — SPONGE KITTNER 31001010

## (undated) DEVICE — DRAPE MINOR PROCEDURE LAP 29496

## (undated) DEVICE — TUBING CONMED AIRSEAL SMOKE EVAC INSUFFLATION ASM-EVAC

## (undated) DEVICE — DRAIN JACKSON PRATT 19FR ROUND SU130-1325

## (undated) DEVICE — GUIDEWIRE SENSOR DUAL FLEX STR 0.035"X150CM M0066703080

## (undated) DEVICE — DAVINCI XI GRASPER ENDOWRIST PROGRASP 470093

## (undated) DEVICE — Device

## (undated) DEVICE — SYSTEM LAPAROVUE VISIBILITY LAPVUE10

## (undated) DEVICE — SU SILK 3-0 FS-1 18" 684H

## (undated) DEVICE — ESU ELEC BLADE NN-STCK PLUMEPEN PRO 360D 10FT PLPRO4020

## (undated) DEVICE — DRAPE GYN/UROLOGY FLUID POUCH TUR 29455

## (undated) DEVICE — DAVINCI XI SEAL UNIVERSAL 5-8MM 470361

## (undated) DEVICE — TUBING SUCTION 6"X3/16" N56A

## (undated) DEVICE — TUBING IRRIG TUR Y TYPE 96" LF 6543-01

## (undated) DEVICE — BASKET NITINOL TIPLESS HALO  1.5FRX120CM 554120

## (undated) DEVICE — PACK DAVINCI UROLOGY SBA15UDFSG

## (undated) DEVICE — SUCTION IRR STRYKERFLOW II W/TIP 250-070-520

## (undated) DEVICE — SYR 10ML FINGER CONTROL W/O NDL 309695

## (undated) DEVICE — CATH FOLEY COUDE 16FR 5ML LATEX

## (undated) DEVICE — ESU GROUND PAD UNIVERSAL W/O CORD

## (undated) DEVICE — SOL WATER IRRIG 3000ML BAG 2B7117

## (undated) DEVICE — TROCAR AIRSEAL BLADELESS 12X120MM IAS12-120

## (undated) DEVICE — PACK CYSTOSCOPY SBA15CYFSI

## (undated) DEVICE — SU VICRYL 4-0 RB-1 27" J304

## (undated) DEVICE — DRSG KERLIX FLUFFS X5

## (undated) DEVICE — GLOVE PROTEXIS BLUE W/NEU-THERA 8.0  2D73EB80

## (undated) DEVICE — PROTECTOR ARM ONE-STEP TRENDELENBURG 40418

## (undated) DEVICE — SU VICRYL 0 UR-6 27" J603H

## (undated) DEVICE — SUCTION CANISTER MEDIVAC LINER 3000ML W/LID 65651-530

## (undated) DEVICE — RAD RX ISOVUE 300 (50ML) 61% IOPAMIDOL CHARGE PER ML

## (undated) DEVICE — BLADE KNIFE SURG 10 371110

## (undated) DEVICE — SOL NACL 0.9% INJ 1000ML BAG 2B1324X

## (undated) DEVICE — SU PDS II 2-0 CT-2 27"  Z333H

## (undated) DEVICE — DAVINCI XI DRAPE ARM 470015

## (undated) RX ORDER — DEXAMETHASONE SODIUM PHOSPHATE 4 MG/ML
INJECTION, SOLUTION INTRA-ARTICULAR; INTRALESIONAL; INTRAMUSCULAR; INTRAVENOUS; SOFT TISSUE
Status: DISPENSED
Start: 2022-11-25

## (undated) RX ORDER — FENTANYL CITRATE 50 UG/ML
INJECTION, SOLUTION INTRAMUSCULAR; INTRAVENOUS
Status: DISPENSED
Start: 2022-11-25

## (undated) RX ORDER — FENTANYL CITRATE 0.05 MG/ML
INJECTION, SOLUTION INTRAMUSCULAR; INTRAVENOUS
Status: DISPENSED
Start: 2022-11-25

## (undated) RX ORDER — HYDROMORPHONE HYDROCHLORIDE 1 MG/ML
INJECTION, SOLUTION INTRAMUSCULAR; INTRAVENOUS; SUBCUTANEOUS
Status: DISPENSED
Start: 2022-06-03

## (undated) RX ORDER — FENTANYL CITRATE 50 UG/ML
INJECTION, SOLUTION INTRAMUSCULAR; INTRAVENOUS
Status: DISPENSED
Start: 2022-06-03

## (undated) RX ORDER — CEFAZOLIN SODIUM/WATER 2 G/20 ML
SYRINGE (ML) INTRAVENOUS
Status: DISPENSED
Start: 2022-10-21

## (undated) RX ORDER — PROPOFOL 10 MG/ML
INJECTION, EMULSION INTRAVENOUS
Status: DISPENSED
Start: 2022-10-21

## (undated) RX ORDER — PROPOFOL 10 MG/ML
INJECTION, EMULSION INTRAVENOUS
Status: DISPENSED
Start: 2022-11-25

## (undated) RX ORDER — ONDANSETRON 2 MG/ML
INJECTION INTRAMUSCULAR; INTRAVENOUS
Status: DISPENSED
Start: 2022-10-21

## (undated) RX ORDER — CEFAZOLIN SODIUM 1 G/3ML
INJECTION, POWDER, FOR SOLUTION INTRAMUSCULAR; INTRAVENOUS
Status: DISPENSED
Start: 2022-06-03

## (undated) RX ORDER — HYDROMORPHONE HYDROCHLORIDE 1 MG/ML
INJECTION, SOLUTION INTRAMUSCULAR; INTRAVENOUS; SUBCUTANEOUS
Status: DISPENSED
Start: 2022-11-25

## (undated) RX ORDER — LIDOCAINE HYDROCHLORIDE 20 MG/ML
INJECTION, SOLUTION EPIDURAL; INFILTRATION; INTRACAUDAL; PERINEURAL
Status: DISPENSED
Start: 2022-10-21

## (undated) RX ORDER — BUPIVACAINE HYDROCHLORIDE 2.5 MG/ML
INJECTION, SOLUTION EPIDURAL; INFILTRATION; INTRACAUDAL
Status: DISPENSED
Start: 2022-11-25

## (undated) RX ORDER — REGADENOSON 0.08 MG/ML
INJECTION, SOLUTION INTRAVENOUS
Status: DISPENSED
Start: 2019-08-08

## (undated) RX ORDER — ONDANSETRON 2 MG/ML
INJECTION INTRAMUSCULAR; INTRAVENOUS
Status: DISPENSED
Start: 2022-11-25

## (undated) RX ORDER — FENTANYL CITRATE 50 UG/ML
INJECTION, SOLUTION INTRAMUSCULAR; INTRAVENOUS
Status: DISPENSED
Start: 2022-10-21